# Patient Record
Sex: FEMALE | Race: WHITE | NOT HISPANIC OR LATINO | Employment: OTHER | URBAN - METROPOLITAN AREA
[De-identification: names, ages, dates, MRNs, and addresses within clinical notes are randomized per-mention and may not be internally consistent; named-entity substitution may affect disease eponyms.]

---

## 2017-03-15 DIAGNOSIS — F34.1 DYSTHYMIC DISORDER: ICD-10-CM

## 2017-03-15 NOTE — TELEPHONE ENCOUNTER
Citalopram  Last Written Prescription Date:  12/6/16  Last Fill Quantity: 90,   # refills: 0  Last Office Visit : 3/23/16  Future Office visit:  3/23/17    Route due to warning

## 2017-03-16 RX ORDER — CITALOPRAM HYDROBROMIDE 40 MG/1
40 TABLET ORAL DAILY
Qty: 30 TABLET | Refills: 0 | Status: SHIPPED | OUTPATIENT
Start: 2017-03-16 | End: 2017-03-24

## 2017-03-16 RX ORDER — CITALOPRAM HYDROBROMIDE 40 MG/1
40 TABLET ORAL DAILY
Qty: 30 TABLET | Refills: 0 | Status: SHIPPED
Start: 2017-03-16 | End: 2017-03-16

## 2017-03-16 NOTE — TELEPHONE ENCOUNTER
She is not my patient.  Former patient of Dr. Mauricio.  Patient has not established care with a new provider.  I am not accepting new patients.  Okay for 30 tablets, no refills.  Must have an appointment with a new provider within one month for assessment.  Please call her with a list of providers accepting new patients.  Thank you. Dr. Gant

## 2017-03-24 ENCOUNTER — OFFICE VISIT (OUTPATIENT)
Dept: INTERNAL MEDICINE | Facility: CLINIC | Age: 72
End: 2017-03-24

## 2017-03-24 VITALS
WEIGHT: 193.4 LBS | TEMPERATURE: 98.1 F | SYSTOLIC BLOOD PRESSURE: 135 MMHG | HEART RATE: 68 BPM | OXYGEN SATURATION: 97 % | BODY MASS INDEX: 30.29 KG/M2 | RESPIRATION RATE: 16 BRPM | DIASTOLIC BLOOD PRESSURE: 76 MMHG

## 2017-03-24 DIAGNOSIS — G47.00 INSOMNIA, UNSPECIFIED TYPE: Primary | ICD-10-CM

## 2017-03-24 DIAGNOSIS — G47.33 OSA (OBSTRUCTIVE SLEEP APNEA): ICD-10-CM

## 2017-03-24 DIAGNOSIS — B35.1 ONYCHOMYCOSIS: ICD-10-CM

## 2017-03-24 DIAGNOSIS — Z12.31 ENCOUNTER FOR SCREENING MAMMOGRAM FOR BREAST CANCER: ICD-10-CM

## 2017-03-24 DIAGNOSIS — F34.1 DYSTHYMIC DISORDER: ICD-10-CM

## 2017-03-24 RX ORDER — CICLOPIROX 80 MG/ML
SOLUTION TOPICAL DAILY
Qty: 6.6 ML | Refills: 2 | Status: SHIPPED | OUTPATIENT
Start: 2017-03-24 | End: 2017-04-23

## 2017-03-24 RX ORDER — CITALOPRAM HYDROBROMIDE 40 MG/1
40 TABLET ORAL DAILY
Qty: 90 TABLET | Refills: 3 | Status: SHIPPED | OUTPATIENT
Start: 2017-03-24 | End: 2018-05-06

## 2017-03-24 ASSESSMENT — PAIN SCALES - GENERAL: PAINLEVEL: NO PAIN (0)

## 2017-03-24 NOTE — PATIENT INSTRUCTIONS
[facility] Medication Refill Request Information:  * Please contact your pharmacy regarding ANY request for medication refills.  ** [facility] Prescription Fax = [phone]  * Please allow 3 business days for routine medication refills.  * Please allow 5 business days for controlled substance medication refills.     [facility] Test Result notification information:  *You will be notified with in 7-10 days of your appointment day regarding the results of your test.  If you are on MyChart you will be notified as soon as the provider has reviewed the results and signed off on them.    [facility] [phone]

## 2017-03-24 NOTE — NURSING NOTE
Chief Complaint   Patient presents with     Refill Request     Here for medication refills     Sleep Problem     Concerned about sleeping problems     Toenail     Also here for void toenails on both feet     Juan Beard CMA at 9:00 AM on 3/24/2017

## 2017-03-24 NOTE — MR AVS SNAPSHOT
After Visit Summary   3/24/2017    Toshia Caruso    MRN: [id number]           Patient Information     Date Of Birth          1945        Visit Information        Provider Department      3/24/2017 9:00 AM Mely Reeves MD [facility]        Today's Diagnoses     Insomnia, unspecified type    -  1    Dysthymic disorder        Encounter for screening mammogram for breast cancer        Onychomycosis        HANNA (obstructive sleep apnea)          Care Instructions    [facility] Center Medication Refill Request Information:  * Please contact your pharmacy regarding ANY request for medication refills.  ** [facility] Prescription Fax = [phone]  * Please allow 3 business days for routine medication refills.  * Please allow 5 business days for controlled substance medication refills.     [facility] Test Result notification information:  *You will be notified with in 7-10 days of your appointment day regarding the results of your test.  If you are on MyChart you will be notified as soon as the provider has reviewed the results and signed off on them.    Primary Care Center [phone]           Follow-ups after your visit        Additional Services     SLEEP EVALUATION & MANAGEMENT REFERRAL - ADULT       Please be aware that coverage of these services is subject to the terms and limitations of your health insurance plan.  Call member services at your health plan with any benefit or coverage questions.      Please bring the following to your appointment:    >>   List of current medications   >>   This referral request   >>   Any documents/labs given to you for this referral    [facility]   [phone] (Age 15 and up)                  Follow-up notes from your care team     Return in about 1 year (around 3/24/2018) for Routine Visit.      Your next 10 appointments already scheduled     Mar 28, 2017  9:15 AM CDT   (Arrive by 9:00 AM)   MA SCREENING  DIGITAL BILATERAL with [id number]   [facility] ([facility])    [address]  [address]nd Floor  [facility] [address] [phone]           Do not use any powder, lotion or deodorant under your arms or on your breast. If you do, we will ask you to remove it before your exam.  Wear comfortable, two-piece clothing.  If you have any allergies, tell your care team.  Bring any previous mammograms from other facilities or have them mailed to the breast center. This mammogram location, [facility], now offers 3D mammography. It doesn't replace a screening mammogram and can be done with a regular screening mammogram. It is optional and not all insurances will pay for it. 3D mammography is a special kind of mammogram that produces a three-dimensional image of the breast by using low dose-xrays. 3D allows the radiologist to see the breast tissue differently from 2D, which reduces the chance of repeat testing due to overlapping breast tissue. If you are interested in have a 3D mammogram, please check with your insurance before you arrive for your exam. 3D mammography is offered to all patients. On the day of your exam you will be asked to sign a form stating yes, you wish to have 3D imaging or, no, you decline.            May 18, 2017  9:30 AM CDT   New Sleep Patient with Rios Aquino MD   [facility], [place], Sleep Study ([facility])    [address] [place] [id number] [phone]              Who to contact     Please call your clinic at [phone] to:    Ask questions about your health    Make or cancel appointments    Discuss your medicines    Learn about your test results    Speak to your doctor   If you have compliments or concerns about an experience at your clinic, or if you wish to file a complaint, please contact [facility] Patient Relations at [phone] or  email us at [email or web address]         Additional Information About Your Visit        [email or web address] Information     Admify gives you secure access to your electronic health record. If you see a primary care provider, you can also send messages to your care team and make appointments. If you have questions, please call your primary care clinic.  If you do not have a primary care provider, please call [phone] and they will assist you.      Admify is an electronic gateway that provides easy, online access to your medical records. With Admify, you can request a clinic appointment, read your test results, renew a prescription or communicate with your care team.     To access your existing account, please contact your [facility] or call [phone] for assistance.        Care EveryWhere ID     This is your Care EveryWhere ID. This could be used by other organizations to access your [place] medical records  [id number]        Your Vitals Were     Pulse Temperature Respirations Pulse Oximetry Breastfeeding? BMI (Body Mass Index)    68 98.1  F (36.7  C) (Oral) 16 97% No 30.29 kg/m2       Blood Pressure from Last 3 Encounters:   03/24/17 135/76   04/28/16 140/76   03/23/16 144/84    Weight from Last 3 Encounters:   03/24/17 87.7 kg (193 lb 6.4 oz)   04/28/16 90.7 kg (200 lb)   03/23/16 93.8 kg (206 lb 12.8 oz)                 Today's Medication Changes          These changes are accurate as of: 3/24/17 11:59 PM.  If you have any questions, ask your nurse or doctor.               Start taking these medicines.        Dose/Directions    ciclopirox 8 % Soln   Used for:  Onychomycosis   Started by:  Mely Reeves MD        Externally apply topically daily   Quantity:  6.6 mL   Refills:  2            Where to get your medicines      These medications were sent to [facility] [address] [place], [place] - [address]  [address] [facility] [address]      Phone:  [phone]     ciclopirox 8 % Soln    citalopram 40 MG tablet                Primary Care Provider    None Specified       No primary provider on file.        Thank you!     Thank you for choosing [facility]  for your care. Our goal is always to provide you with excellent care. Hearing back from our patients is one way we can continue to improve our services. Please take a few minutes to complete the written survey that you may receive in the mail after your visit with us. Thank you!             Your Updated Medication List - Protect others around you: Learn how to safely use, store and throw away your medicines at www.disposemymeds.org.          This list is accurate as of: 3/24/17 11:59 PM.  Always use your most recent med list.                   Brand Name Dispense Instructions for use    aspirin 81 MG tablet      Take 1 tablet by mouth daily.       CALCIUM 1200+D3 600- MG-MG-UNIT Tb24   Generic drug:  Calcium-Magnesium-Vitamin D      daily       ciclopirox 8 % Soln     6.6 mL    Externally apply topically daily       citalopram 40 MG tablet    celeXA    90 tablet    Take 1 tablet (40 mg) by mouth daily       desonide 0.05 % ointment    DESOWEN    15 g    Apply twice a day to the rash on the eyelid for 1-2 weeks when rash is active.       ESTROVEN Tabs      Take 1 tablet by mouth daily.       folic acid 400 MCG tablet    FOLVITE     daily       GLUCOSAMINE CHONDROITIN COMPLX Tabs      Take  by mouth. Occassionally       ketoconazole 2 % cream    NIZORAL     Apply topically daily       MULTIvitamin  S Caps      Take 1 tablet by mouth daily Vipul red       OMEGA 3 PO          SOLUBLE FIBER/PROBIOTICS PO      allign       SUPER B COMPLEX/VITAMIN C PO          tacrolimus 0.1 % ointment    PROTOPIC    30 g    Apply to rash areas of the eyelids twice a day until rash resolves.       VITAMIN D (CHOLECALCIFEROL) PO      Take 1 tablet by mouth daily.

## 2017-03-24 NOTE — PROGRESS NOTES
"SUBJECTIVE:    Ms. Caruso is a 71 year old female with pmh of dysthymic disorder, HTN, and HANNA. She presents for medication refill and due to difficulty sleeping. She also wants to discuss an issue she has with her large toenail. She has been having issues sleeping for the last 6 months as per her and her . He believes she is sleeping more during that day that previously. She states she is waking up every 2 hours every other night. She does not wake up for a reason and is not getting out of bed most of these times. She is not falling asleep while driving or at inconvenient times - she say her \"eyes feel heavy\" so she goes to take a nap a couple times a week. She does use CPAP but hasn't seen sleep medicine for a couple years. She denies headaches and does not believe she is waking up from snoring or gasping for air.     She is also concerned about some discoloration of her right big toe nail, she states it is yellowing and has a \"void\" underneath the nail. She noticed after she found some \"fuzz\" under the nail. She has not noticed any infections surrounding the nail, it has not bled, and it only bothers her at night with the pressure of blankets on it. She states it is now involving both big toes.     She is asking for refill of her citalopram.     Past Medical History:   Diagnosis Date     Dysthymic disorder 8/4/2011     Obstructive sleep apnea 9/18/2012     Unspecified essential hypertension 9/13/2011     Past Surgical History:   Procedure Laterality Date     COLONOSCOPY N/A 4/28/2016    Procedure: COLONOSCOPY;  Surgeon: Victoria Mathis MD;  Location:  GI     LAMINECTOMY CERIVCAL POSTERIOR THREE+ LEVELS  2002    C3-7     Family History   Problem Relation Age of Onset     DIABETES Mother      DIABETES Father      DIABETES Maternal Grandfather      C.A.D. Father      silent heart attacks     Thyroid Disease Mother 60     treated with radioactive iodine     Thyroid Disease Sister 30     Glaucoma " "Father      Colon Cancer Father 70     treated with surgery, 6\" colon removed     Social History   Substance Use Topics     Smoking status: Never Smoker     Smokeless tobacco: Never Used     Alcohol use Yes      Comment: rare       Review Of Systems  Skin: negative  Eyes: negative  Ears/Nose/Throat: negative  Respiratory: No shortness of breath, dyspnea on exertion, cough, or hemoptysis  Cardiovascular: negative  Gastrointestinal: negative  Genitourinary: negative  Musculoskeletal: negative  Neurologic: negative  Psychiatric: positive for sleep disturbance  Hematologic/Lymphatic/Immunologic: negative  Endocrine: negative    OBJECTIVE:  /76 (BP Location: Right arm, Patient Position: Chair, Cuff Size: Adult Regular)  Pulse 68  Temp 98.1  F (36.7  C) (Oral)  Resp 16  Wt 87.7 kg (193 lb 6.4 oz)  SpO2 97%  Breastfeeding? No  BMI 30.29 kg/m2  GENERAL APPEARANCE: Alert, no acute distress  HENT: EOMI, pupils equal and reactive, neck supple, TM clear, MMM  NECK: No adenopathy,masses or thyromegaly  RESP: lungs clear to auscultation   CV: normal rate, regular rhythm, no murmur or gallop  ABDOMEN: soft, no organomegaly, masses or tenderness  MS: denilson's and heberden nodes present on hands bilaterally, no joint effusions appreciated, no peripheral edema  SKIN: slight yellowing of right big toenail with lifting of nail off of nail bed, left toenail does not seem discolored but does have slight lifting off of nail bed.  NEURO: Alert, oriented, speech and mentation normal  PSYCHE: mentation appears normal, affect and mood normal    ASSESSMENT/PLAN:    1- Sleep disturbance: Hx of CPAP without recent check, but denies any worsening HANNA symptoms. 6 on the [place] sleepiness scale.    - Recommend follow-up/referral back to sleep medicine  -  Discussed sleep hygiene    2- Medication Refill: 3 on the PHQ-9. Pt maintaining well on current citalopram dosing. Could discuss lowering dose in the future.   - Refill citalopram " 40mg/day    3- Onycomycosis: mild nail changes with discoloration. Not severe enough for oral medication.   - recommend using Penlac topical for 1 month.    4- Health Maintenance: Pt due for mammogram    Total time spent 25 minutes.  More than 50% of the time spent with Ms. Caruso on counseling / coordinating her care          I, Ida Quesada am acting as scribe for Dr. Mely Reeves MD.    Provider Disclosure:    The above student acted as a scribe for this encounter.  I was present and performed the service.  I have reviewed the review of systems and past medical, family and social history.  I have reviewed the student's documentation of the exam and I have performed the exam.   I have reviewed and verified the medical student's documentation and it is correct except as follows: None  Mely Reeves M.D.  Internal Medicine   pager [phone]

## 2017-03-25 ASSESSMENT — PATIENT HEALTH QUESTIONNAIRE - PHQ9: SUM OF ALL RESPONSES TO PHQ QUESTIONS 1-9: 3

## 2017-03-28 ENCOUNTER — RADIANT APPOINTMENT (OUTPATIENT)
Dept: MAMMOGRAPHY | Facility: CLINIC | Age: 72
End: 2017-03-28

## 2017-03-28 DIAGNOSIS — Z12.31 ENCOUNTER FOR SCREENING MAMMOGRAM FOR BREAST CANCER: ICD-10-CM

## 2017-05-18 ENCOUNTER — OFFICE VISIT (OUTPATIENT)
Dept: SLEEP MEDICINE | Facility: CLINIC | Age: 72
End: 2017-05-18
Attending: INTERNAL MEDICINE
Payer: MEDICARE

## 2017-05-18 VITALS
HEIGHT: 67 IN | WEIGHT: 193 LBS | OXYGEN SATURATION: 95 % | DIASTOLIC BLOOD PRESSURE: 71 MMHG | BODY MASS INDEX: 30.29 KG/M2 | HEART RATE: 84 BPM | RESPIRATION RATE: 18 BRPM | SYSTOLIC BLOOD PRESSURE: 113 MMHG

## 2017-05-18 DIAGNOSIS — G47.33 OSA (OBSTRUCTIVE SLEEP APNEA): ICD-10-CM

## 2017-05-18 PROCEDURE — 99211 OFF/OP EST MAY X REQ PHY/QHP: CPT | Mod: ZF

## 2017-05-18 NOTE — PATIENT INSTRUCTIONS
SLEEP HYGIENE TIPS:    1. Don t go to bed unless you are sleepy.   If you are not sleepy at bedtime, then do something else. Read a book, listen to soft music or browse through a magazine. Find something relaxing, but not stimulating, to take your mind off of worries about sleep. This will relax your body and distract your mind.     2. If you are not asleep after 20 minutes, then get out of the bed.   Find something else to do that will make you feel relaxed. If you can, do this in another room. Your bedroom should be where you go to sleep. It is not a place to go when you are bored. Once you feel sleepy again, go back to bed.     3. Begin rituals that help you relax each night before bed.   This can include such things as a warm bath, light snack or a few minutes of reading.     4. Get up at the same time every morning.   Do this even on weekends and holidays.     5. Get a full night s sleep on a regular basis.   Get enough sleep so that you feel well-rested nearly every day.     6. Avoid taking naps if you can.   If you must take a nap, try to keep it short (less than one hour). Never take a nap after 3 p.m.     7. Keep a regular schedule.   Regular times for meals, medications, chores, and other activities help keep the inner body clock running smoothly.     8. Don t read, write, eat, watch TV, talk on the phone, or play cards in bed.     9. Do not have any caffeine after lunch.     10. Do not have a beer, a glass of wine, or any other alcohol within six hours of your bedtime.     11. Do not have a cigarette or any other source of nicotine before bedtime.     12. Do not go to bed hungry, but don t eat a big meal near bedtime either.     13. Avoid any tough exercise within six hours of your bedtime.   You should exercise on a regular basis, but do it earlier in the day. (Talk to your doctor before you begin an exercise program.)     14. Avoid sleeping pills, or use them cautiously.   Most doctors do not prescribe  sleeping pills for periods of more than three weeks. Do not drink alcohol while taking sleeping pills.     15. Try to get rid of or deal with things that make you worry.   If you are unable to do this, then find a time during the day to get all of your worries out of your system. Your bed is a place to rest, not a place to worry.     16. Make your bedroom quiet, dark, and a little bit cool.   An easy way to remember this: it should remind you of a cave. While this may not sound romantic, it seems to work for bats. Bats are champion sleepers. They get about 16 hours of sleep each day. Maybe it s because they sleep in dark, cool caves.     Insomnia - Relaxation  Stress, tension, and anxiety can be big factors contributing to insomnia.  If you can t relax your mind and body, then you won t be able to sleep.  You would likely benefit from trying some of the relaxation exercises that we ve assembled below.  These are all internet based links.  If you don t have or use a computer, you may benefit from one of the stress management books listed below that you can get at your public library or at a local bookstore for a relatively low price.      Progressive Muscle Relaxation (PMR):     http://www.AvidRetail/progressive-muscle-relaxation-exercise.html     http://studentsupport.[facility]/counseling/resources/self-help/relaxation-and-stress-management/   Deep Breathing Exercises:    http://www.AvidRetail/breathing-awareness.html     Meditation:     www.RPost    www.theArroyo Video Solutionsguided-meditation-site.com You may have to pay for some of these resources.    Guided Imagery:    http://www.AvidRetail/guided-imagery-scripts.html     http://Oyster/library/sqzfprlqmd-wpvgiy-gyfbudv/      Your BMI is Body mass index is 30.23 kg/(m^2).  Weight management is a personal decision.  If you are interested in exploring weight loss strategies, the following discussion covers the  approaches that may be successful. Body mass index (BMI) is one way to tell whether you are at a healthy weight, overweight, or obese. It measures your weight in relation to your height.  A BMI of 18.5 to 24.9 is in the healthy range. A person with a BMI of 25 to 29.9 is considered overweight, and someone with a BMI of 30 or greater is considered obese. More than two-thirds of American adults are considered overweight or obese.  Being overweight or obese increases the risk for further weight gain. Excess weight may lead to heart disease and diabetes.  Creating and following plans for healthy eating and physical activity may help you improve your health.  Weight control is part of healthy lifestyle and includes exercise, emotional health, and healthy eating habits. Careful eating habits lifelong are the mainstay of weight control. Though there are significant health benefits from weight loss, long-term weight loss with diet alone may be very difficult to achieve- studies show long-term success with dietary management in less than 10% of people. Attaining a healthy weight may be especially difficult to achieve in those with severe obesity. In some cases, medications, devices and surgical management might be considered.  What can you do?  If you are overweight or obese and are interested in methods for weight loss, you should discuss this with your provider.     Consider reducing daily calorie intake by 500 calories.     Keep a food journal.     Avoiding skipping meals, consider cutting portions instead.    Diet combined with exercise helps maintain muscle while optimizing fat loss. Strength training is particularly important for building and maintaining muscle mass. Exercise helps reduce stress, increase energy, and improves fitness. Increasing exercise without diet control, however, may not burn enough calories to loose weight.       Start walking three days a week 10-20 minutes at a time    Work towards walking  thirty minutes five days a week     Eventually, increase the speed of your walking for 1-2 minutes at time    In addition, we recommend that you review healthy lifestyles and methods for weight loss available through the National Institutes of Health patient information sites:  http://win.niddk.nih.gov/publications/index.htm    And look into health and wellness programs that may be available through your health insurance provider, employer, local community center, or danette club.    Weight management plan: Patient was referred to their PCP to discuss a diet and exercise plan.

## 2017-05-18 NOTE — MR AVS SNAPSHOT
After Visit Summary   5/18/2017    Toshia Caruso    MRN: [id number]           Patient Information     Date Of Birth          1945        Visit Information        Provider Department      5/18/2017 9:30 AM Rios Aquino MD [facility], [place], Sleep Study        Today's Diagnoses     HANNA (obstructive sleep apnea)          Care Instructions    SLEEP HYGIENE TIPS:    1. Don t go to bed unless you are sleepy.   If you are not sleepy at bedtime, then do something else. Read a book, listen to soft music or browse through a magazine. Find something relaxing, but not stimulating, to take your mind off of worries about sleep. This will relax your body and distract your mind.     2. If you are not asleep after 20 minutes, then get out of the bed.   Find something else to do that will make you feel relaxed. If you can, do this in another room. Your bedroom should be where you go to sleep. It is not a place to go when you are bored. Once you feel sleepy again, go back to bed.     3. Begin rituals that help you relax each night before bed.   This can include such things as a warm bath, light snack or a few minutes of reading.     4. Get up at the same time every morning.   Do this even on weekends and holidays.     5. Get a full night s sleep on a regular basis.   Get enough sleep so that you feel well-rested nearly every day.     6. Avoid taking naps if you can.   If you must take a nap, try to keep it short (less than one hour). Never take a nap after 3 p.m.     7. Keep a regular schedule.   Regular times for meals, medications, chores, and other activities help keep the inner body clock running smoothly.     8. Don t read, write, eat, watch TV, talk on the phone, or play cards in bed.     9. Do not have any caffeine after lunch.     10. Do not have a beer, a glass of wine, or any other alcohol within six hours of your bedtime.     11. Do not have a cigarette or any other source of nicotine before bedtime.      12. Do not go to bed hungry, but don t eat a big meal near bedtime either.     13. Avoid any tough exercise within six hours of your bedtime.   You should exercise on a regular basis, but do it earlier in the day. (Talk to your doctor before you begin an exercise program.)     14. Avoid sleeping pills, or use them cautiously.   Most doctors do not prescribe sleeping pills for periods of more than three weeks. Do not drink alcohol while taking sleeping pills.     15. Try to get rid of or deal with things that make you worry.   If you are unable to do this, then find a time during the day to get all of your worries out of your system. Your bed is a place to rest, not a place to worry.     16. Make your bedroom quiet, dark, and a little bit cool.   An easy way to remember this: it should remind you of a cave. While this may not sound romantic, it seems to work for bats. Bats are champion sleepers. They get about 16 hours of sleep each day. Maybe it s because they sleep in dark, cool caves.     Insomnia - Relaxation  Stress, tension, and anxiety can be big factors contributing to insomnia.  If you can t relax your mind and body, then you won t be able to sleep.  You would likely benefit from trying some of the relaxation exercises that we ve assembled below.  These are all internet based links.  If you don t have or use a computer, you may benefit from one of the stress management books listed below that you can get at your public library or at a local bookstore for a relatively low price.      Progressive Muscle Relaxation (PMR):     http://www.[employer]/progressive-muscle-relaxation-exercise.html     http://studentsupport.[facility]/counseling/resources/self-help/relaxation-and-stress-management/   Deep Breathing Exercises:    http://www.[employer]/breathing-awareness.html     Meditation:     www.[employer]    www.HighRoadsguided-meditation-site.[employer] You may have to pay for some of  these resources.    Guided Imagery:    http://www.[employer]/guided-imagery-scripts.html     http://Cur[employer]ider/library/tevmuwkrpb-xkhbxl-nfcdnfc/      Your BMI is Body mass index is 30.23 kg/(m^2).  Weight management is a personal decision.  If you are interested in exploring weight loss strategies, the following discussion covers the approaches that may be successful. Body mass index (BMI) is one way to tell whether you are at a healthy weight, overweight, or obese. It measures your weight in relation to your height.  A BMI of 18.5 to 24.9 is in the healthy range. A person with a BMI of 25 to 29.9 is considered overweight, and someone with a BMI of 30 or greater is considered obese. More than two-thirds of American adults are considered overweight or obese.  Being overweight or obese increases the risk for further weight gain. Excess weight may lead to heart disease and diabetes.  Creating and following plans for healthy eating and physical activity may help you improve your health.  Weight control is part of healthy lifestyle and includes exercise, emotional health, and healthy eating habits. Careful eating habits lifelong are the mainstay of weight control. Though there are significant health benefits from weight loss, long-term weight loss with diet alone may be very difficult to achieve- studies show long-term success with dietary management in less than 10% of people. Attaining a healthy weight may be especially difficult to achieve in those with severe obesity. In some cases, medications, devices and surgical management might be considered.  What can you do?  If you are overweight or obese and are interested in methods for weight loss, you should discuss this with your provider.     Consider reducing daily calorie intake by 500 calories.     Keep a food journal.     Avoiding skipping meals, consider cutting portions instead.    Diet combined with exercise helps maintain muscle while  optimizing fat loss. Strength training is particularly important for building and maintaining muscle mass. Exercise helps reduce stress, increase energy, and improves fitness. Increasing exercise without diet control, however, may not burn enough calories to loose weight.       Start walking three days a week 10-20 minutes at a time    Work towards walking thirty minutes five days a week     Eventually, increase the speed of your walking for 1-2 minutes at time    In addition, we recommend that you review healthy lifestyles and methods for weight loss available through the National Institutes of Health patient information sites:  http://win.niddk.nih.gov/publications/index.htm    And look into health and wellness programs that may be available through your health insurance provider, employer, local community center, or danette club.    Weight management plan: Patient was referred to their PCP to discuss a diet and exercise plan.            Follow-ups after your visit        Follow-up notes from your care team     Return in about 2 months (around 7/18/2017).      Who to contact     If you have questions or need follow up information about today's clinic visit or your schedule please contact [facility] directly at [phone].  Normal or non-critical lab and imaging results will be communicated to you by [employer], letter or phone within 4 business days after the clinic has received the results. If you do not hear from us within 7 days, please contact the clinic through [employer]UM[employer] or phone. If you have a critical or abnormal lab result, we will notify you by phone as soon as possible.  Submit refill requests through [employer] or call your pharmacy and they will forward the refill request to us. Please allow 3 business days for your refill to be completed.          Additional Information About Your Visit        [employer] Information     [employer] gives you secure access to your electronic health record. If you see  "a primary care provider, you can also send messages to your care team and make appointments. If you have questions, please call your primary care clinic.  If you do not have a primary care provider, please call [phone] and they will assist you.        Care EveryWhere ID     This is your Care EveryWhere ID. This could be used by other organizations to access your [facility] medical records  [id number]        Your Vitals Were     Pulse Respirations Height Pulse Oximetry BMI (Body Mass Index)       84 18 1.702 m (5' 7\") 95% 30.23 kg/m2        Blood Pressure from Last 3 Encounters:   05/18/17 113/71   03/24/17 135/76   04/28/16 140/76    Weight from Last 3 Encounters:   05/18/17 87.5 kg (193 lb)   03/24/17 87.7 kg (193 lb 6.4 oz)   04/28/16 90.7 kg (200 lb)              We Performed the Following     SLEEP EVALUATION & MANAGEMENT REFERRAL - ADULT        Primary Care Provider Office Phone # Fax #    Mely Reeves [phone]        [facility] [address] [facility] [address]  [facility] [address]        Thank you!     Thank you for choosing [facility] SLEEP [facility]  for your care. Our goal is always to provide you with excellent care. Hearing back from our patients is one way we can continue to improve our services. Please take a few minutes to complete the written survey that you may receive in the mail after your visit with us. Thank you!             Your Updated Medication List - Protect others around you: Learn how to safely use, store and throw away your medicines at www.disposemymeds.org.          This list is accurate as of: 5/18/17 10:01 AM.  Always use your most recent med list.                   Brand Name Dispense Instructions for use    aspirin 81 MG tablet      Take 1 tablet by mouth daily.       CALCIUM 1200+D3 600- MG-MG-UNIT Tb24   Generic drug:  Calcium-Magnesium-Vitamin D      daily       citalopram 40 MG tablet    celeXA    90 tablet    Take 1 tablet (40 mg) by mouth daily "       desonide 0.05 % ointment    DESOWEN    15 g    Apply twice a day to the rash on the eyelid for 1-2 weeks when rash is active.       ESTROVEN Tabs      Take 1 tablet by mouth daily.       folic acid 400 MCG tablet    FOLVITE     daily       GLUCOSAMINE CHONDROITIN COMPLX Tabs      Take  by mouth. Occassionally       MULTIvitamin  S Caps      Take 1 tablet by mouth daily Vipul red       OMEGA 3 PO          SOLUBLE FIBER/PROBIOTICS PO      allign       SUPER B COMPLEX/VITAMIN C PO          tacrolimus 0.1 % ointment    PROTOPIC    30 g    Apply to rash areas of the eyelids twice a day until rash resolves.       VITAMIN D (CHOLECALCIFEROL) PO      Take 1 tablet by mouth daily.

## 2017-05-19 NOTE — PROGRESS NOTES
SLEEP MEDICINE CLINIC NOTE   [facility] SLEEP DISORDER CENTER  Toshia Caruso 71 year old female  : 1945  MRN: [id number]      PRIMARY CARE PROVIDER: Mely Reeves    REFERRING PROVIDER: Mely Reeves MD   [facility]ICIANS  [address] [facility] [address]  [place] [address]    DATE OF SERVICE:  2017.      REASON FOR VISIT:  Sleep maintenance difficulties, nonrestorative sleep and excessive daytime sleepiness.      HISTORY OF PRESENT ILLNESS:  Toshia Caruso is a 71-year-old female with a history of essential hypertension, dysthymia, obstructive sleep apnea who is being evaluated for difficulty maintaining sleep, nonrestorative sleep, excessive daytime sleepiness.  The patient reports that she has been experiencing these symptoms for the last 1 year.  She is unable to identify a trigger, but does report some stress in the family over this period.  She also reports being diagnosed with obstructive sleep apnea following a PSG approximately 8 years ago.  She has been using a CPAP which is set at 7 cm of water.      She describes her current routine as going to bed between 9:30 p.m. and 11:00 p.m.  She usually likes to read in bed for a number of hours on her electronic device.  Prior to getting into bed, she is usually watching TV in her living room.  Once she turns the lights off, it takes her just a few minutes to fall asleep.  She does use CPAP at this time.  She reports waking up every 2-hours.  Most of these awakenings are spontaneous and these can last anywhere from 10 minutes to 1 to 1-1/2 hours.  She usually lies there are in bed and tries to fall asleep only occasionally leaving the bed to use the restroom.  She reports finally waking up between 6:30 and 7:30 a.m. spontaneously.  She feels nonrestored upon awakening, but denies any significant sleep inertia.  She drinks 4 cups of coffee in the morning with breakfast.  She follows the same schedule most days of the week, including the  weekends.  The patient is retired and spends a significant amount of time at home.  She reports taking a nap in either late morning or midafternoon which can last anywhere from 20 minutes to 3 hours.  The naps are restorative.  She does not use her CPAP during naps.  The naps are usually in the living room on a couch.      The patient denies any features of restless legs syndrome; however, she does report occasional hypnic jerks.  She denies any frequent dreams or nightmares.  She does report a history of sleep walking as a child which occurs rarely now.  Her [place] Sleepiness Score is 8/24 with no chances of falling asleep while driving.  She denies any dream enactment behavior or bruxism.      With the use of CPAP, the patient denies waking up with a headache in the morning or snoring.  She does not have snort arousals or witnessed apneas.  However, these symptoms occur when she is not using her CPAP.  She is currently using nasal pillows with the CPAP and reports breathing through her nose.  She does not have any GERD symptoms.  The patient prefers to sleep on her sides, but occasionally rolls over on her back.      She denies any features of hypocretin deficiency including sleep paralysis, cataplexy or hypnagogic or hypnopompic hallucinations.      SOCIAL HISTORY:  The patient is  and lives at home with her .  She denies smoking or alcohol use.  She used to work as a customer service or , has been retired for a number of years.      FAMILY HISTORY:  She reports that her daughter has obstructive sleep apnea and uses a CPAP.  No other significant medical conditions in the family.      PAST SURGICAL HISTORY:  Includes a cervical spine laminectomy over 10 years ago.      PAST MEDICAL HISTORY:   1.  Hypertension.   2.  Dysthymia.     3.  Intentional weight loss of about 35 pounds over the last 2 years.      REVIEW OF SYSTEMS:  A complete 14-point review of systems was  reviewed and no pertinent information to her clinic visit noted.      PHYSICAL EXAMINATION:   GENERAL:  Pleasant female sitting comfortably without any discomfort, speaking in full sentences.   HEENT:  Mallampati class II airway with a large tongue and prominent peripheral ridging, type 1 dental malocclusion noted.  Neck circumference 14 inches.  No obstruction to flow in the nares.  Next, BMI 30.29 kg/m2.  Rest of the exam is unremarkable.      ASSESSMENT AND PLAN:  The patient is a 71-year-old female with hypertension and dysthymia who is being evaluated for sleep maintenance difficulties, excessive daytime sleepiness and nonrestorative sleep.      1.  Sleep disordered breathing:  The patient has overall moderate obstructive sleep apnea which was diagnosed several years ago.  Her PSG interpretation was reviewed and this showed an apnea-hypopnea index of 17 events per hour with an RDI of 52 events per hour.  The patient is currently using CPAP likely set at 7 cm of water.  This machine is about 2 years old and she changes her supplies frequently.  We will get a download of her CPAP usage and adjust the CPAP settings as indicated.   2.  Sleep maintenance insomnia and excessive daytime sleepiness:  This is likely a combination of inadequate sleep hygiene given that the patient takes long naps spends a significant amount of time in bed when she is not sleeping as well as possible psychophysiological insomnia.  We discussed several measures with her to improve her sleep hygiene as well as perform techniques like stimulus control.  I also provided her with information on progressive muscle relaxation and guided imagery.  The patient was advised to follow these for the next several months.  We will see her back in 2 months and if her symptoms do not resolve, we will consider referral for cognitive behavioral therapy for insomnia.      Total time spent during the visit was 60 minutes;more than 50% time was spent in  counseling and coordinating plan of care.    Rios Aquino MD   of Medicine  Pulmonary, Critical Care and Sleep Medicine  [facility]  Pager: [phone]           D: 2017 11:10   T: 2017 23:44   MT:       Name:     FADIA RANGEL   MRN:      [id number]        Account:      [id number]   :      1945           Visit Date:   2017      Document: [id number]

## 2017-07-21 ENCOUNTER — OFFICE VISIT (OUTPATIENT)
Dept: SLEEP MEDICINE | Facility: CLINIC | Age: 72
End: 2017-07-21
Attending: INTERNAL MEDICINE
Payer: MEDICARE

## 2017-07-21 VITALS
SYSTOLIC BLOOD PRESSURE: 107 MMHG | RESPIRATION RATE: 16 BRPM | HEIGHT: 67 IN | BODY MASS INDEX: 29.66 KG/M2 | HEART RATE: 86 BPM | DIASTOLIC BLOOD PRESSURE: 76 MMHG | WEIGHT: 189 LBS

## 2017-07-21 DIAGNOSIS — G47.33 OSA (OBSTRUCTIVE SLEEP APNEA): Primary | ICD-10-CM

## 2017-07-21 DIAGNOSIS — F51.04 PSYCHOPHYSIOLOGICAL INSOMNIA: ICD-10-CM

## 2017-07-21 PROCEDURE — 99211 OFF/OP EST MAY X REQ PHY/QHP: CPT | Mod: ZF

## 2017-07-21 PROCEDURE — 40000809 ZZH STATISTIC NO DOCUMENTATION TO SUPPORT CHARGE

## 2017-07-21 NOTE — PROGRESS NOTES
SLEEP MEDICINE CLINIC NOTE   [facility] SLEEP DISORDER CENTER  Toshia Caruso 71 year old female  : 1945  MRN: [id number]      PRIMARY CARE PROVIDER: Mely Reeves    Reason for visit: Follow-up of insomnia and sleep-disordered breathing X    History of present illness: The patient is a very pleasant 71-year-old female with history of hypertension, dysthymia, obstructive sleep apnea who was initially seen in our clinic approximately 2 months ago for evaluation of difficulty maintaining sleep, nonrestorative sleep and excessive daytime sleepiness. It was thought that her symptoms were due to poor sleep hygiene and possible psychophysiological insomnia. She was advised she will follow stim was control, progressive muscle relaxation as well as improve her sleep hygiene. She reports that she has been practicing that over last 2 months. She has noted significant improvement in her daytime sleepiness and sleep maintenance insomnia over this time.  She describes her current schedule is going to bed between 10 and 11 PM. She likes to read in bed for a few minutes but it does not take long to fall sleep. She denies waking up in the middle of the night. She wakes up between 6:30 AM and 7 AM most days spontaneously. She feels her respiratory upon awakening and does not have excessive sleepiness. She takes 1-2 naps lasting from 15-30 minutes each day but besides that her [place] sleepiness score is 0. She keeps busy during the day doing crafts, gardening and reading.  She is continuing to use her CPAP during sleep. She has not noticed any difficulty related to that.    Assessment and plan: The patient is advised to continue following current sleep-wake schedule and be consistent with her sleep onset and offset time. She is also advised to continue using her CPAP every time she goes to sleep including during any naps. She is also advised to not drive if drowsy. She should change her CPAP supplies every 3-6  months.    She will be seen in clinic on an annual basis or sooner if new questions emerge.    The above note was dictated using voice recognition software and may include typographical errors. Please contact the author for any clarifications.      I spent a total of 25 minutes face to face with Toshia Caruso during today's office visit. Over 50% of this time was spent counseling the patient and/or coordinating care regarding their sleep disorder.     Rios Aquino MD   of Medicine  Pulmonary, Critical Care and Sleep Medicine  [facility]  Pager: [phone]

## 2017-07-21 NOTE — MR AVS SNAPSHOT
"              After Visit Summary   7/21/2017    Toshia Caruso    MRN: [id number]           Patient Information     Date Of Birth          1945        Visit Information        Provider Department      7/21/2017 2:30 PM Bhupinder Melo MD [facility], Sleep Study        Today's Diagnoses     HANNA (obstructive sleep apnea)    -  1    Psychophysiological insomnia           Follow-ups after your visit        Follow-up notes from your care team     Return in about 1 year (around 7/21/2018), or if symptoms worsen or fail to improve.      Who to contact     If you have questions or need follow up information about today's clinic visit or your schedule please contact [facility] directly at [phone].  Normal or non-critical lab and imaging results will be communicated to you by [employer], letter or phone within 4 business days after the clinic has received the results. If you do not hear from us within 7 days, please contact the clinic through [email or web address] or phone. If you have a critical or abnormal lab result, we will notify you by phone as soon as possible.  Submit refill requests through [employer] or call your pharmacy and they will forward the refill request to us. Please allow 3 business days for your refill to be completed.          Additional Information About Your Visit        MyChart Information     [employer] gives you secure access to your electronic health record. If you see a primary care provider, you can also send messages to your care team and make appointments. If you have questions, please call your primary care clinic.  If you do not have a primary care provider, please call [phone] and they will assist you.        Care EveryWhere ID     This is your Care EveryWhere ID. This could be used by other organizations to access your [facility] medical records  [id number]        Your Vitals Were     Pulse Respirations Height BMI (Body Mass Index)          86 16 1.702 m (5' 7\") 29.6 kg/m2   "       Blood Pressure from Last 3 Encounters:   07/21/17 107/76   05/18/17 113/71   03/24/17 135/76    Weight from Last 3 Encounters:   07/21/17 85.7 kg (189 lb)   05/18/17 87.5 kg (193 lb)   03/24/17 87.7 kg (193 lb 6.4 oz)              Today, you had the following     No orders found for display       Primary Care Provider Office Phone # Fax #    Mely Reeves [phone]        [facility] [address] [place] [address] [address] [place] [address]        Equal Access to Services     [facility] : Hadii marcellus choudhary Sosowmya, wayuridia sierra, qayoshi kaalmada gianluca, francisco sims . So [facility] [phone].    ATENCIÓN: Si habla español, tiene a gutierrez disposición servicios gratuitos de asistencia lingüística. [facility] [phone].    We comply with applicable federal civil rights laws and [place] laws. We do not discriminate on the basis of race, color, national origin, age, disability sex, sexual orientation or gender identity.            Thank you!     Thank you for choosing [facility], [facility]EP [facility]UDY  for your care. Our goal is always to provide you with excellent care. Hearing back from our patients is one way we can continue to improve our services. Please take a few minutes to complete the written survey that you may receive in the mail after your visit with us. Thank you!             Your Updated Medication List - Protect others around you: Learn how to safely use, store and throw away your medicines at www.disposemymeds.org.          This list is accurate as of: 7/21/17  3:36 PM.  Always use your most recent med list.                   Brand Name Dispense Instructions for use Diagnosis    aspirin 81 MG tablet      Take 1 tablet by mouth as needed        CALCIUM 1200+D3 600- MG-MG-UNIT Tb24   Generic drug:  Calcium-Magnesium-Vitamin D      daily        citalopram 40 MG tablet    celeXA    90 tablet    Take 1 tablet (40 mg) by mouth daily    Dysthymic disorder        ESTROVEN Tabs      Take 1 tablet by mouth daily.        folic acid 400 MCG tablet    FOLVITE     daily        GLUCOSAMINE CHONDROITIN COMPLX Tabs      Take  by mouth. Occassionally        MULTIvitamin  S Caps      Take 1 tablet by mouth daily Viupl red        OMEGA 3 PO       Routine general medical examination at a health care facility       SOLUBLE FIBER/PROBIOTICS PO      allign    Routine general medical examination at a health care facility       SUPER B COMPLEX/VITAMIN C PO           tacrolimus 0.1 % ointment    PROTOPIC    30 g    Apply to rash areas of the eyelids twice a day until rash resolves.    Contact dermatitis of eyelid, unspecified laterality       VITAMIN D (CHOLECALCIFEROL) PO      Take 1 tablet by mouth daily.

## 2018-03-09 ENCOUNTER — MYC MEDICAL ADVICE (OUTPATIENT)
Dept: INTERNAL MEDICINE | Facility: CLINIC | Age: 73
End: 2018-03-09

## 2018-05-06 DIAGNOSIS — F34.1 DYSTHYMIC DISORDER: ICD-10-CM

## 2018-05-08 ENCOUNTER — TELEPHONE (OUTPATIENT)
Dept: INTERNAL MEDICINE | Facility: CLINIC | Age: 73
End: 2018-05-08

## 2018-05-08 NOTE — TELEPHONE ENCOUNTER
----- Message from Kamila Younger RN sent at 5/8/2018  1:07 PM CDT -----  Pt   Toshia Caruso [[id number]]   [facility],    Please call to schedule.    Patient is overdue for annual clinic visit - unless otherwise indicated patient needs to be scheduled with 1st available.    Patient may need labs and or imaging - please check with RN care coordinator.    Thanks    I do not need any follow up on the scheduling of this appointment unless the patient will no longer be receiving care in our clinic.

## 2018-05-08 NOTE — TELEPHONE ENCOUNTER
citalopram (CELEXA) 40 MG tablet      Last Written Prescription Date:  3/24/17  Last Fill Quantity: 90,   # refills: 3  Last Office Visit : 3/24/17  Future Office visit:  None    Scheduling has been notified to contact the pt for appointment.      Routing  Because: PHQ9, appt

## 2018-05-08 NOTE — TELEPHONE ENCOUNTER
Called patient, spouse answered phone, patient was not currently home. Will try to call her again later.

## 2018-05-10 RX ORDER — CITALOPRAM HYDROBROMIDE 40 MG/1
40 TABLET ORAL DAILY
Qty: 30 TABLET | Refills: 0 | Status: SHIPPED | OUTPATIENT
Start: 2018-05-10 | End: 2018-06-02

## 2018-05-29 ASSESSMENT — ENCOUNTER SYMPTOMS
TROUBLE SWALLOWING: 0
MUSCLE WEAKNESS: 0
INSOMNIA: 1
ARTHRALGIAS: 1
DOUBLE VISION: 0
HEADACHES: 0
NIGHT SWEATS: 0
BOWEL INCONTINENCE: 0
SPEECH CHANGE: 0
NERVOUS/ANXIOUS: 0
BLOATING: 0
POOR WOUND HEALING: 0
SMELL DISTURBANCE: 0
LOSS OF CONSCIOUSNESS: 0
ABDOMINAL PAIN: 0
DIZZINESS: 0
WEAKNESS: 0
SEIZURES: 0
EXERCISE INTOLERANCE: 1
EYE WATERING: 1
HALLUCINATIONS: 0
SINUS CONGESTION: 1
SKIN CHANGES: 0
LIGHT-HEADEDNESS: 0
EYE PAIN: 0
HYPOTENSION: 0
POLYPHAGIA: 0
STIFFNESS: 1
FATIGUE: 1
MYALGIAS: 1
ORTHOPNEA: 0
HOARSE VOICE: 0
NECK PAIN: 0
HYPERTENSION: 0
SYNCOPE: 0
SORE THROAT: 0
FEVER: 0
NAUSEA: 0
DEPRESSION: 0
TASTE DISTURBANCE: 0
EYE IRRITATION: 0
BACK PAIN: 1
NECK MASS: 0
HEARTBURN: 0
CHILLS: 1
DECREASED CONCENTRATION: 0
JOINT SWELLING: 0
NUMBNESS: 0
INCREASED ENERGY: 1
DIARRHEA: 0
WEIGHT LOSS: 0
WEIGHT GAIN: 1
CONSTIPATION: 1
SINUS PAIN: 1
BLOOD IN STOOL: 0
SLEEP DISTURBANCES DUE TO BREATHING: 0
PANIC: 0
RECTAL PAIN: 0
LEG PAIN: 0
MUSCLE CRAMPS: 0
POLYDIPSIA: 0
NAIL CHANGES: 0
MEMORY LOSS: 0
DECREASED APPETITE: 0
TREMORS: 0
DISTURBANCES IN COORDINATION: 0
TINGLING: 1
EYE REDNESS: 0
PARALYSIS: 0
VOMITING: 0
ALTERED TEMPERATURE REGULATION: 1
JAUNDICE: 0

## 2018-05-29 ASSESSMENT — ACTIVITIES OF DAILY LIVING (ADL)
IN_THE_PAST_7_DAYS,_DID_YOU_NEED_HELP_FROM_OTHERS_TO_PERFORM_EVERYDAY_ACTIVITIES_SUCH_AS_EATING,_GETTING_DRESSED,_GROOMING,_BATHING,_WALKING,_OR_USING_THE_TOILET: N
IN_THE_PAST_7_DAYS,_DID_YOU_NEED_HELP_FROM_OTHERS_TO_TAKE_CARE_OF_THINGS_SUCH_AS_LAUNDRY_AND_HOUSEKEEPING,_BANKING,_SHOPPING,_USING_THE_TELEPHONE,_FOOD_PREPARATION,_TRANSPORTATION,_OR_TAKING_YOUR_OWN_MEDICATIONS?: N

## 2018-06-02 ENCOUNTER — RADIANT APPOINTMENT (OUTPATIENT)
Dept: GENERAL RADIOLOGY | Facility: CLINIC | Age: 73
End: 2018-06-02
Attending: INTERNAL MEDICINE
Payer: COMMERCIAL

## 2018-06-02 ENCOUNTER — HEALTH MAINTENANCE LETTER (OUTPATIENT)
Age: 73
End: 2018-06-02

## 2018-06-02 ENCOUNTER — OFFICE VISIT (OUTPATIENT)
Dept: INTERNAL MEDICINE | Facility: CLINIC | Age: 73
End: 2018-06-02
Payer: COMMERCIAL

## 2018-06-02 VITALS
SYSTOLIC BLOOD PRESSURE: 108 MMHG | HEART RATE: 71 BPM | HEIGHT: 67 IN | BODY MASS INDEX: 30.89 KG/M2 | DIASTOLIC BLOOD PRESSURE: 72 MMHG | WEIGHT: 196.8 LBS

## 2018-06-02 DIAGNOSIS — M25.551 HIP PAIN, RIGHT: ICD-10-CM

## 2018-06-02 DIAGNOSIS — Z23 NEED FOR VACCINATION: ICD-10-CM

## 2018-06-02 DIAGNOSIS — Z11.59 NEED FOR HEPATITIS C SCREENING TEST: ICD-10-CM

## 2018-06-02 DIAGNOSIS — I10 ESSENTIAL HYPERTENSION: ICD-10-CM

## 2018-06-02 DIAGNOSIS — G47.9 SLEEP DISTURBANCE: ICD-10-CM

## 2018-06-02 DIAGNOSIS — E78.00 HYPERCHOLESTEREMIA: Primary | ICD-10-CM

## 2018-06-02 DIAGNOSIS — G47.33 OSA (OBSTRUCTIVE SLEEP APNEA): ICD-10-CM

## 2018-06-02 DIAGNOSIS — Z12.31 VISIT FOR SCREENING MAMMOGRAM: ICD-10-CM

## 2018-06-02 DIAGNOSIS — E78.00 HYPERCHOLESTEREMIA: ICD-10-CM

## 2018-06-02 DIAGNOSIS — G47.10 EXCESSIVE SLEEPINESS: ICD-10-CM

## 2018-06-02 DIAGNOSIS — F34.1 DYSTHYMIC DISORDER: ICD-10-CM

## 2018-06-02 DIAGNOSIS — Z00.00 ROUTINE GENERAL MEDICAL EXAMINATION AT A HEALTH CARE FACILITY: Primary | ICD-10-CM

## 2018-06-02 DIAGNOSIS — Z00.00 ROUTINE GENERAL MEDICAL EXAMINATION AT A HEALTH CARE FACILITY: ICD-10-CM

## 2018-06-02 LAB
ALBUMIN SERPL-MCNC: 3.8 G/DL (ref 3.4–5)
ALP SERPL-CCNC: 72 U/L (ref 40–150)
ALT SERPL W P-5'-P-CCNC: 25 U/L (ref 0–50)
ANION GAP SERPL CALCULATED.3IONS-SCNC: 7 MMOL/L (ref 3–14)
AST SERPL W P-5'-P-CCNC: 20 U/L (ref 0–45)
BASOPHILS # BLD AUTO: 0 10E9/L (ref 0–0.2)
BASOPHILS NFR BLD AUTO: 0.9 %
BILIRUB SERPL-MCNC: 0.4 MG/DL (ref 0.2–1.3)
BUN SERPL-MCNC: 17 MG/DL (ref 7–30)
CALCIUM SERPL-MCNC: 8.8 MG/DL (ref 8.5–10.1)
CHLORIDE SERPL-SCNC: 106 MMOL/L (ref 94–109)
CHOLEST SERPL-MCNC: 225 MG/DL
CO2 SERPL-SCNC: 30 MMOL/L (ref 20–32)
CREAT SERPL-MCNC: 0.79 MG/DL (ref 0.52–1.04)
DIFFERENTIAL METHOD BLD: NORMAL
EOSINOPHIL # BLD AUTO: 0.3 10E9/L (ref 0–0.7)
EOSINOPHIL NFR BLD AUTO: 5.8 %
ERYTHROCYTE [DISTWIDTH] IN BLOOD BY AUTOMATED COUNT: 13 % (ref 10–15)
GFR SERPL CREATININE-BSD FRML MDRD: 72 ML/MIN/1.7M2
GLUCOSE SERPL-MCNC: 93 MG/DL (ref 70–99)
HCT VFR BLD AUTO: 43.6 % (ref 35–47)
HDLC SERPL-MCNC: 88 MG/DL
HGB BLD-MCNC: 14.5 G/DL (ref 11.7–15.7)
IMM GRANULOCYTES # BLD: 0 10E9/L (ref 0–0.4)
IMM GRANULOCYTES NFR BLD: 0.4 %
LDLC SERPL CALC-MCNC: 126 MG/DL
LYMPHOCYTES # BLD AUTO: 1.2 10E9/L (ref 0.8–5.3)
LYMPHOCYTES NFR BLD AUTO: 26.8 %
MCH RBC QN AUTO: 32.1 PG (ref 26.5–33)
MCHC RBC AUTO-ENTMCNC: 33.3 G/DL (ref 31.5–36.5)
MCV RBC AUTO: 97 FL (ref 78–100)
MONOCYTES # BLD AUTO: 0.3 10E9/L (ref 0–1.3)
MONOCYTES NFR BLD AUTO: 7.3 %
NEUTROPHILS # BLD AUTO: 2.7 10E9/L (ref 1.6–8.3)
NEUTROPHILS NFR BLD AUTO: 58.8 %
NONHDLC SERPL-MCNC: 138 MG/DL
NRBC # BLD AUTO: 0 10*3/UL
NRBC BLD AUTO-RTO: 0 /100
PLATELET # BLD AUTO: 268 10E9/L (ref 150–450)
POTASSIUM SERPL-SCNC: 4 MMOL/L (ref 3.4–5.3)
PROT SERPL-MCNC: 7 G/DL (ref 6.8–8.8)
RBC # BLD AUTO: 4.52 10E12/L (ref 3.8–5.2)
SODIUM SERPL-SCNC: 142 MMOL/L (ref 133–144)
TRIGL SERPL-MCNC: 58 MG/DL
TSH SERPL DL<=0.005 MIU/L-ACNC: 1.86 MU/L (ref 0.4–4)
WBC # BLD AUTO: 4.5 10E9/L (ref 4–11)

## 2018-06-02 RX ORDER — CETIRIZINE HYDROCHLORIDE 10 MG/1
TABLET ORAL
COMMUNITY
Start: 2000-05-01 | End: 2022-06-05

## 2018-06-02 RX ORDER — CITALOPRAM HYDROBROMIDE 40 MG/1
40 TABLET ORAL DAILY
Qty: 90 TABLET | Refills: 3 | Status: SHIPPED | OUTPATIENT
Start: 2018-06-02 | End: 2019-05-27

## 2018-06-02 ASSESSMENT — PAIN SCALES - GENERAL: PAINLEVEL: MILD PAIN (2)

## 2018-06-02 NOTE — PROGRESS NOTES
[facility]  Primary Care Center   Mely Reeves MD  06/02/2018      Chief Complaint:   Physical, Musculoskeletal Problem       History of Present Illness:   Toshia Caruso is a 72 year old female with a history of HTN and HANNA who presents for an annual physical.    In the past 6 months, she has developed some intermittent pain in her right hip which waxes and wanes in intensity.  She denies any fall or trauma and cannot identify any factors which precipitate her pain.  She has pain along her groin, in her lateral thigh, along the crest of her pelvis, and into her buttocks.  Her  notes she hobbles around at time due to the pain but she feels she can push through the pain and walk.  At its worst, the pain is about a 4/10.  She has difficulty getting up off the floor but does not have any morning stiffness.  She is not particularly active although does garden and walk some.  She has been taking extra strength Tylenol at night as needed.    She can be drowsy during the day, getting so sleepy when sitting that she has to nap.  She never falls asleep during meals or conversations or while driving.  She has known sleep apnea and wears her CPAP consistently.  Her CPAP was checked in the past year although she thinks she is about due for this again.  She sometimes naps in the morning and then again in the afternoon but still able to sleep at night.  Her mood has been stable and she has normal interest in daily activities that she enjoys.  She does not enjoy being in large group settings like she used to.  She denies any suicidal thoughts.  She enjoys eating and feels her appetite has been stable.  She has been gradually gaining weight back after previously losing weight but does not feel this is abnormal for her.  She denies any focal weakness, numbness, speech difficulty, swallowing problems, or unsteadiness.  PHQ-9: 5/27    Typically she is warm and uses fewer layers of clothing and blankets than others.  Recently,  however, she has had episodes where she feels cold and has to wrap up in a blanket.  At the longest this feeling has lasted about 3 days.  She has occasional constipation but nothing new or different.  She notes a family history of overactive thyroid in her mother and sister.    She was bitten by a tick a week ago on the inside of her right ankle.  This was large enough to see and she has not had any spreading rash, new joint pain, or other symptoms in the past week.    She will be traveling to [place] for 3 weeks to spend time with her father and celebrate his 89th birthday.    Other concerns discussed:  1.  Shingrix vaccine - recommended, patient will check with insurance  2.  Mammogram - due  3.  Colonoscopy - every 5 years, due 2021  4.  DXA - normal bone density 2013    The 10-year ASCVD risk score (Fabien DEAL Jr, et al., 2013) is: 7.4%    Values used to calculate the score:      Age: 72 years      Sex: Female      Is Non- : No      Diabetic: No      Tobacco smoker: No      Systolic Blood Pressure: 108 mmHg      Is BP treated: No      HDL Cholesterol: 88 mg/dL      Total Cholesterol: 225 mg/dL       Review of Systems:   Pertinent items are noted in HPI or as in patient entered ROS below, remainder of complete ROS is negative.    Active Medications:      aspirin 81 MG tablet, Take 1 tablet by mouth as needed , Disp: , Rfl:      B Complex-C (SUPER B COMPLEX/VITAMIN C PO), , Disp: , Rfl:      Calcium-Magnesium-Vitamin D (CALCIUM 1200+D3) 600- MG-MG-UNIT TB24, daily, Disp: , Rfl:      cetirizine (KLS ALLER-SHERLYN) 10 MG tablet, , Disp: , Rfl:      citalopram (CELEXA) 40 MG tablet, Take 1 tablet (40 mg) by mouth daily, Disp: 30 tablet, Rfl: 0     folic acid (FOLVITE) 400 MCG tablet, daily, Disp: , Rfl:      Glucosamine-Chondroit-Vit C-Mn (GLUCOSAMINE CHONDROITIN COMPLX) TABS, Take  by mouth. Occasionally, Disp: , Rfl:      Loratadine-Pseudoephedrine (KLS ALLERCLEAR D-24HR PO), , Disp: , Rfl:  "     Multiple Vitamin (MULTIVITAMINS) CAPS, Take 1 tablet by mouth daily Vipul red, Disp: , Rfl:      Nutritional Supplements (ESTROVEN) TABS, Take 1 tablet by mouth daily., Disp: , Rfl:      Omega-3 Fatty Acids (OMEGA 3 PO), , Disp: , Rfl:      Probiotic Product (SOLUBLE FIBER/PROBIOTICS PO), allign, Disp: , Rfl:      VITAMIN D, CHOLECALCIFEROL, PO, Take 1 tablet by mouth daily., Disp: , Rfl:       Allergies:   No known drug allergies.       Past Medical History:  Dysthymic disorder  Obstructive sleep apnea   Essential hypertension     Past Surgical History:  Cervical laminectomy, posterior, C3-7 2002    Family History:   Diabetes - mother, father, maternal grandfather   Coronary artery disease - father   Thyroid disease - mother, sister  Colon cancer - father  Glaucoma - father     Social History:   Marital Status:   Presents to clinic with her   Hobbies: gardening, crafts  Tobacco Use: No previous or current tobacco use.   Alcohol Use: Rare alcohol use      Physical Exam:   /72  Pulse 71  Ht 1.708 m (5' 7.24\")  Wt 89.3 kg (196 lb 12.8 oz)  BMI 30.6 kg/m2     Physical Examination:  General:  Pleasant, alert, no acute distress  Eyes:  Pupils 2-3 mm, sclera white, EOM's full.    Ears:  TM's normal, EAC's patent, clear of cerumen  Nose:  Nasal passages clear, turbinates not swollen, no polyps visualized.  Throat/Mouth:  No pharyngeal erythema or exudate, oral mucosa and tongue moist, no suspicious oral lesions  Neck:  Full AROM, supple, thyroid smooth, symmetric, not enlarge, no nodules  Lungs:  Clear to auscultation throughout, no wheezes, rhonchi or rales.  C/V:  Regular rate and rhythm, no murmurs, rubs or gallops.  No JVD, no carotid bruits.  No peripheral edema.    Abdomen:  Not distended.  Bowel sounds active.  No tenderness, no hepatosplenomegaly or masses.  No CVA tenderness or masses.  Lymph:  No cervical, axillary or inguinal lymph nodes.  Neuro:   Face symmetric. No tremor.  Gait " steady.   M/S:  No joint deformities noted.  No joint swelling.  Right hip: able to flex past 90 degrees, positive Enrique's test, tenderness just distal to the greater trochanter, tenderness over right sciatic notch, no tenderness over SI joint.  Negative straight leg test.  No lumbar spine tenderness.  Skin:  Bite tosin on medial right ankle, pink, about 3 mm across with small area of lighter pink surrounding. No rashes or jaundice.  Normal moisture, good skin turgor.   Psych:  Broad range affect.  Not psychomotor slowed.  No signs of anxiety or agitation.       Assessment and Plan:  Preventative Physical    Need for hepatitis C screening test  - Hepatitis C Screen Reflex to HCV RNA Quant and Genotype    Visit for screening mammogram  - MA SCREENING DIGITAL BILAT - Future  (s+30)    Need for vaccination  Patient will check on insurance coverage and can come in at her convenience if covered.  - HC ZOSTER VACCINE RECOMBINANT ADJUVANTED IM NJX    Excessive sleepiness  Etiology unclear.  She endorses compliance with her CPAP and denies significant depressive symptoms.  Will screen for anemia, thyroid dysfunction as a cause of her symptoms.  Recommended she follow up with the sleep clinic regarding her CPAP to see if this needs to be adjusted.    - CBC with platelets differential  - Comprehensive metabolic panel  - TSH with free T4 reflex    Hip pain, right  Suspect she has arthritis of the hip with some of her pain possibly relating to change in her gait to compensate.  X-ray today and then  She can schedule physical therapy once she returns from [place].  - PHYSICAL THERAPY REFERRAL  - XR Hip Right 2-3 Views    Hypercholesteremia  - Lipid Profile FASTING        Follow-up: Return in about 3 months (around 9/2/2018) for hip pain, fatigue/sleepiness.         Scribe Disclosure:   I, Maria T Alanis, am serving as a scribe to document services personally performed by Mely Reeves MD at this visit, based upon the  provider's statements to me. All documentation has been reviewed by the aforementioned provider prior to being entered into the official medical record.     Portions of this medical record were completed by a scribe. UPON MY REVIEW AND AUTHENTICATION BY ELECTRONIC SIGNATURE, this confirms (a) I performed the applicable clinical services, and (b) the record is accurate.      Answers for HPI/ROS submitted by the patient on 5/29/2018   General Symptoms: Yes  Skin Symptoms: Yes  HENT Symptoms: Yes  EYE SYMPTOMS: Yes  HEART SYMPTOMS: Yes  LUNG SYMPTOMS: No  INTESTINAL SYMPTOMS: Yes  URINARY SYMPTOMS: No  GYNECOLOGIC SYMPTOMS: No  BREAST SYMPTOMS: No  SKELETAL SYMPTOMS: Yes  BLOOD SYMPTOMS: No  NERVOUS SYSTEM SYMPTOMS: Yes  MENTAL HEALTH SYMPTOMS: Yes  Fever: No  Loss of appetite: No  Weight loss: No  Weight gain: Yes  Fatigue: Yes  Night sweats: No  Chills: Yes  Increased stress: No  Excessive hunger: No  Excessive thirst: No  Feeling hot or cold when others believe the temperature is normal: Yes  Loss of height: Yes  Post-operative complications: No  Surgical site pain: No  Hallucinations: No  Change in or Loss of Energy: Yes  Hyperactivity: No  Confusion: No  Changes in hair: No  Changes in moles/birth marks: No  Itching: Yes  Rashes: No  Changes in nails: No  Acne: No  Hair in places you don't want it: No  Change in facial hair: No  Warts: No  Non-healing sores: No  Scarring: No  Flaking of skin: No  Color changes of hands/feet in cold : No  Sun sensitivity: No  Skin thickening: No  Ear pain: No  Ear discharge: No  Hearing loss: No  Tinnitus: No  Nosebleeds: No  Congestion: Yes  Sinus pain: Yes  Trouble swallowing: No   Voice hoarseness: No  Mouth sores: No  Sore throat: No  Tooth pain: No  Gum tenderness: No  Bleeding gums: No  Change in taste: No  Change in sense of smell: No  Dry mouth: No  Hearing aid used: No  Neck lump: No  Eye pain: No  Vision loss: No  Dry eyes: No  Watery eyes: Yes  Eye bulging: No  Double  vision: No  Flashing of lights: No  Spots: No  Floaters: Yes  Redness: No  Crossed eyes: No  Tunnel Vision: No  Yellowing of eyes: No  Eye irritation: No  Chest pain or pressure: No  Pain in legs with walking: No  Trouble breathing while lying down: No  Fingers or toes appear blue: No  High blood pressure: No  Low blood pressure: No  Fainting: No  Murmurs: No  Pacemaker: No  Varicose veins: No  Edema or swelling: No  Wake up at night with shortness of breath: No  Light-headedness: No  Exercise intolerance: Yes  Heart burn or indigestion: No  Nausea: No  Vomiting: No  Abdominal pain: No  Bloating: No  Constipation: Yes  Diarrhea: No  Blood in stool: No  Black stools: No  Rectal or Anal pain: No  Fecal incontinence: No  Yellowing of skin or eyes: No  Vomit with blood: No  Change in stools: No  Back pain: Yes  Muscle aches: Yes  Neck pain: No  Swollen joints: No  Joint pain: Yes  Bone pain: No  Muscle cramps: No  Muscle weakness: No  Joint stiffness: Yes  Bone fracture: No  Trouble with coordination: No  Dizziness or trouble with balance: No  Fainting or black-out spells: No  Memory loss: No  Headache: No  Seizures: No  Speech problems: No  Tingling: Yes  Tremor: No  Weakness: No  Difficulty walking: No  Paralysis: No  Numbness: No  Nervous or Anxious: No  Depression: No  Trouble sleeping: Yes  Trouble thinking or concentrating: No  Mood changes: No  Panic attacks: No

## 2018-06-02 NOTE — MR AVS SNAPSHOT
After Visit Summary   6/2/2018    Toshia Caruso    MRN: [id number]           Patient Information     Date Of Birth          1945        Visit Information        Provider Department      6/2/2018 8:30 AM Mely Reeves MD [facility]        Today's Diagnoses     Need for vaccination    -  1    Need for hepatitis C screening test        Visit for screening mammogram        Excessive sleepiness        Hip pain, right        Hypercholesteremia          Care Instructions    Primary Care Center: [phone]     Primary Care Center Medication Refill Request Information:  * Please contact your pharmacy regarding ANY request for medication refills.  ** [facility] Prescription Fax = [phone]  * Please allow 3 business days for routine medication refills.  * Please allow 5 business days for controlled substance medication refills.     Primary Care Center Test Result notification information:  *You will be notified with in 7-10 days of your appointment day regarding the results of your test.  If you are on MyChart you will be notified as soon as the provider has reviewed the results and signed off on them.      Please follow up with Dr Manuel 2-3 months after physical therapy          Follow-ups after your visit        Additional Services     PHYSICAL THERAPY REFERRAL       *This therapy referral will be filtered to a centralized scheduling office at [facility] and the patient will receive a call to schedule an appointment at a [place] location most convenient for them. *     [facility] provides Physical Therapy evaluation and treatment and many specialty services across the [place] system.  If requesting a specialty program, please choose from the list below.    If you have not heard from the scheduling office within 2 business days, please call [phone] for all locations, with the exception of [place], please call [phone] and [place]  "please call [phone]  Treatment: Evaluation & Treatment  Special Instructions/Modalities: none  Special Programs:none    Please be aware that coverage of these services is subject to the terms and limitations of your health insurance plan.  Call member services at your health plan with any benefit or coverage questions.      **Note to Provider:  If you are referring outside of [place] for the therapy appointment, please list the name of the location in the \"special instructions\" above, print the referral and give to the patient to schedule the appointment.                  Follow-up notes from your care team     Return in about 3 months (around 9/2/2018) for hip pain, fatigue/sleepiness.      Your next 10 appointments already scheduled     Jun 02, 2018  9:30 AM CDT   XR HIP RIGHT G/E 2 VIEWS with UCXR1   [facility] ([facility])    [address] [place] [address] [phone]           Please bring a list of your current medicines to your exam. (Include vitamins, minerals and over-thecounter medicines.) Leave your valuables at home.  Tell your doctor if there is a chance you may be pregnant.  You do not need to do anything special for this exam.            Jun 02, 2018 10:00 AM CDT   LAB with  LAB   [facility] ([facility])    [address] [address] [phone]           Please do not eat 10-12 hours before your appointment if you are coming in fasting for labs on lipids, cholesterol, or glucose (sugar). This does not apply to pregnant women. Water, hot tea and black coffee (with nothing added) are okay. Do not drink other fluids, diet soda or chew gum.            Jun 05, 2018 11:45 AM CDT   MA SCREENING DIGITAL BILATERAL with [id number]   [facility] ([facility])    [address] [address] " "  [phone]           Do not use any powder, lotion or deodorant under your arms or on your breast. If you do, we will ask you to remove it before your exam.  Wear comfortable, two-piece clothing.  If you have any allergies, tell your care team.  Bring any previous mammograms from other facilities or have them mailed to the breast center. Three-dimensional (3D) mammograms are available at [place] locations in [facility], [place], [facility], [facility], and [place]. [facility] locations include [place] and [facility] in [place]. Benefits of 3D mammograms include: - Improved rate of cancer detection - Decreases your chance of having to go back for more tests, which means fewer: - \"False-positive\" results (This means that there is an abnormal area but it isn't cancer.) - Invasive testing procedures, such as a biopsy or surgery - Can provide clearer images of the breast if you have dense breast tissue. 3D mammography is an optional exam that anyone can have with a 2D mammogram. It doesn't replace or take the place of a 2D mammogram. 2D mammograms remain an effective screening test for all women.  Not all insurance companies cover the cost of a 3D mammogram. Check with your insurance.              Future tests that were ordered for you today     Open Future Orders        Priority Expected Expires Ordered    Hepatitis C Screen Reflex to HCV RNA Quant and Genotype Routine 6/2/2018 6/2/2019 6/2/2018    MA SCREENING DIGITAL BILAT - Future  (s+30) Routine  6/2/2019 6/2/2018    CBC with platelets differential Routine 6/2/2018 6/16/2018 6/2/2018    Comprehensive metabolic panel Routine 6/2/2018 6/16/2018 6/2/2018    TSH with free T4 reflex Routine 6/2/2018 6/16/2018 6/2/2018    Lipid Profile FASTING Routine 6/2/2018 6/2/2019 6/2/2018    XR Hip Right 2-3 Views Routine 6/2/2018 6/2/2019 6/2/2018            Who to contact     Please call your clinic at [phone] to:    Ask " "questions about your health    Make or cancel appointments    Discuss your medicines    Learn about your test results    Speak to your doctor            Additional Information About Your Visit        [email or web address] Information     [employer] gives you secure access to your electronic health record. If you see a primary care provider, you can also send messages to your care team and make appointments. If you have questions, please call your primary care clinic.  If you do not have a primary care provider, please call [phone] and they will assist you.      [employer] is an electronic gateway that provides easy, online access to your medical records. With [employer], you can request a clinic appointment, read your test results, renew a prescription or communicate with your care team.     To access your existing account, please contact your [facility] or call [phone] for assistance.        Care EveryWhere ID     This is your Care EveryWhere ID. This could be used by other organizations to access your [facility] medical records  [id number]        Your Vitals Were     Pulse Height BMI (Body Mass Index)             71 1.708 m (5' 7.24\") 30.6 kg/m2          Blood Pressure from Last 3 Encounters:   06/02/18 108/72   07/21/17 107/76   05/18/17 113/71    Weight from Last 3 Encounters:   06/02/18 89.3 kg (196 lb 12.8 oz)   07/21/17 85.7 kg (189 lb)   05/18/17 87.5 kg (193 lb)              We Performed the Following     [facility] ZOSTER VACCINE RECOMBINANT ADJUVANTED IM NJX     PHYSICAL THERAPY REFERRAL          Today's Medication Changes          These changes are accurate as of 6/2/18  9:23 AM.  If you have any questions, ask your nurse or doctor.               Stop taking these medicines if you haven't already. Please contact your care team if you have questions.     tacrolimus 0.1 % ointment   Commonly known as:  PROTOPIC   Stopped by:  Mely eReves MD                    Primary Care Provider Office " Phone # Fax #    Mely Reeves [phone]       [address] [place] [address] 741  [facility] [address]        Equal Access to Services     MADDIE WEST : Bianka chávez funmi Draper, waalkada luqhilary, qacurryta kaftáimada gianluca, francisco neal laStephaniecarlos goldstein. So [facility] [phone].    ATENCIÓN: Si habla español, tiene a gutierrez disposición servicios gratuitos de asistencia lingüística. Llame al [phone].    We comply with applicable federal civil rights laws and [place] laws. We do not discriminate on the basis of race, color, national origin, age, disability, sex, sexual orientation, or gender identity.            Thank you!     Thank you for choosing [facility]  for your care. Our goal is always to provide you with excellent care. Hearing back from our patients is one way we can continue to improve our services. Please take a few minutes to complete the written survey that you may receive in the mail after your visit with us. Thank you!             Your Updated Medication List - Protect others around you: Learn how to safely use, store and throw away your medicines at www.disposemymeds.org.          This list is accurate as of 6/2/18  9:23 AM.  Always use your most recent med list.                   Brand Name Dispense Instructions for use Diagnosis    aspirin 81 MG tablet      Take 1 tablet by mouth as needed        CALCIUM 1200+D3 600- MG-MG-UNIT Tb24   Generic drug:  Calcium-Magnesium-Vitamin D      daily        citalopram 40 MG tablet    celeXA    30 tablet    Take 1 tablet (40 mg) by mouth daily    Dysthymic disorder       ESTROVEN Tabs      Take 1 tablet by mouth daily.        folic acid 400 MCG tablet    FOLVITE     daily        GLUCOSAMINE CHONDROITIN COMPLX Tabs      Take  by mouth. Occassionally        KLS ALLER-SHERLYN 10 MG tablet   Generic drug:  cetirizine           KLS ALLERCLEAR D-24HR PO           MULTIvitamin  S Caps      Take 1 tablet by mouth daily  Vipul red        OMEGA 3 PO       Routine general medical examination at a health care facility       SOLUBLE FIBER/PROBIOTICS PO      allign    Routine general medical examination at a health care facility       SUPER B COMPLEX/VITAMIN C PO           VITAMIN D (CHOLECALCIFEROL) PO      Take 1 tablet by mouth daily.

## 2018-06-02 NOTE — PATIENT INSTRUCTIONS
[facility]: [phone]     [facility] Medication Refill Request Information:  * Please contact your pharmacy regarding ANY request for medication refills.  ** [facility] Prescription Fax = [phone]  * Please allow 3 business days for routine medication refills.  * Please allow 5 business days for controlled substance medication refills.     [facility] Test Result notification information:  *You will be notified with in 7-10 days of your appointment day regarding the results of your test.  If you are on MyChart you will be notified as soon as the provider has reviewed the results and signed off on them.      Please follow up with Dr Manuel 2-3 months after physical therapy

## 2018-06-02 NOTE — NURSING NOTE
Chief Complaint   Patient presents with     Physical     pt here for physical     Musculoskeletal Problem     pt states she is having right hip pain       Nathalie Priest CMA at 8:32 AM on 6/2/2018.

## 2018-06-03 ASSESSMENT — PATIENT HEALTH QUESTIONNAIRE - PHQ9: SUM OF ALL RESPONSES TO PHQ QUESTIONS 1-9: 5

## 2018-06-04 LAB — HCV AB SERPL QL IA: NONREACTIVE

## 2018-06-05 ENCOUNTER — RADIANT APPOINTMENT (OUTPATIENT)
Dept: MAMMOGRAPHY | Facility: CLINIC | Age: 73
End: 2018-06-05
Attending: INTERNAL MEDICINE
Payer: COMMERCIAL

## 2018-06-05 DIAGNOSIS — Z12.31 VISIT FOR SCREENING MAMMOGRAM: ICD-10-CM

## 2019-05-27 DIAGNOSIS — F34.1 DYSTHYMIC DISORDER: ICD-10-CM

## 2019-05-28 RX ORDER — CITALOPRAM HYDROBROMIDE 40 MG/1
TABLET ORAL
Qty: 90 TABLET | Refills: 0 | Status: SHIPPED | OUTPATIENT
Start: 2019-05-28 | End: 2019-08-14

## 2019-05-28 NOTE — TELEPHONE ENCOUNTER
citalopram (CELEXA) 40 MG tablet      Last Written Prescription Date:  6/2/18  Last Fill Quantity: 90,   # refills: 3  Last Office Visit : 6/2/18  Future Office visit:  none  90 day to pharmacy.    Scheduling has been notified to contact the pt for appointment.    FYI to [facility] - PHQ9 needed

## 2019-07-15 DIAGNOSIS — Z12.31 VISIT FOR SCREENING MAMMOGRAM: ICD-10-CM

## 2019-07-30 ENCOUNTER — DOCUMENTATION ONLY (OUTPATIENT)
Dept: CARE COORDINATION | Facility: CLINIC | Age: 74
End: 2019-07-30

## 2019-08-13 ASSESSMENT — ANXIETY QUESTIONNAIRES
1. FEELING NERVOUS, ANXIOUS, OR ON EDGE: SEVERAL DAYS
7. FEELING AFRAID AS IF SOMETHING AWFUL MIGHT HAPPEN: NOT AT ALL
GAD7 TOTAL SCORE: 2
4. TROUBLE RELAXING: NOT AT ALL
5. BEING SO RESTLESS THAT IT IS HARD TO SIT STILL: NOT AT ALL
7. FEELING AFRAID AS IF SOMETHING AWFUL MIGHT HAPPEN: NOT AT ALL
2. NOT BEING ABLE TO STOP OR CONTROL WORRYING: NOT AT ALL
3. WORRYING TOO MUCH ABOUT DIFFERENT THINGS: NOT AT ALL
6. BECOMING EASILY ANNOYED OR IRRITABLE: SEVERAL DAYS
GAD7 TOTAL SCORE: 2
GAD7 TOTAL SCORE: 2

## 2019-08-13 ASSESSMENT — PATIENT HEALTH QUESTIONNAIRE - PHQ9
SUM OF ALL RESPONSES TO PHQ QUESTIONS 1-9: 2
10. IF YOU CHECKED OFF ANY PROBLEMS, HOW DIFFICULT HAVE THESE PROBLEMS MADE IT FOR YOU TO DO YOUR WORK, TAKE CARE OF THINGS AT HOME, OR GET ALONG WITH OTHER PEOPLE: NOT DIFFICULT AT ALL
SUM OF ALL RESPONSES TO PHQ QUESTIONS 1-9: 2

## 2019-08-14 ENCOUNTER — OFFICE VISIT (OUTPATIENT)
Dept: INTERNAL MEDICINE | Facility: CLINIC | Age: 74
End: 2019-08-14
Payer: COMMERCIAL

## 2019-08-14 VITALS
HEART RATE: 74 BPM | BODY MASS INDEX: 32.81 KG/M2 | DIASTOLIC BLOOD PRESSURE: 77 MMHG | OXYGEN SATURATION: 96 % | WEIGHT: 211 LBS | SYSTOLIC BLOOD PRESSURE: 121 MMHG | TEMPERATURE: 98.6 F

## 2019-08-14 DIAGNOSIS — L29.89 OTHER PRURITUS: ICD-10-CM

## 2019-08-14 DIAGNOSIS — R53.83 TIREDNESS: ICD-10-CM

## 2019-08-14 DIAGNOSIS — F34.1 DYSTHYMIC DISORDER: ICD-10-CM

## 2019-08-14 DIAGNOSIS — G47.10 EXCESSIVE SLEEPINESS: ICD-10-CM

## 2019-08-14 DIAGNOSIS — Z00.00 HEALTH CARE MAINTENANCE: ICD-10-CM

## 2019-08-14 DIAGNOSIS — R53.83 TIREDNESS: Primary | ICD-10-CM

## 2019-08-14 LAB
ALBUMIN SERPL-MCNC: 4 G/DL (ref 3.4–5)
ALP SERPL-CCNC: 77 U/L (ref 40–150)
ALT SERPL W P-5'-P-CCNC: 23 U/L (ref 0–50)
ANION GAP SERPL CALCULATED.3IONS-SCNC: 4 MMOL/L (ref 3–14)
AST SERPL W P-5'-P-CCNC: 13 U/L (ref 0–45)
BASOPHILS # BLD AUTO: 0 10E9/L (ref 0–0.2)
BASOPHILS NFR BLD AUTO: 0.6 %
BILIRUB DIRECT SERPL-MCNC: 0.2 MG/DL (ref 0–0.2)
BILIRUB SERPL-MCNC: 0.5 MG/DL (ref 0.2–1.3)
BUN SERPL-MCNC: 14 MG/DL (ref 7–30)
CALCIUM SERPL-MCNC: 8.7 MG/DL (ref 8.5–10.1)
CHLORIDE SERPL-SCNC: 107 MMOL/L (ref 94–109)
CHOLEST SERPL-MCNC: 244 MG/DL
CO2 SERPL-SCNC: 30 MMOL/L (ref 20–32)
CREAT SERPL-MCNC: 0.8 MG/DL (ref 0.52–1.04)
DIFFERENTIAL METHOD BLD: NORMAL
EOSINOPHIL # BLD AUTO: 0.2 10E9/L (ref 0–0.7)
EOSINOPHIL NFR BLD AUTO: 4.1 %
ERYTHROCYTE [DISTWIDTH] IN BLOOD BY AUTOMATED COUNT: 12.8 % (ref 10–15)
GFR SERPL CREATININE-BSD FRML MDRD: 73 ML/MIN/{1.73_M2}
GLUCOSE SERPL-MCNC: 98 MG/DL (ref 70–99)
HCT VFR BLD AUTO: 44.8 % (ref 35–47)
HDLC SERPL-MCNC: 94 MG/DL
HGB BLD-MCNC: 14.7 G/DL (ref 11.7–15.7)
IMM GRANULOCYTES # BLD: 0 10E9/L (ref 0–0.4)
IMM GRANULOCYTES NFR BLD: 0.8 %
LDLC SERPL CALC-MCNC: 138 MG/DL
LYMPHOCYTES # BLD AUTO: 1.4 10E9/L (ref 0.8–5.3)
LYMPHOCYTES NFR BLD AUTO: 26.3 %
MCH RBC QN AUTO: 32.2 PG (ref 26.5–33)
MCHC RBC AUTO-ENTMCNC: 32.8 G/DL (ref 31.5–36.5)
MCV RBC AUTO: 98 FL (ref 78–100)
MONOCYTES # BLD AUTO: 0.3 10E9/L (ref 0–1.3)
MONOCYTES NFR BLD AUTO: 6 %
NEUTROPHILS # BLD AUTO: 3.2 10E9/L (ref 1.6–8.3)
NEUTROPHILS NFR BLD AUTO: 62.2 %
NONHDLC SERPL-MCNC: 150 MG/DL
NRBC # BLD AUTO: 0 10*3/UL
NRBC BLD AUTO-RTO: 0 /100
PLATELET # BLD AUTO: 292 10E9/L (ref 150–450)
POTASSIUM SERPL-SCNC: 4.5 MMOL/L (ref 3.4–5.3)
PROT SERPL-MCNC: 7.3 G/DL (ref 6.8–8.8)
RBC # BLD AUTO: 4.57 10E12/L (ref 3.8–5.2)
SODIUM SERPL-SCNC: 141 MMOL/L (ref 133–144)
TRIGL SERPL-MCNC: 61 MG/DL
TSH SERPL DL<=0.005 MIU/L-ACNC: 1.51 MU/L (ref 0.4–4)
WBC # BLD AUTO: 5.2 10E9/L (ref 4–11)

## 2019-08-14 RX ORDER — CITALOPRAM HYDROBROMIDE 40 MG/1
40 TABLET ORAL DAILY
Qty: 90 TABLET | Refills: 3 | Status: SHIPPED | OUTPATIENT
Start: 2019-08-14 | End: 2020-10-02

## 2019-08-14 ASSESSMENT — ANXIETY QUESTIONNAIRES
6. BECOMING EASILY ANNOYED OR IRRITABLE: NOT AT ALL
GAD7 TOTAL SCORE: 0
1. FEELING NERVOUS, ANXIOUS, OR ON EDGE: NOT AT ALL
3. WORRYING TOO MUCH ABOUT DIFFERENT THINGS: NOT AT ALL
2. NOT BEING ABLE TO STOP OR CONTROL WORRYING: NOT AT ALL
7. FEELING AFRAID AS IF SOMETHING AWFUL MIGHT HAPPEN: NOT AT ALL
5. BEING SO RESTLESS THAT IT IS HARD TO SIT STILL: NOT AT ALL

## 2019-08-14 ASSESSMENT — PATIENT HEALTH QUESTIONNAIRE - PHQ9
SUM OF ALL RESPONSES TO PHQ QUESTIONS 1-9: 2
5. POOR APPETITE OR OVEREATING: NOT AT ALL

## 2019-08-14 ASSESSMENT — PAIN SCALES - GENERAL: PAINLEVEL: MILD PAIN (2)

## 2019-08-14 NOTE — NURSING NOTE
Chief Complaint   Patient presents with     Medication Follow-up     Pt is here to follow up on medications.      Derm Problem     Pt would like 2 spots looked at.      Pruritis     Pt has itching especially on torso area in the last 4-5 months and pt states its getting worse.      Sleep Problem     Pt is having a sleeping problem.      Erna Davis LPN at 11:30 AM on 8/14/2019.

## 2019-08-14 NOTE — PROGRESS NOTES
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  Due to some employee shortage, had to fill in for people which increased her traveling, had to meet more patient/family needs, etc.  She eventually made the decision to leave her positions and will be going there to fill in only occasionally from now on.  She enjoyed her work, but couldn't keep up with the unreasonable demands.    Torso itchy, constant, has to resist scratching x duration also x 5-6 months   applies cetafil lotion at night which helps a bit.  She has another Maria T hua which works even better.    Also notes generalized achiness    Mentions she has a history of being concerned about having MS.  She doesn't think she has it, but just wanted to mention it to me.  We reviewed her records.   2011/ OV: c/o abnormal sensation on top of right hand and top of rigth foot. 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 Carina Quesada on 2 / 8 /2007.  Preoperatively, presented with symptoms of myelopathy.    Currently has whole body tingling occasionally at night x years, not escalating in frequency or severity    Wonders if vitamin deficiency  Takes ?600 international unit(s) Vit D3 daily  Also takes multivitamin for seniors    She would like me to check a right  "cheek lesion and left upper arm lesion, both of which have been increasing in size.    Fell onto soft grass, \"tweaked\" left knee, trouble putting on pants, now improving    Health care maintenance:  Mammogram negative 7/15/19  Dexa 12/2/13 normal  Colonoscopy 4/28/16 normal  2015 PSG (HANNA), followed up 2017, they advised annual follow up  Immunizations--due for tetanus  ?Shingrix      Answers for HPI/ROS submitted by the patient on 8/13/2019   If you checked off any problems, how difficult have these problems made it for you to do your work, take care of things at home, or get along with other people?: Not difficult at all  PHQ9 TOTAL SCORE: 2  MEI 7 TOTAL SCORE: 2    Other ROS:  10 point ROS is negative except for that noted in the HPI.    Patient Active Problem List   Diagnosis     Dysthymic disorder     Essential hypertension     Obstructive sleep apnea     Contact dermatitis of eyelid, unspecified laterality     Constipation     HANNA (obstructive sleep apnea)     Sleep disturbance     Past Medical History:   Diagnosis Date     Dysthymic disorder 8/4/2011     Obstructive sleep apnea 9/18/2012     Unspecified essential hypertension 9/13/2011     Family History   Problem Relation Age of Onset     Diabetes Mother      Thyroid Disease Mother 60        treated with radioactive iodine     Diabetes Father      C.A.D. Father         silent heart attacks     Glaucoma Father      Colon Cancer Father 70        treated with surgery, 6\" colon removed     Diabetes Maternal Grandfather      Thyroid Disease Sister 30     Social History     Socioeconomic History     Marital status:      Spouse name: Not on file     Number of children: Not on file     Years of education: Not on file     Highest education level: Not on file   Occupational History     Not on file   Social Needs     Financial resource strain: Not on file     Food insecurity:     Worry: Not on file     Inability: Not on file     Transportation needs:     " Medical: Not on file     Non-medical: Not on file   Tobacco Use     Smoking status: Never Smoker     Smokeless tobacco: Never Used   Substance and Sexual Activity     Alcohol use: Yes     Comment: rare     Drug use: No     Sexual activity: Yes     Partners: Male   Lifestyle     Physical activity:     Days per week: Not on file     Minutes per session: Not on file     Stress: Not on file   Relationships     Social connections:     Talks on phone: Not on file     Gets together: Not on file     Attends [employer] service: Not on file     Active member of club or organization: Not on file     Attends meetings of clubs or organizations: Not on file     Relationship status: Not on file     Intimate partner violence:     Fear of current or ex partner: Not on file     Emotionally abused: Not on file     Physically abused: Not on file     Forced sexual activity: Not on file   Other Topics Concern     Parent/sibling w/ CABG, MI or angioplasty before 65F 55M? Not Asked      Service Not Asked     Blood Transfusions Not Asked     Caffeine Concern Not Asked     Occupational Exposure Not Asked     Hobby Hazards Not Asked     Sleep Concern Not Asked     Stress Concern Not Asked     Weight Concern Not Asked     Special Diet Not Asked     Back Care Not Asked     Exercise Yes     Comment: goes to a gym and does strength 2/wk and cardiovascular 3/wk     Bike Helmet Not Asked     Seat Belt Not Asked     Self-Exams Not Asked   Social History Narrative     Not on file     Current Outpatient Medications   Medication Sig Dispense Refill     aspirin 81 MG tablet Take 1 tablet by mouth as needed        B Complex-C (SUPER B COMPLEX/VITAMIN C PO)        Calcium-Magnesium-Vitamin D (CALCIUM 1200+D3) 600- MG-MG-UNIT TB24 daily       cetirizine (KLS ALLER-SHERLYN) 10 MG tablet        citalopram (CELEXA) 40 MG tablet TAKE 1 TABLET BY MOUTH EVERY DAY 90 tablet 0     folic acid (FOLVITE) 400 MCG tablet daily       Glucosamine-Chondroit-Vit  C-Mn (GLUCOSAMINE CHONDROITIN COMPLX) TABS Take  by mouth. Occassionally       Loratadine-Pseudoephedrine (KLS ALLERCLEAR D-24HR PO)        Multiple Vitamin (MULTIVITAMINS) CAPS Take 1 tablet by mouth daily Vipul red       Nutritional Supplements (ESTROVEN) TABS Take 1 tablet by mouth daily.       Omega-3 Fatty Acids (OMEGA 3 PO)        Probiotic Product (SOLUBLE FIBER/PROBIOTICS PO) allign       VITAMIN D, CHOLECALCIFEROL, PO Take 1 tablet by mouth daily.       Allergies   Allergen Reactions     Cats Itching     Seasonal Allergies      Eyes, throat sinus     Smoke. Itching     Watery eyes     /77   Pulse 74   Temp 98.6  F (37  C) (Oral)   Wt 95.7 kg (211 lb)   SpO2 96%   BMI 32.81 kg/m    Physical Examination:    General:  Conversant, generally healthy appearing, no acute distress  Head: atraumatic  Eyes:  Pupils 2-3 mm, sclera white, EOM's full, conjunctiva moist, no lid lag    Ears:  TM's normal, EAC's patent  Nose:  Nasal passages clear, turbinates not swollen  Throat/Mouth:  No pharyngeal erythema, exudate, ulcers, oral mucosa and tongue moist, normal hard and soft palate  Neck:  Trachea midline, Full AROM, supple, thyroid smooth, symmetric, not enlarged, no nodules, no neck lymphadenopathy  Lungs:  Clear to auscultation throughout, no wheezes, rhonchi or rales. Normal respiratory effort and no intercostal retractions.  C/V:  Regular rate and rhythm, no murmurs, rubs or gallops.  No JVD, normal carotid upstroke and amplitude, no carotid bruits.  Abdomen:  Not distended.  Bowel sounds active.  No tenderness, no hepatosplenomegaly or masses.  No CVA tenderness or masses.  Lymph:  No cervical lymph nodes.  Neuro: Alert and oriented, face symmetric. No tremor.   M/S:   No joint deformities noted.  No joint swelling.  Skin:   Normal temperature., turgor and texture. No rashes, jaundice or ulcers.  On her right cheek there is a small, 2-3 mm, slightly raised, flesh colored papule with telangiectasia.  Left  upper, inner arm has a benign appearing lesion, approx. 4-5 mm max diameter, c/w SK.   Extremities:  No peripheral edema, no digital cyanosis  Psych:  Alert and oriented to person, place and time. Appropriate affect.  Not psychomotor slowed.  No signs of anxiety or agitation.    Toshia was seen today for medication follow-up, derm problem, pruritis and sleep problem.    Diagnoses and all orders for this visit:    Tiredness  -     CBC with platelets differential; Future  -     TSH with free T4 reflex; Future  -     Vitamin D Deficiency; Future  -     Hepatic panel; Future  -     Basic metabolic panel; Future  -     Vit D, Future    Dysthymic disorder  -     Refill citalopram (CELEXA) 40 MG tablet; Take 1 tablet (40 mg) by mouth daily    Other pruritus   Check labs, continue using emollients, observe for potential allergens, irritants.  Keep skin folds dry with powder.    Health care maintenance  -     Lipid panel reflex to direct LDL Fasting; Future    Excessive sleepiness, hx HANNA w/CPAP  -     SLEEP EVALUATION & MANAGEMENT REFERRAL - [facility] [phone] (Age 15 and up); Future    Other orders  -     TD PRESERVATIVE FREE, AGE >=7 YR    F/U in 4 weeks.    Total time spent 40 minutes.  More than 50% of the time spent with Ms. Caruso on counseling / coordinating her care    Mely Reeves M.D.  Internal Medicine  Primary Care Center   pager [phone]

## 2019-08-14 NOTE — PATIENT INSTRUCTIONS
[facility] Medication Refill Request Information:  * Please contact your pharmacy regarding ANY request for medication refills.  ** [facility] Prescription Fax = [phone]  * Please allow 3 business days for routine medication refills.  * Please allow 5 business days for controlled substance medication refills.     [facility] Test Result notification information:  *You will be notified with in 7-10 days of your appointment day regarding the results of your test.  If you are on MyChart you will be notified as soon as the provider has reviewed the results and signed off on them.    [facility]: [phone]         Here is some advice regarding bone health:  1.  Make sure you are getting enough calcium and vitamin D.    Total diet plus supplement recommended intake for calcium is:    1000 mg daily for ages 19-50, 1200 mg for ages 51+.  Total diet plus supplement recommended intake for Vitamin D is:    600 international units (IU) daily for ages 18-70 and 800 IU daily for age greater  than 70 .  Your provider may advise more than the standard intake based on individual medical history.  2.    Do not take more than the recommended amount of Vitamin D without consulting your provider. Vitamin D toxicity can cause non-specific symptoms such as anorexia, weight loss, polyuria, and heart arrhythmias. More seriously, it can also raise blood levels of calcium which leads to vascular and tissue calcification, with subsequent damage to the heart, blood vessels, and kidneys.  3.  Do not take more than the recommended amount of Calcium without consulting your provider.  Excessively high levels of calcium in the blood known as hypercalcemia can cause renal insufficiency, vascular and soft tissue calcification, hypercalciuria (high levels of calcium in the urine) and kidney stones.  4.  Calcium Carbonate should be taken with food.  Calcium Citrate can be taken with or without food.  5.  Physical activity is  important. Consider activities such as walking, water workouts or   brittnee chi . Include resistance type exercises.  6.  In order for your skin to manufacture enough Vitamin D, you need some exposure to the sun.  Recommendation is approximately 5-30 minutes of sun exposure between 10 AM and 3 PM at least twice a week to the face, arms, legs, or back without sunscreen.  Do not use tanning beds.  7.  Decrease your risk of falls.  Wear sensible shoes. Review your medications with your provider. See additional recommendations below.   8.  See tables below regarding the content of calcium and Vitamin D in various foods.      Reduce your risk of falls:  Remove home hazards  Take a look around your home. Your living room, kitchen, bedroom, bathroom, hallways and stairways may be filled with hazards. To make your home safer:     Remove boxes, newspapers, electrical cords and phone cords from walkways.     Move coffee tables, magazine racks and plant stands from high-traffic areas.     Secure loose rugs with double-faced tape, tacks or a slip-resistant backing -- or remove loose rugs from your home.     Repair loose, wooden floorboards and carpeting right away.     Store clothing, dishes, food and other necessities within easy reach.     Immediately clean spilled liquids, grease or food.     Use nonskid floor wax.     Use nonslip mats in your bathtub or shower.   Light up your living space  Keep your home brightly lit to avoid tripping on objects that are hard to see. Also:     Place night lights in your bedroom, bathroom and hallways.     Place a lamp within reach of your bed for middle-of-the-night needs.     Make clear paths to light switches that aren't near room entrances. Consider trading traditional switches for glow-in-the-dark or illuminated switches.     Turn on the lights before going up or down stairs.     Store flashlights in easy-to-find places in case of power outages.   Use assistive devices  Your doctor might  "recommend using a cane or walker to keep you steady. Other assistive devices can help, too. For example:     Hand rails for both sides of stairways     Nonslip treads for bare-wood steps     A raised toilet seat or one with armrests     Grab bars for the shower or tub     A sturdy plastic seat for the shower or tub -- plus a hand-held shower nozzle for bathing while sitting down   Source: http://[email or web address]/health/fall-prevention/      Calcium Content of Foods:     Food Milligrams (mg)  per serving     Yogurt, plain, low fat, 8 ounces 415    Orange juice, calcium-fortified, 6 ounces 375    Yogurt, fruit, low fat, 8 ounces 338-384    Mozzarella, part skim, 1.5 ounces 333    Sardines, canned in oil, with bones, 3 ounces 325    Cheddar cheese, 1.5 ounces 307    Milk, nonfat, 8 ounces 299    Milk, reduced-fat (2% milk fat), 8 ounces 293    Milk, buttermilk, 8 ounces 282-350    Milk, whole (3.25% milk fat), 8 ounces 276    Tofu, firm, made with calcium sulfate,   cup 253    Hepzibah, pink, canned, solids with bone, 3 ounces 181    Cottage cheese, 1% milk fat, 1 cup 138    Tofu, soft, made with calcium sulfate,   cup 138    Instant breakfast drink, various flavors and brands, powder prepared with water, 8 ounces 105-250    Frozen yogurt, vanilla, soft serve,   cup  103    Ready-to-eat cereal, calcium-fortified, 1 cup  100-1,000    Turnip greens, fresh, boiled,   cup  99    Kale, fresh, cooked, 1 cup  94    Kale, raw, chopped, 1 cup  90    Ice cream, vanilla,   cup  84    Soy beverage, calcium-fortified, 8 ounces       Chinese cabbage, bok roman, raw, shredded, 1 cup  74    Bread, white, 1 slice  73    Pudding, chocolate, ready to eat, refrigerated, 4 ounces  55    Tortilla, corn, ready-to-bake/gerard, one 6\" diameter 46    Tortilla, flour, ready-to-bake/gerard, one 6\" diameter  32    Sour cream, reduced fat, cultured, 2 tablespoons  31    Bread, whole-wheat, 1 slice  30    Broccoli, raw,   cup  21    Cheese, cream, " regular, 1 tablespoon  14    Source:  http://ods.od.nih.gov/factsheets    Selected Food Sources of Vitamin D [ Food IUs per serving]:    Cod liver oil, 1 tablespoon 1,360  Swordfish, cooked, 3 ounces 566  Kahlotus (sockeye), cooked, 3 ounces 447  Tuna fish, canned in water, drained, 3 ounces 154   Orange juice fortified with vitamin D, 1 cup (check product labels, as amount of added vitamin D varies) 137   Milk, nonfat, reduced fat, and whole, vitamin D-fortified, 1 cup 115-124   Yogurt, fortified with 20% of the DV for vitamin D, 6 ounces (more heavily fortified yogurts provide more of the DV) 80  Margarine, fortified, 1 tablespoon 60   Sardines, canned in oil, drained, 2 sardines 46  Liver, beef, cooked, 3 ounces 42  Egg, 1 large (vitamin D is found in yolk) 41  Ready-to-eat cereal, fortified with 10% of the DV for vitamin D, 0.75-1 cup (more heavily fortified cereals might provide more of the DV) 40   Cheese, Swiss, 1 ounce.  Source:  Http://ods.od.nih.gov/factsheets

## 2019-08-14 NOTE — NURSING NOTE
Patient received TD vaccine. Vaccine was given under the supervision of Dr. Reeves. See immunization list for administration details. Pt was advised to remain in [facility] lobby for 15 minutes in case of an averse reaction. Given by MARGIE Roper 12:24 PM 8/14/2019

## 2019-08-15 LAB — DEPRECATED CALCIDIOL+CALCIFEROL SERPL-MC: 32 UG/L (ref 20–75)

## 2019-09-09 NOTE — PROGRESS NOTES
"Sleep Follow-Up Visit:    Date on this visit: 9/10/2019    Toshia Caruso is a 75 yo F with pmh HANNA on CPAP, HTN, dysthymia comes in today for follow-up. PSG was performed on 11/29/2015 with an AHI of 17/hr. Last visit was 7/21/2017.     This is second machine, notes that the first machine would \"push air into her when she would not breathe on her own\". Has had this machine for at least 2 years. She has gained about 25 pounds since her study in 2015. Her  has told her that she is snoring at times despite the CPAP therapy.   She continues to take appropriate care of the machine, cleans mask and tubing regularly.    PAP download was reviewed:  Used the device 30/30days  More than 4 hours of use: 100%  Average daily use on the days used was 8.75 hours  Pressure 7.2 cm H2O  Residual AHI 7.2  Leak 3.2 L/min    ESS: 4    Past medical/surgical history, family history, social history, medications and allergies were reviewed.      Problem List:  Patient Active Problem List    Diagnosis Date Noted     HANNA (obstructive sleep apnea) 06/02/2018     Priority: Medium     Sleep disturbance 06/02/2018     Priority: Medium     Constipation 03/07/2016     Priority: Medium     Contact dermatitis of eyelid, unspecified laterality 12/13/2015     Priority: Medium     Obstructive sleep apnea 09/18/2012     Priority: Medium     Essential hypertension 09/13/2011     Priority: Medium     Problem list name updated by automated process. Provider to review       Dysthymic disorder 08/04/2011     Priority: Medium        Medications:    Current Outpatient Medications   Medication Sig Dispense Refill     aspirin 81 MG tablet Take 1 tablet by mouth as needed        B Complex-C (SUPER B COMPLEX/VITAMIN C PO)        cetirizine (KLS ALLER-SHERLYN) 10 MG tablet        citalopram (CELEXA) 40 MG tablet Take 1 tablet (40 mg) by mouth daily 90 tablet 3     folic acid (FOLVITE) 400 MCG tablet daily       Glucosamine-Chondroit-Vit C-Mn (GLUCOSAMINE " "CHONDROITIN COMPLX) TABS Take  by mouth. Occassionally       Loratadine-Pseudoephedrine (KLS ALLERCLEAR D-24HR PO)        Multiple Vitamin (MULTIVITAMINS) CAPS Take 1 tablet by mouth daily Vipul red       Nutritional Supplements (ESTROVEN) TABS Take 1 tablet by mouth daily.       Omega-3 Fatty Acids (OMEGA 3 PO)        Probiotic Product (SOLUBLE FIBER/PROBIOTICS PO) allign       VITAMIN D, CHOLECALCIFEROL, PO Take 1 tablet by mouth daily.         Physical Exam:  /82   Pulse 84   Resp 16   Ht 1.702 m (5' 7\")   Wt 96.6 kg (213 lb)   SpO2 96%   BMI 33.36 kg/m      General: No apparent distress, appropriately groomed  Head: Normocephalic, atraumatic  Eyes: no icterus  Mouth: op pink and moist  Neck: Supple  Chest: Breathing comfortably on room air, symmetric chest rise  Musculoskeletal: No edema noted  Skin: Warm, dry, intact  Psych: Mood pleasant, affect congruent    Results:    Impression/Plan:  Toshia Caruso is a 73 yo F with pmh HANNA on CPAP, HTN, dysthymia presenting for HANNA follow up.    Obstructive Sleep Apnea  Machine data shows 100% compliance. HANNA is adequately controlled, however AHI remains at 7.2. Given weight gain and report of snoring despite use, will convert to auto CPAP with range 7 - 10 cm H2O, with goal of AHI <5.  Recommend to continue good compliance. Continue to get the equipment replaced on a regular basis and maintain machine as recommended.     Encourage weight loss, healthy diet, and exercise.    Patient was strongly advised to avoid driving, operating any heavy machinery or other hazardous situations while drowsy or sleepy.  Patient was counseled on the importance of driving while alert, to pull over if drowsy, or nap before getting into the vehicle if sleepy.      Follow up by telephone with Dr. Kruger in 2 months to review download.   Follow up in clinic in 1 year, sooner if there are issues.  ?  Seen and examined with Dr. Kruger  ?  Nemesio Aleman MD   Sleep Medicine " "Fellow    Attending: As the attending physician I was present with  Dr. Nemesio Aleman ,  during this clinic visit. I personally reviewed the key aspects of the history and physical exam and I agree with the above documentation.  \"I spent a total of 15 minutes face to face with Toshia Caruso during today's office visit. Over 50% of this time was spent counseling the patient and  coordinating care regarding HANNA, CPAP treatment.\"       Breana Resendiz MD   of Medicine,  Division of Pulmonary/Sleep Medicine  [facility].    "

## 2019-09-10 ENCOUNTER — OFFICE VISIT (OUTPATIENT)
Dept: SLEEP MEDICINE | Facility: CLINIC | Age: 74
End: 2019-09-10
Payer: COMMERCIAL

## 2019-09-10 VITALS
SYSTOLIC BLOOD PRESSURE: 139 MMHG | BODY MASS INDEX: 33.43 KG/M2 | OXYGEN SATURATION: 96 % | DIASTOLIC BLOOD PRESSURE: 82 MMHG | HEART RATE: 84 BPM | RESPIRATION RATE: 16 BRPM | WEIGHT: 213 LBS | HEIGHT: 67 IN

## 2019-09-10 DIAGNOSIS — G47.33 OSA ON CPAP: Primary | ICD-10-CM

## 2019-09-10 DIAGNOSIS — G47.10 EXCESSIVE SLEEPINESS: ICD-10-CM

## 2019-09-10 PROCEDURE — 99213 OFFICE O/P EST LOW 20 MIN: CPT | Performed by: STUDENT IN AN ORGANIZED HEALTH CARE EDUCATION/TRAINING PROGRAM

## 2019-09-10 ASSESSMENT — MIFFLIN-ST. JEOR: SCORE: 1503.79

## 2019-09-10 NOTE — PATIENT INSTRUCTIONS
Your BMI is Body mass index is 33.36 kg/m .  Weight management is a personal decision.  If you are interested in exploring weight loss strategies, the following discussion covers the approaches that may be successful. Body mass index (BMI) is one way to tell whether you are at a healthy weight, overweight, or obese. It measures your weight in relation to your height.  A BMI of 18.5 to 24.9 is in the healthy range. A person with a BMI of 25 to 29.9 is considered overweight, and someone with a BMI of 30 or greater is considered obese. More than two-thirds of American adults are considered overweight or obese.  Being overweight or obese increases the risk for further weight gain. Excess weight may lead to heart disease and diabetes.  Creating and following plans for healthy eating and physical activity may help you improve your health.  Weight control is part of healthy lifestyle and includes exercise, emotional health, and healthy eating habits. Careful eating habits lifelong are the mainstay of weight control. Though there are significant health benefits from weight loss, long-term weight loss with diet alone may be very difficult to achieve- studies show long-term success with dietary management in less than 10% of people. Attaining a healthy weight may be especially difficult to achieve in those with severe obesity. In some cases, medications, devices and surgical management might be considered.  What can you do?  If you are overweight or obese and are interested in methods for weight loss, you should discuss this with your provider.     Consider reducing daily calorie intake by 500 calories.     Keep a food journal.     Avoiding skipping meals, consider cutting portions instead.    Diet combined with exercise helps maintain muscle while optimizing fat loss. Strength training is particularly important for building and maintaining muscle mass. Exercise helps reduce stress, increase energy, and improves fitness.  Increasing exercise without diet control, however, may not burn enough calories to loose weight.       Start walking three days a week 10-20 minutes at a time    Work towards walking thirty minutes five days a week     Eventually, increase the speed of your walking for 1-2 minutes at time    In addition, we recommend that you review healthy lifestyles and methods for weight loss available through the National Institutes of Health patient information sites:  http://win.niddk.nih.gov/publications/index.htm    And look into health and wellness programs that may be available through your health insurance provider, employer, local community center, or danette club.    Weight management plan: Patient was referred to their PCP to discuss a diet and exercise plan.

## 2019-09-19 ENCOUNTER — OFFICE VISIT (OUTPATIENT)
Dept: INTERNAL MEDICINE | Facility: CLINIC | Age: 74
End: 2019-09-19
Payer: COMMERCIAL

## 2019-09-19 VITALS
TEMPERATURE: 98.3 F | WEIGHT: 213.2 LBS | SYSTOLIC BLOOD PRESSURE: 114 MMHG | BODY MASS INDEX: 33.39 KG/M2 | DIASTOLIC BLOOD PRESSURE: 76 MMHG | OXYGEN SATURATION: 95 % | HEART RATE: 73 BPM

## 2019-09-19 DIAGNOSIS — K29.00 ACUTE GASTRITIS WITHOUT HEMORRHAGE, UNSPECIFIED GASTRITIS TYPE: ICD-10-CM

## 2019-09-19 DIAGNOSIS — M25.562 LEFT KNEE PAIN, UNSPECIFIED CHRONICITY: ICD-10-CM

## 2019-09-19 DIAGNOSIS — R19.5 DARK STOOLS: ICD-10-CM

## 2019-09-19 DIAGNOSIS — Z80.0 FAMILY HISTORY OF COLON CANCER: ICD-10-CM

## 2019-09-19 DIAGNOSIS — E78.2 MIXED HYPERLIPIDEMIA: ICD-10-CM

## 2019-09-19 DIAGNOSIS — E78.2 MIXED HYPERLIPIDEMIA: Primary | ICD-10-CM

## 2019-09-19 LAB
CHOLEST SERPL-MCNC: 232 MG/DL
FERRITIN SERPL-MCNC: 66 NG/ML (ref 8–252)
HDLC SERPL-MCNC: 86 MG/DL
IRON SATN MFR SERPL: 39 % (ref 15–46)
IRON SERPL-MCNC: 109 UG/DL (ref 35–180)
LDLC SERPL CALC-MCNC: 131 MG/DL
NONHDLC SERPL-MCNC: 146 MG/DL
TIBC SERPL-MCNC: 276 UG/DL (ref 240–430)
TRIGL SERPL-MCNC: 76 MG/DL

## 2019-09-19 ASSESSMENT — PAIN SCALES - GENERAL: PAINLEVEL: MILD PAIN (3)

## 2019-09-19 NOTE — PATIENT INSTRUCTIONS
[facility] Medication Refill Request Information:  * Please contact your pharmacy regarding ANY request for medication refills.  ** [facility] Prescription Fax = [phone]  * Please allow 3 business days for routine medication refills.  * Please allow 5 business days for controlled substance medication refills.     [facility] Test Result notification information:  *You will be notified with in 7-10 days of your appointment day regarding the results of your test.  If you are on MyChart you will be notified as soon as the provider has reviewed the results and signed off on them.    [facility]: [phone]       Your health care Provider has recommended that you receive the new Shingle vaccine called Shingrix to prevent shingles for ages 50 and above. Many private insurance and Medicare Part D plans cover Shingrix. However, not all insurance carriers cover the entire cost of the Shingrix vaccine if the vaccine is administered at your primary care clinic. The clinic cannot determine your insurance benefits.  Please call your insurance carrier prior. The vaccine comes in two doses. Your second dose should be 2-6 months from your first dose.     There is a nationwide shortage of the Shingle vaccine. Because the second dose must be given 2-6 months from the first dose, the CDC DOES NOT recommend your 1st Shingle vaccine until the vaccine is back in stock next year 2020.  Currently, our clinic shortage is reserved for 2nd dose only (those who already had their first Shingrix vaccine).  You may check with your Pharmacy if you want the vaccine for this year.       Prior to receiving the vaccine, we recommend that you call your insurance carrier and ask them the following questions:            1. Is there a cost difference if I receive the vaccine at my doctor's office or a pharmacy?          2. Does my insurance cover the Shingrix Vaccine and administration of the vaccine?          3. What is my co-pay  or deductible for the vaccine?        Nurse Visit hours are available Monday, Wednesday, and Friday from 9:00 AM-11:00 AM and 1:00 PM-3:00 PM.     Patient Education     Lifestyle Changes to Control Cholesterol  You can control your cholesterol through diet, exercise, weight management, quitting smoking, stress management, and taking your medicines right. These things can also lower your risk for cardiovascular disease.    Eating healthy  Your healthcare provider will give you information on diet changes you may need to make. Your provider may recommend that you see a registered dietitian for help with diet changes. Changes may include:    Cutting back on the amount of fat and cholesterol in your meals    Eating less salt (sodium). This is especially important if you have high blood pressure.    Eating more fresh vegetables and fruits    Eating lean proteins such as fish, poultry, beans, and peas    Eating less red meat and processed meats    Using low-fat dairy products    Using vegetable and nut oils in limited amounts    Limiting how many sweets and processed foods like chips, cookies, and baked goods that you eat     Limiting how many sugar-sweetened beverages you drink    Limiting how often you eat out  Getting exercise  Regular exercise is a good way to help your body control cholesterol. Regular exercise can help in many ways. It can:    Raise your good cholesterol    Help lower your bad cholesterol    Let blood flow better through your body    Give more oxygen to your muscles and tissues    Help you manage your weight    Help your heart pump better    Lower your blood pressure  Your healthcare provider may recommend that you get more physical activity if you haven't been active. Your provider may recommend that you get moderate to vigorous physical activity for at least 40 minutes each day. You should do this for at least 3 to 4 days each week. A few examples of moderate to vigorous activity are:    Walking  at a brisk pace. This is about 3 to 4 miles per hour.    Jogging or running    Swimming or water aerobics    Hiking    Dancing    Martial arts    Tennis    Riding a bicycle or stationary bike    Dancing  Managing your weight  If you are overweight or obese, your healthcare provider will work with you to help you lose weight and lower your BMI (body mass index). Making diet changes and getting more physical activity can help. Changing your diet will help you lose weight more easily than adding exercise.  Quitting smoking  Smoking and other tobacco use can raise cholesterol and make it harder to control. Quitting is tough. But millions of people have given up tobacco for good. You can quit, too! Think about some of the reasons below to quit smoking. Do any of them make you think twice about your smoking habit?  Stop smoking because it:    Keeps your cholesterol high, even if you make all the other changes you re supposed to    Damages your body. It especially harms your heart, lungs, skin, and blood vessels.    Makes you more likely to have a heart attack (acute myocardial infarction), stroke, or cancer    Stains your teeth    Makes your skin, clothes, and breath smell bad    Costs a lot of money  Controlling stress   Learn ways to control stress. This will help you deal with stress in your home and work life. Controlling stress can greatly lower your risk of getting cardiovascular disease.  Making the most of medicines  Healthy eating and exercise are a good start to keeping your cholesterol down. But you may need some extra help from medicine. If your doctor prescribes medicine, be sure to take it exactly as directed. Remember:    Tell your healthcare provider about all other medicines you take. This includes vitamins and herbs.    Tell your healthcare provider if you have any side effects after starting to take a medicine. Examples of side effects to watch for include muscle aches, weakness, blurred vision,  rust-colored urine, yellowing of eyes or skin (jaundice), and headache.    Don t skip a dose or stop taking your medicine because you feel better or because your cholesterol numbers go down. Never stop taking your medicine unless your healthcare provider has told you it s OK.    Ask your healthcare provider if you have any questions about your medicines.  High risk groups  Some people may need to take medicines called statins to control their cholesterol. This is in addition to eating a healthy diet and getting regular exercise.  Statins can help you stay healthy. They can also help prevent a heart attack or stroke. You may need to take a statin if you are in one of these groups:    Adults who have had a heart attack or stroke. Or adults who have had peripheral vascular disease, a ministroke (transient ischemic attack), or stable or unstable angina. This group also includes people who have had a procedure to restore blood flow through a blocked artery. These procedures include percutaneous coronary intervention, angioplasty, stent, and open-heart bypass surgery.    Adults who have diabetes. Or adults who are at higher risk of having a heart attack or stroke and have an LDL cholesterol level of 70 to 189 mg/dL    Adults who are 21 years old or older and have an LDL cholesterol level of 190 mg/dL or higher.  If you are in a high-risk group, talk with your healthcare provider about your treatment goals. Make sure you understand why these goals are important, based on your own health history and your family history of heart disease or high cholesterol.  Make a plan to have regular cholesterol checks. You may need to fast before getting this test. Also ask your provider about any side effects your medicines may cause. Let your provider know about any side effects you have. You may need to take more than one medicine to reach the cholesterol goals that you and your provider decide on.  Date Last Reviewed: 10/1/2016     3087-6293 [employer]. [address] [place] [address], [place] [address]. All rights reserved. This information is not intended as a substitute for professional medical care. Always follow your healthcare professional's instructions.

## 2019-09-19 NOTE — PROGRESS NOTES
"CC:  Test results and other concerns    HPI:  Discussed lipids and ASCVD risk analysis as noted below.  Handout given as well.  Advised her to discontinue aspirin, discussed GI bleed risks.  Discussed indications for moderate dose statin.  She prefers to work on lifestyle modification. Discussed diet, exercise, weight management.  Handout given.    Had 2 days of \"dark stool\" recently, self-limited without symptoms.  Advised no NSAIDS, alcohol, caffeine.  Due for colonoscopy.  Reviewed the one from 2016 (normal).  Father with hx colon cancer. Was advised to have one this year, order placed.      Twisted left knee about a month ago, improved, but still has variable intensity pain with ambulation, more so with going up an down stairs.  Offered PT evaluation and she is interested in this.  No giveway or locking sensation.  No swelling.    Going on a cruise to [place], near [place] and other countries this winter.  Looking forward to this.        Component      Latest Ref Rng & Units 6/2/2018 8/14/2019   Cholesterol      <200 mg/dL 225 (H) 244 (H)   Triglycerides      <150 mg/dL 58 61   HDL Cholesterol      >49 mg/dL 88 94   LDL Cholesterol Calculated      <100 mg/dL 126 (H) 138 (H)   Non HDL Cholesterol      <130 mg/dL 138 (H) 150 (H)     The 10-year ASCVD risk score (Fabienkori DEAL Jr., et al., 2013) is: 10.5%    Values used to calculate the score:      Age: 74 years      Sex: Female      Is Non- : No      Diabetic: No      Tobacco smoker: No      Systolic Blood Pressure: 114 mmHg      Is BP treated: No      HDL Cholesterol: 94 mg/dL      Total Cholesterol: 244 mg/dL    The guideline emphasizes that lifestyle modification remains a critical component of ASCVD reduction.  Four groups most likely to benefit from statin therapy are identified:  1. Patients with any form of clinical ASCVD  2. Patients with primary LDL-C levels of 190 mg per dL or greater  3. Patients with diabetes mellitus, 40 to 75 " years of age, with LDL-C levels of 70 to 189 mg per dL  4. Patients without diabetes, 40 to 75 years of age, with an estimated 10-year ASCVD risk ? 7.5%    Patient Active Problem List   Diagnosis     Dysthymic disorder     Essential hypertension     Obstructive sleep apnea     Contact dermatitis of eyelid, unspecified laterality     Constipation     HANNA (obstructive sleep apnea)     Sleep disturbance     Current Outpatient Medications   Medication Sig Dispense Refill     B Complex-C (SUPER B COMPLEX/VITAMIN C PO)        cetirizine (KLS ALLER-SHERLYN) 10 MG tablet        citalopram (CELEXA) 40 MG tablet Take 1 tablet (40 mg) by mouth daily 90 tablet 3     diphenhydrAMINE-APAP, sleep, (TYLENOL PM EXTRA STRENGTH PO) Take by mouth as needed       folic acid (FOLVITE) 400 MCG tablet daily       Glucosamine-Chondroit-Vit C-Mn (GLUCOSAMINE CHONDROITIN COMPLX) TABS Take  by mouth. Occassionally       Loratadine-Pseudoephedrine (KLS ALLERCLEAR D-24HR PO)        Multiple Vitamin (MULTIVITAMINS) CAPS Take 1 tablet by mouth daily Vipul red       NAPROXEN PO Take by mouth as needed for moderate pain       Nutritional Supplements (ESTROVEN) TABS Take 1 tablet by mouth daily.       Omega-3 Fatty Acids (OMEGA 3 PO)        Probiotic Product (SOLUBLE FIBER/PROBIOTICS PO) allign       VITAMIN D, CHOLECALCIFEROL, PO Take 1 tablet by mouth daily.       Allergies   Allergen Reactions     Cats Itching     Seasonal Allergies      Eyes, throat sinus     Smoke. Itching     Watery eyes     /76 (BP Location: Right arm, Patient Position: Sitting, Cuff Size: Adult Large)   Pulse 73   Temp 98.3  F (36.8  C) (Oral)   Wt 96.7 kg (213 lb 3.2 oz)   SpO2 95%   BMI 33.39 kg/m      Toshia was seen today for lipids.    Diagnoses and all orders for this visit:    Mixed hyperlipidemia  -     Lipid panel reflex to direct LDL Fasting; Future    Left knee pain, unspecified chronicity  -     PHYSICAL THERAPY REFERRAL; Future    Family history of colon  cancer  -     GASTROENTEROLOGY ADULT REF PROCEDURE ONLY    Dark stools, Acute gastritis (suspect, due to NSAID use, vs. Not related to bleeding)  -     Ferritin; Future  -     Iron and iron binding capacity; Future    See details of discussion in HPI  Total time spent 30 minutes.  More than 50% of the time spent with Ms. Caruso on counseling / coordinating her care    F/U in approx 6 months with lipid panel    Mely Reeves M.D.  Internal Medicine  [facility]   pager [phone]

## 2019-09-23 ENCOUNTER — THERAPY VISIT (OUTPATIENT)
Dept: PHYSICAL THERAPY | Facility: CLINIC | Age: 74
End: 2019-09-23
Attending: INTERNAL MEDICINE
Payer: COMMERCIAL

## 2019-09-23 DIAGNOSIS — M25.562 LEFT KNEE PAIN: ICD-10-CM

## 2019-09-23 DIAGNOSIS — M25.562 LEFT KNEE PAIN, UNSPECIFIED CHRONICITY: ICD-10-CM

## 2019-09-23 PROCEDURE — 97112 NEUROMUSCULAR REEDUCATION: CPT | Mod: GP | Performed by: PHYSICAL THERAPIST

## 2019-09-23 PROCEDURE — 97161 PT EVAL LOW COMPLEX 20 MIN: CPT | Mod: GP | Performed by: PHYSICAL THERAPIST

## 2019-09-23 PROCEDURE — 97110 THERAPEUTIC EXERCISES: CPT | Mod: GP | Performed by: PHYSICAL THERAPIST

## 2019-09-23 ASSESSMENT — ACTIVITIES OF DAILY LIVING (ADL)
SQUAT: ACTIVITY IS VERY DIFFICULT
GO DOWN STAIRS: ACTIVITY IS VERY DIFFICULT
LIMPING: THE SYMPTOM AFFECTS MY ACTIVITY MODERATELY
SWELLING: NOT ANSWERED
KNEE_ACTIVITY_OF_DAILY_LIVING_SUM: 35
RISE FROM A CHAIR: ACTIVITY IS FAIRLY DIFFICULT
HOW_WOULD_YOU_RATE_THE_CURRENT_FUNCTION_OF_YOUR_KNEE_DURING_YOUR_USUAL_DAILY_ACTIVITIES_ON_A_SCALE_FROM_0_TO_100_WITH_100_BEING_YOUR_LEVEL_OF_KNEE_FUNCTION_PRIOR_TO_YOUR_INJURY_AND_0_BEING_THE_INABILITY_TO_PERFORM_ANY_OF_YOUR_USUAL_DAILY_ACTIVITIES?: 75
STAND: ACTIVITY IS MINIMALLY DIFFICULT
WEAKNESS: I HAVE THE SYMPTOM BUT IT DOES NOT AFFECT MY ACTIVITY
HOW_WOULD_YOU_RATE_THE_OVERALL_FUNCTION_OF_YOUR_KNEE_DURING_YOUR_USUAL_DAILY_ACTIVITIES?: SEVERELY ABNORMAL
GIVING WAY, BUCKLING OR SHIFTING OF KNEE: I DO NOT HAVE THE SYMPTOM
KNEEL ON THE FRONT OF YOUR KNEE: NOT ANSWERED
PAIN: THE SYMPTOM AFFECTS MY ACTIVITY MODERATELY
STIFFNESS: I DO NOT HAVE THE SYMPTOM
GO UP STAIRS: ACTIVITY IS FAIRLY DIFFICULT
AS_A_RESULT_OF_YOUR_KNEE_INJURY,_HOW_WOULD_YOU_RATE_YOUR_CURRENT_LEVEL_OF_DAILY_ACTIVITY?: ABNORMAL
SIT WITH YOUR KNEE BENT: ACTIVITY IS MINIMALLY DIFFICULT
WALK: ACTIVITY IS SOMEWHAT DIFFICULT

## 2019-09-23 NOTE — PROGRESS NOTES
[facility] Initial Evaluation  Subjective:  The history is provided by the patient.   Type of problem:  Left knee   Condition occurred with:  A fall/slip. This is a new condition   Problem details: Pain began about 1 month ago, pt tripped on the top step (12 inch step) .   Patient reports pain:  Anterior and posterior.  Associated symptoms:  Loss of motion/stiffness and loss of strength. Symptoms are exacerbated by bending/squatting, descending stairs, ascending stairs, sitting, transfers, standing and walking and relieved by heat, ice and NSAID's.    Where condition occurred: in the community.  and reported as 2/10 on pain scale. General health as reported by patient is good. Pertinent medical history includes:  Depression, menopausal, sleep disorder/apnea and overweight.    Surgeries include:  Orthopedic surgery (C3-7 lami).  Current medications:  Anti-depressants and anti-inflammatory.   Primary job tasks include:  Prolonged standing and prolonged sitting.  Pain is described as aching and is intermittent. Pain timing: no pattern. Since onset symptoms are gradually improving.      Patient is Retired.   Barriers include:  None as reported by patient.  Red flags:  None as reported by patient.                      Objective:  System    Ankle/Foot Evaluation  ROM:  AROM is normal.            PALPATION:   Left ankle tenderness present at:  gastroc/soleus                                               Hip Evaluation  HIP AROM:  AROM:    Left Hip:     Normal    Right Hip:   Normal                    Hip Strength:    Flexion:   Left: 5/5   Pain:  Right: 5/5   Pain:                    Extension:  Left: 4/5  Pain:Right: 5/5    Pain:    Abduction:  Left: 4/5     Pain:Right: 5/5    Pain:                  Hip Special Testing:    Left hip positive for the following special tests:  Clayton's             Knee Evaluation:  ROM:  AROM: normal              Ligament Testing:  Normal                Special Tests:    Left knee positive for the following special tests:  Patellar Compression and Clayton's  Left knee negative for the following special tests:  Meninscal and Patellar Tracking-Abduction Lateral    Palpation:    Left knee tenderness present at:  Medial Joint Line; Semitendinosus; Semembranosus; Gluteus Medius and Patellar Medial  Left knee tenderness not present at:  Lateral Joint Line    Edema:  Normal    Mobility Testing:      Patellofemoral Medial:  Left: hypomobile      Patellofemoral Lateral:  Left: hypomobile          Functional Testing:          Quad:    Single Leg Squat:  Left:      Right:        Bilateral Leg Squat:   Excessive anterior knee excursion and mild loss of control              General     ROS    Assessment/Plan:    Patient is a 74 year old female with left side knee complaints.    Patient has the following significant findings with corresponding treatment plan.                Diagnosis 1:  L knee pain  Pain -  hot/cold therapy, manual therapy, self management, education and home program  Decreased ROM/flexibility - manual therapy, therapeutic exercise and home program  Decreased strength - therapeutic exercise, therapeutic activities and home program    Therapy Evaluation Codes:   1) History comprised of:   Personal factors that impact the plan of care:      None.    Comorbidity factors that impact the plan of care are:      None.     Medications impacting care: None.  2) Examination of Body Systems comprised of:   Body structures and functions that impact the plan of care:      Ankle, Hip and Knee.   Activity limitations that impact the plan of care are:      Bathing, Squatting/kneeling, Stairs, Standing and Walking.  3) Clinical presentation characteristics are:   Stable/Uncomplicated.  4) Decision-Making    Low complexity using standardized patient assessment instrument and/or measureable assessment of functional outcome.  Cumulative Therapy Evaluation is: Low complexity.    Previous and current  functional limitations:  (See Goal Flow Sheet for this information)    Short term and Long term goals: (See Goal Flow Sheet for this information)     Communication ability:  Patient appears to be able to clearly communicate and understand verbal and written communication and follow directions correctly.  Treatment Explanation - The following has been discussed with the patient:   RX ordered/plan of care  Anticipated outcomes  Possible risks and side effects  This patient would benefit from PT intervention to resume normal activities.   Rehab potential is good.    Frequency:  1 X week, once daily  Duration:  for 8 weeks  Discharge Plan:  Achieve all LTG.  Independent in home treatment program.  Reach maximal therapeutic benefit.    Please refer to the daily flowsheet for treatment today, total treatment time and time spent performing 1:1 timed codes.

## 2019-09-30 ENCOUNTER — THERAPY VISIT (OUTPATIENT)
Dept: PHYSICAL THERAPY | Facility: CLINIC | Age: 74
End: 2019-09-30
Payer: COMMERCIAL

## 2019-09-30 DIAGNOSIS — M25.562 LEFT KNEE PAIN: ICD-10-CM

## 2019-09-30 PROCEDURE — 97110 THERAPEUTIC EXERCISES: CPT | Mod: GP | Performed by: PHYSICAL THERAPIST

## 2019-09-30 PROCEDURE — 97112 NEUROMUSCULAR REEDUCATION: CPT | Mod: GP | Performed by: PHYSICAL THERAPIST

## 2019-10-01 ENCOUNTER — HEALTH MAINTENANCE LETTER (OUTPATIENT)
Age: 74
End: 2019-10-01

## 2019-10-07 ENCOUNTER — THERAPY VISIT (OUTPATIENT)
Dept: PHYSICAL THERAPY | Facility: CLINIC | Age: 74
End: 2019-10-07
Payer: COMMERCIAL

## 2019-10-07 DIAGNOSIS — M25.562 LEFT KNEE PAIN: ICD-10-CM

## 2019-10-07 PROCEDURE — 97110 THERAPEUTIC EXERCISES: CPT | Mod: GP | Performed by: PHYSICAL THERAPIST

## 2019-10-07 PROCEDURE — 97112 NEUROMUSCULAR REEDUCATION: CPT | Mod: GP | Performed by: PHYSICAL THERAPIST

## 2019-10-14 ENCOUNTER — THERAPY VISIT (OUTPATIENT)
Dept: PHYSICAL THERAPY | Facility: CLINIC | Age: 74
End: 2019-10-14
Payer: COMMERCIAL

## 2019-10-14 DIAGNOSIS — M25.562 LEFT KNEE PAIN: ICD-10-CM

## 2019-10-14 PROCEDURE — 97112 NEUROMUSCULAR REEDUCATION: CPT | Mod: GP | Performed by: PHYSICAL THERAPIST

## 2019-10-14 PROCEDURE — 97110 THERAPEUTIC EXERCISES: CPT | Mod: GP | Performed by: PHYSICAL THERAPIST

## 2019-10-22 ENCOUNTER — TELEPHONE (OUTPATIENT)
Dept: GASTROENTEROLOGY | Facility: CLINIC | Age: 74
End: 2019-10-22

## 2019-10-22 DIAGNOSIS — Z80.0 FAMILY HISTORY OF COLON CANCER: Primary | ICD-10-CM

## 2019-10-22 NOTE — TELEPHONE ENCOUNTER
Patient scheduled for Colonoscopy    Indication for procedure. Family history of colon cancer    Referring Provider. Mely Reeves MD    ? No     Arrival time verified? 7 AM    Facility location verified? [address] [facility]    Instructions given regarding prep and procedure Patient declined review, has had exam before    Prep Type Golytely    Are you taking any anticoagulants or blood thinners? No     Instructions given? N/a     Electronic implanted devices? Denies     Pre procedure teaching completed? Yes    Transportation from procedure?  policy reviewed. Instructed patient to have someone stay with her for 6 hours post exam    H&P / Pre op physical completed? N/a

## 2019-10-28 ENCOUNTER — THERAPY VISIT (OUTPATIENT)
Dept: PHYSICAL THERAPY | Facility: CLINIC | Age: 74
End: 2019-10-28
Payer: COMMERCIAL

## 2019-10-28 DIAGNOSIS — M25.562 LEFT KNEE PAIN: ICD-10-CM

## 2019-10-28 PROCEDURE — 97140 MANUAL THERAPY 1/> REGIONS: CPT | Mod: GP | Performed by: PHYSICAL THERAPIST

## 2019-10-28 PROCEDURE — 97110 THERAPEUTIC EXERCISES: CPT | Mod: GP | Performed by: PHYSICAL THERAPIST

## 2019-10-29 ENCOUNTER — HOSPITAL ENCOUNTER (OUTPATIENT)
Facility: AMBULATORY SURGERY CENTER | Age: 74
End: 2019-10-29
Attending: INTERNAL MEDICINE
Payer: COMMERCIAL

## 2019-10-29 VITALS
DIASTOLIC BLOOD PRESSURE: 62 MMHG | HEIGHT: 67 IN | WEIGHT: 200 LBS | BODY MASS INDEX: 31.39 KG/M2 | HEART RATE: 63 BPM | RESPIRATION RATE: 14 BRPM | TEMPERATURE: 97.7 F | SYSTOLIC BLOOD PRESSURE: 120 MMHG | OXYGEN SATURATION: 97 %

## 2019-10-29 LAB — COLONOSCOPY: NORMAL

## 2019-10-29 RX ORDER — FENTANYL CITRATE 50 UG/ML
INJECTION, SOLUTION INTRAMUSCULAR; INTRAVENOUS PRN
Status: DISCONTINUED | OUTPATIENT
Start: 2019-10-29 | End: 2019-10-29 | Stop reason: HOSPADM

## 2019-10-29 RX ORDER — ONDANSETRON 2 MG/ML
4 INJECTION INTRAMUSCULAR; INTRAVENOUS EVERY 6 HOURS PRN
Status: DISCONTINUED | OUTPATIENT
Start: 2019-10-29 | End: 2019-10-30 | Stop reason: HOSPADM

## 2019-10-29 RX ORDER — ONDANSETRON 4 MG/1
4 TABLET, ORALLY DISINTEGRATING ORAL EVERY 6 HOURS PRN
Status: DISCONTINUED | OUTPATIENT
Start: 2019-10-29 | End: 2019-10-30 | Stop reason: HOSPADM

## 2019-10-29 RX ORDER — SIMETHICONE
LIQUID (ML) MISCELLANEOUS PRN
Status: DISCONTINUED | OUTPATIENT
Start: 2019-10-29 | End: 2019-10-29 | Stop reason: HOSPADM

## 2019-10-29 RX ORDER — FLUMAZENIL 0.1 MG/ML
0.2 INJECTION, SOLUTION INTRAVENOUS
Status: ACTIVE | OUTPATIENT
Start: 2019-10-29 | End: 2019-10-29

## 2019-10-29 RX ORDER — LIDOCAINE 40 MG/G
CREAM TOPICAL
Status: DISCONTINUED | OUTPATIENT
Start: 2019-10-29 | End: 2019-10-29 | Stop reason: HOSPADM

## 2019-10-29 RX ORDER — NALOXONE HYDROCHLORIDE 0.4 MG/ML
.1-.4 INJECTION, SOLUTION INTRAMUSCULAR; INTRAVENOUS; SUBCUTANEOUS
Status: DISCONTINUED | OUTPATIENT
Start: 2019-10-29 | End: 2019-10-30 | Stop reason: HOSPADM

## 2019-10-29 RX ORDER — ONDANSETRON 2 MG/ML
4 INJECTION INTRAMUSCULAR; INTRAVENOUS
Status: DISCONTINUED | OUTPATIENT
Start: 2019-10-29 | End: 2019-10-29 | Stop reason: HOSPADM

## 2019-10-29 ASSESSMENT — MIFFLIN-ST. JEOR: SCORE: 1444.82

## 2019-10-30 LAB — COPATH REPORT: NORMAL

## 2019-11-04 ENCOUNTER — THERAPY VISIT (OUTPATIENT)
Dept: PHYSICAL THERAPY | Facility: CLINIC | Age: 74
End: 2019-11-04
Payer: COMMERCIAL

## 2019-11-04 DIAGNOSIS — M25.562 LEFT KNEE PAIN: ICD-10-CM

## 2019-11-04 PROCEDURE — 97110 THERAPEUTIC EXERCISES: CPT | Mod: GP | Performed by: PHYSICAL THERAPIST

## 2019-11-04 PROCEDURE — 97140 MANUAL THERAPY 1/> REGIONS: CPT | Mod: GP | Performed by: PHYSICAL THERAPIST

## 2019-11-11 ENCOUNTER — THERAPY VISIT (OUTPATIENT)
Dept: PHYSICAL THERAPY | Facility: CLINIC | Age: 74
End: 2019-11-11
Payer: COMMERCIAL

## 2019-11-11 DIAGNOSIS — M25.562 LEFT KNEE PAIN: ICD-10-CM

## 2019-11-11 PROCEDURE — 97110 THERAPEUTIC EXERCISES: CPT | Mod: GP | Performed by: PHYSICAL THERAPIST

## 2019-11-11 PROCEDURE — 97112 NEUROMUSCULAR REEDUCATION: CPT | Mod: GP | Performed by: PHYSICAL THERAPIST

## 2019-11-25 ENCOUNTER — VIRTUAL VISIT (OUTPATIENT)
Dept: SLEEP MEDICINE | Facility: CLINIC | Age: 74
End: 2019-11-25
Payer: COMMERCIAL

## 2019-11-25 ENCOUNTER — THERAPY VISIT (OUTPATIENT)
Dept: PHYSICAL THERAPY | Facility: CLINIC | Age: 74
End: 2019-11-25
Payer: COMMERCIAL

## 2019-11-25 DIAGNOSIS — G47.33 OSA ON CPAP: Primary | ICD-10-CM

## 2019-11-25 DIAGNOSIS — M25.562 LEFT KNEE PAIN: ICD-10-CM

## 2019-11-25 PROCEDURE — 99442 ZZC PHYSICIAN TELEPHONE EVALUATION 11-20 MIN: CPT | Performed by: INTERNAL MEDICINE

## 2019-11-25 PROCEDURE — 97110 THERAPEUTIC EXERCISES: CPT | Mod: GP | Performed by: PHYSICAL THERAPIST

## 2019-11-25 ASSESSMENT — ACTIVITIES OF DAILY LIVING (ADL)
LIMPING: I DO NOT HAVE THE SYMPTOM
STIFFNESS: I DO NOT HAVE THE SYMPTOM
GO DOWN STAIRS: ACTIVITY IS NOT DIFFICULT
SIT WITH YOUR KNEE BENT: ACTIVITY IS NOT DIFFICULT
SQUAT: ACTIVITY IS MINIMALLY DIFFICULT
STAND: ACTIVITY IS NOT DIFFICULT
PAIN: I DO NOT HAVE THE SYMPTOM
RISE FROM A CHAIR: ACTIVITY IS NOT DIFFICULT
HOW_WOULD_YOU_RATE_THE_CURRENT_FUNCTION_OF_YOUR_KNEE_DURING_YOUR_USUAL_DAILY_ACTIVITIES_ON_A_SCALE_FROM_0_TO_100_WITH_100_BEING_YOUR_LEVEL_OF_KNEE_FUNCTION_PRIOR_TO_YOUR_INJURY_AND_0_BEING_THE_INABILITY_TO_PERFORM_ANY_OF_YOUR_USUAL_DAILY_ACTIVITIES?: 95
WEAKNESS: I DO NOT HAVE THE SYMPTOM
KNEEL ON THE FRONT OF YOUR KNEE: NOT ANSWERED
GO UP STAIRS: ACTIVITY IS NOT DIFFICULT
GIVING WAY, BUCKLING OR SHIFTING OF KNEE: I DO NOT HAVE THE SYMPTOM
AS_A_RESULT_OF_YOUR_KNEE_INJURY,_HOW_WOULD_YOU_RATE_YOUR_CURRENT_LEVEL_OF_DAILY_ACTIVITY?: NEARLY NORMAL
WALK: ACTIVITY IS NOT DIFFICULT
SWELLING: NOT ANSWERED
HOW_WOULD_YOU_RATE_THE_OVERALL_FUNCTION_OF_YOUR_KNEE_DURING_YOUR_USUAL_DAILY_ACTIVITIES?: NEARLY NORMAL
KNEE_ACTIVITY_OF_DAILY_LIVING_SUM: 59

## 2019-11-25 NOTE — PROGRESS NOTES
Pt does not have modem and will need to have cpap downloaded before visit can be completed.  Pt has been asked to have cpap downloaded and faxed to clinic.  Once rev'd pt will be called to discuss.    BRIDGETTE Burns

## 2019-11-26 ENCOUNTER — TELEPHONE (OUTPATIENT)
Dept: SLEEP MEDICINE | Facility: CLINIC | Age: 74
End: 2019-11-26

## 2019-11-26 NOTE — TELEPHONE ENCOUNTER
Pt walked in clinic today for cpap download related to her telephone visit 11/25.  Dr. Kruger has reviewed download with pt and setting have been changed.      Cpap pressure changed from original settings of 7 Min - 10 Max to new settings of 9 Min - 12 Max.      BRIDGETTE Burns

## 2019-11-27 NOTE — PROGRESS NOTES
"Sleep clinic virtual visit note    Date of virtual visit: 11/25/2019    Purpose of visit: f/u HANNA, Review CPAP compliance     HPI:  Toshia Caruso is a 75 yo F with h/o  HANNA on CPAP, HTN, dysthymia.  Virtual visit  today is for follow-up of HANNA and  review CPAP compliance.  PSG was performed on 11/29/2015 with an AHI of 17/hr. Last visit was 7/21/2017.  During her last sleep clinic viist dated 9.10.19, the compliance DL showed 100% compliance. AHI was 7.2. Given weight gain and report of snoring despite use, the pressure settings on her CPAP  were  converted to auto CPAP with range 7 - 10 cm H2O.  She reports using the device regularly during sleep. Reports good tolerance to the  pressure settings.   Her snoring has improved since last clinic visit. She denies fatigue/EDS.   Compliance  data was reviewed.    ASSESSMENT/PLAN:  Moderate HANNA. HANNA: Pt reports adequate compliance with CPAP. Recommended increasing the minimum  pressure settings =9 and max pressure settings =12 cms of water.  Recommended her to continue using the CPAP regularly during sleep and getting the supplies for the CPAP replaced regularly and instructed to let me know if she has trouble tolerating the revised pressures.    Patient was counseled on avoiding driving if drowsy or sleepy.  She was instructed to stop at sleep clinic in the next month to get the data  card DL. If the DL showed improvement in AHI, the plan will be for her to  F/U in 1 year or  sooner if there are any concerns.     The above note was dictated using voice recognition software. Although reviewed after completion, some word and grammatical error may remain . Please contact the author for any clarifications.    \"I spent a total of 15  with Toshia Caruso during today's virtual visit. Over 50% of this time was spent counseling the patient and  coordinating care regarding HANNA, CPAP treatment .\"     Breana Resendiz MD   of Medicine,  Division of " Pulmonary/Sleep Medicine  [facility].

## 2019-12-15 ENCOUNTER — HEALTH MAINTENANCE LETTER (OUTPATIENT)
Age: 74
End: 2019-12-15

## 2020-03-17 PROBLEM — M25.562 LEFT KNEE PAIN: Status: RESOLVED | Noted: 2019-09-23 | Resolved: 2020-03-17

## 2020-03-17 NOTE — PROGRESS NOTES
"Subjective:  HPI  Physical Exam                    Objective:  System    Physical Exam    General     ROS    Assessment/Plan:    DISCHARGE REPORT    Progress reporting period is from 9/23/19 to 11/23/19.     SUBJECTIVE  Subjective: Continues to improve. Knee hurts the most in the morning \"feels stiff\". Loosens up in about 3 min.    Current Pain level: 1/10   Initial Pain level: 7/10   Changes in function: Yes, see goal flow sheet for change in function   Adverse reactions: None;   ,     Patient has failed to return to therapy so current objective findings are unknown.  The subjective and objective information are from the last SOAP note on this patient.    OBJECTIVE  Objective: Hip ext strength 4+/5 on left      ASSESSMENT/PLAN  Diagnosis 1:  L knee pain  Pain -  hot/cold therapy, manual therapy, self management, education and home program  Decreased ROM/flexibility - manual therapy, therapeutic exercise and home program  Decreased strength - therapeutic exercise, therapeutic activities and home program  STG/LTGs have been met or progress has been made towards goals:  Yes (See Goal flow sheet completed today.)  Assessment of Progress: The patient has not returned to therapy. Current status is unknown.  Self Management Plans:  Patient has been instructed in a home treatment program.  Patient  has been instructed in self management of symptoms.  Toshia continues to require the following intervention to meet STG and LTG's: PT intervention is no longer required to meet STG/LTG.  The patient failed to complete scheduled/ordered appointments so current information is unknown.  We will discharge this patient from PT.    Recommendations:  This patient is ready to be discharged from therapy and continue their home treatment program.    Please refer to the daily flowsheet for treatment today, total treatment time and time spent performing 1:1 timed codes.    "

## 2020-09-10 VITALS — BODY MASS INDEX: 32.18 KG/M2 | WEIGHT: 205 LBS | HEIGHT: 67 IN

## 2020-09-10 RX ORDER — LORATADINE 10 MG/1
10 TABLET ORAL PRN
COMMUNITY

## 2020-09-10 ASSESSMENT — MIFFLIN-ST. JEOR: SCORE: 1462.5

## 2020-09-10 NOTE — PROGRESS NOTES
"Toshia Caruso is a 75 year old female who is being evaluated via a billable video visit.      The patient has been notified of following:     \"This video visit will be conducted via a call between you and your physician/provider. We have found that certain health care needs can be provided without the need for an in-person physical exam.  This service lets us provide the care you need with a video conversation.  If a prescription is necessary we can send it directly to your pharmacy.  If lab work is needed we can place an order for that and you can then stop by our lab to have the test done at a later time.    Video visits are billed at different rates depending on your insurance coverage.  Please reach out to your insurance provider with any questions.    If during the course of the call the physician/provider feels a video visit is not appropriate, you will not be charged for this service.\"    Patient has given verbal consent for Video visit? Yes  How would you like to obtain your AVS? MyChart  If you are dropped from the video visit, the video invite should be resent to: Send to e-mail at: [email or web address]  Will anyone else be joining your video visit? No        Video-Visit Details    Type of service:  Video Visit    Video Start Time: 1:03 PM  Video End Time: 1:16 PM    Originating Location (pt. Location): Home    Distant Location (provider location):  [facility]     Platform used for Video Visit: Gisela Weiner MD    Chief complaint: Follow-up of obstructive sleep apnea needs updated order    History of Present Illness: 75-year-old female with history of hypertension, obesity, at least moderately severe obstructive sleep apnea.  She reports ongoing good benefit from the use of CPAP.  She gets her supplies from [employer].  She cleans her supplies but also uses a so clean commercially available cleaning device.  She uses nasal pillows.  She feels that the pressure is " adequate.  In fact, it actually helps when she is experiencing allergies to help her breathe at night.  She tends to experience fall allergies.  She has occasional sleepless nights but there is no pattern and she is not particularly bothered by this.  She drinks 2 mugs of coffee in the morning which she thinks is the equivalent of about 4 cups.  She denies problems with sleepwalking, sleep talking, restless legs or dream enactment behavior.  She rarely drinks any alcohol and does not take any prescribed sleep aids.  On occasion she will take acetaminophen or ibuprofen with diphenhydramine before bed particularly after gardening a lot due to aches and pains.  She thinks she is gained about 10 pounds since her sleep study.    Total score - [place]: 6 (9/9/2020 10:47 AM) (Less than 10 normal)    TEJ Total Score: 7 (normal 0-7, mild 8-14, moderate 15-21, severe 22-28)    Past Medical History:   Diagnosis Date     Dysthymic disorder 8/4/2011     Obstructive sleep apnea 9/18/2012     Unspecified essential hypertension 9/13/2011       Allergies   Allergen Reactions     Cats Itching     Seasonal Allergies      Eyes, throat sinus     Smoke. Itching     Watery eyes       Current Outpatient Medications   Medication     B Complex-C (SUPER B COMPLEX/VITAMIN C PO)     cetirizine (KLS ALLER-SHERLYN) 10 MG tablet     citalopram (CELEXA) 40 MG tablet     diphenhydrAMINE-APAP, sleep, (TYLENOL PM EXTRA STRENGTH PO)     folic acid (FOLVITE) 400 MCG tablet     Glucosamine-Chondroit-Vit C-Mn (GLUCOSAMINE CHONDROITIN COMPLX) TABS     loratadine (CLARITIN) 10 MG tablet     Multiple Vitamin (MULTIVITAMINS) CAPS     NAPROXEN PO     Nutritional Supplements (ESTROVEN) TABS     Omega-3 Fatty Acids (OMEGA 3 PO)     Probiotic Product (SOLUBLE FIBER/PROBIOTICS PO)     VITAMIN D, CHOLECALCIFEROL, PO     No current facility-administered medications for this visit.        Social History     Socioeconomic History     Marital status:      Spouse  name: Not on file     Number of children: Not on file     Years of education: Not on file     Highest education level: Not on file   Occupational History     Not on file   Social Needs     Financial resource strain: Not on file     Food insecurity     Worry: Not on file     Inability: Not on file     Transportation needs     Medical: Not on file     Non-medical: Not on file   Tobacco Use     Smoking status: Never Smoker     Smokeless tobacco: Never Used   Substance and Sexual Activity     Alcohol use: Yes     Comment: rare     Drug use: No     Sexual activity: Yes     Partners: Male   Lifestyle     Physical activity     Days per week: Not on file     Minutes per session: Not on file     Stress: Not on file   Relationships     Social connections     Talks on phone: Not on file     Gets together: Not on file     Attends Taoist service: Not on file     Active member of club or organization: Not on file     Attends meetings of clubs or organizations: Not on file     Relationship status: Not on file     Intimate partner violence     Fear of current or ex partner: Not on file     Emotionally abused: Not on file     Physically abused: Not on file     Forced sexual activity: Not on file   Other Topics Concern     Parent/sibling w/ CABG, MI or angioplasty before 65F 55M? Not Asked      Service Not Asked     Blood Transfusions Not Asked     Caffeine Concern Not Asked     Occupational Exposure Not Asked     Hobby Hazards Not Asked     Sleep Concern Not Asked     Stress Concern Not Asked     Weight Concern Not Asked     Special Diet Not Asked     Back Care Not Asked     Exercise Yes     Comment: goes to a gym and does strength 2/wk and cardiovascular 3/wk     Bike Helmet Not Asked     Seat Belt Not Asked     Self-Exams Not Asked   Social History Narrative     Not on file       Family History   Problem Relation Age of Onset     Diabetes Mother      Thyroid Disease Mother 60        treated with radioactive iodine      "Diabetes Father      C.A.D. Father         silent heart attacks     Glaucoma Father      Colon Cancer Father 70        treated with surgery, 6\" colon removed     Diabetes Maternal Grandfather      Thyroid Disease Sister 30         EXAM:  Ht 1.702 m (5' 7\")   Wt 93 kg (205 lb)   BMI 32.11 kg/m    GENERAL: Healthy, alert and no distress  EYES: Eyes grossly normal to inspection.  No discharge or erythema, or obvious scleral/conjunctival abnormalities.  RESP: No audible wheeze, cough, or visible cyanosis.  No visible retractions or increased work of breathing.    SKIN: Visible skin clear. No significant rash, abnormal pigmentation or lesions.  NEURO: Cranial nerves grossly intact.  Mentation and speech appropriate for age.  PSYCH: Mentation appears normal, affect normal/bright, judgement and insight intact, normal speech and appearance well-groomed.    PSG [facility] 11/29/2015  Weight 204  lbs BMI 31  AHI 17, RDI 59, No REM on diagnostic Lowest O2 SAT 89%    PAP download from 8/10/2020 to 9/8/2020 reviewed:  Per cent of days used greater than 4 Hours 100% (minimum goal greater than 70%)  Average use on days used: 8  hours 17 min  Settings: Min EPAP 9 cmH2O    Max EPAP 12 cmH2O  Pressures delivered 90/95th percentile for pressure 11.9 cmH2O  Average AHI 4.7 events per hour (goal less than 5)  Leak acceptable    ASSESSMENT:  75-year-old female with history of at least moderate obstructive sleep apnea, obesity, hypertension.  She is getting excellent clinical benefit meeting compliance goals with optimally treated sleep apnea.    PLAN:  We will generate a prescription to keep her supplies up-to-date.  Recommended that she watch her weight and avoid weight gain.  Counseled her about the importance of reminding providers of her diagnosis should she go through any procedures requiring sedation, or anesthesia.  She should take her device with her should she get hospitalized or go to any procedures.  Please see " patient instructions for further details of counseling provided.  She is agreeable with this plan and will follow-up in 1 year.        Sammie Weiner M.D.  Pulmonary/Critical Care/Sleep Medicine    [facility]   [address], [address]   [address] [place] [address]   Appointments: [phone]    The above note was dictated using voice recognition software and may include typographical errors. Please contact the author for any clarifications.

## 2020-09-10 NOTE — PATIENT INSTRUCTIONS
For general sleep health questions: http://sleepeducation.org    For tips about PAP and COVID -19:  https://www.thoracic.org/patients/patient-resources/resources/covid-19-and-home-pap-therapy.pdf        Continue PAP therapy every night, for all hours that you are sleeping.  If you nap use CPAP.  As always, try to get at least 8 hours of sleep or more each day, keep a regular sleep schedule, and avoid sleep deprivation. Avoid alcohol.    Reasons that you might need a change to your pressure therapy would be weight gain or loss, waking having inadvertently removed your PAP overnight, having previously felt refreshed by sleep with CPAP use and now waking un-refreshed, and return of daytime sleepiness. Also, the development of new medical problems  (such as heart failure, stroke, medications such as narcotics) can sometimes affect breathing at night and change your PAP therapy needs.    Please bring PAP with you if you are hospitalized.  If anticipating surgery be sure to discuss with your surgeon that you have sleep apnea and use PAP therapy.      Maintain your equipment as recommended which includes routine cleaning and replacement of supplies.      Call DME for any questions regarding supplies or maintenance.  Your DME is [facility]      Do not drive on engage in potentially dangerous activities if feeling sleepy.    Please follow up in sleep clinic again in 12 months and bring your machine and card to your follow-up visit.          Tips for your PAP use-    Mask fitting tips  Mask fitting exercise:    To improve your mask seal and your mobility at night, put mask on and secure in place.  Lie down in bed with full pressure and roll to one side, adjust headgear while in that position to eliminate any leaks. Repeat process rolling to other side.     The mask seal does not have to be perfect:   CPAP machines are designed to make up for small leaks. However, you will not tolerate leaks blowing in your eyes so you will need to  adjust.   Any leak should only be near or at the bottom of the mask.  We expect your mask to leak slightly at night.    Do not over-tighten the headgear straps, tighter IS NOT better, we expect minimal leak.    First try re-positioning the mask or headgear before tightening the headgear straps.  Mask leaks are expected due to changing sleeping positions. Try pulling the mask away from your skin allowing the cushion to re-inflate will minimize the leak.  If you struggle for a good fit, try turning the CPAP off and then readjust the mask by pulling it away from your face and then turning back on the CPAP.        Humidifier tips  Humidifiers can be adjusted to increase or decrease the amount of moisture according to your comfort level. You may need to adjust this frequently at first, but then might only change it with seasonal weather changes.     Try INCREASING the humidity if:  You experience a dry, irritated nasal passage or throat.  You have a runny, drippy nose or sneezing fits after using CPAP.  You experience nasal congestion during or after CPAP use.    Try DECREASING the humidity if:  You have excessive condensation or  rain out  in the tubing or mask.  Otherwise keep the tubing warm during the night by running it underneath the blankets or pillow.      Clinic visit after initial PAP set-up   Bring your equipment with you to your 4 week follow up clinic visit.  We will be extracting your data from the machine.        Travel  Always take your equipment with you.  If you fly with your equipment bring it on with you as a carry on.  Medical equipment does not count as a carry on.    If you travel international the machines take 110-240.  The only adapter needed is the adapter that will fit into the receptacle (outlet).    You may also want to bring an extension cord as many hotel rooms have limited outlets at the bedside.  Do not travel with water in your humidifier chamber.     Cleaning and Maintenance  Guidelines    Equipment Frequency Cleaning Method   Mask First Day    Daily      Weekly Soak mask in hot soapy water for 30 minutes, rinse and air dry.  Wipe nasal cushion with a hot soapy (Ivory, baby shampoo) cloth and rinse.  Baby wipes may also be used.  Do not use anti-bacterial soaps,Daysi  liquid soap, rubbing alcohol, bleach or ammonia.  Wash frame in hot soapy water (Ivory, baby shampoo) rinse and let air dry   Headgear Biweekly Wash in hot soapy water, rinse and air dry   Reusable Gray Filter Weekly Wash in hot soapy water, rinse, put in towel squeeze moisture out, let air dry   Disposable White Filter Check Weekly Replace when brown or gray in color; at least every 2 to 3 months   Humidifier Chamber Daily    Weekly Empty distilled water from humidifier and let air dry    Hand wash in hot soapy water, rinse and air dry   Tubing Weekly Wash in hot soapy water, rinse and let air dry   Mask, Tubing and Humidifier Chamber As needed Disinfect: Soak in 1 part distilled white vinegar to 3 parts hot water for 30 minutes, rinse well and air dry  Not the material headgear        MASK AND SUPPLY REORDERING and EQUIPMENT NEEDS through your DME and per your insurance  Reminder: Most insurance companies will allow for a new mask, headgear, tubing, and reusable gray filter every six months.  Disposable white ultra-fine filters are covered monthly.      HOME AND SAFETY INSTRUCTIONS    Do not use frayed or cracked electrical cords, multi plug adaptors, or switched receptacles    Do not immerse electrical equipment into water    Assure that electrical cords do not become a tripping hazard      Your BMI is Body mass index is 32.11 kg/m .  Weight management is a personal decision.  If you are interested in exploring weight loss strategies, the following discussion covers the approaches that may be successful. Body mass index (BMI) is one way to tell whether you are at a healthy weight, overweight, or obese. It measures your  weight in relation to your height.  A BMI of 18.5 to 24.9 is in the healthy range. A person with a BMI of 25 to 29.9 is considered overweight, and someone with a BMI of 30 or greater is considered obese. More than two-thirds of American adults are considered overweight or obese.  Being overweight or obese increases the risk for further weight gain. Excess weight may lead to heart disease and diabetes.  Creating and following plans for healthy eating and physical activity may help you improve your health.  Weight control is part of healthy lifestyle and includes exercise, emotional health, and healthy eating habits. Careful eating habits lifelong are the mainstay of weight control. Though there are significant health benefits from weight loss, long-term weight loss with diet alone may be very difficult to achieve- studies show long-term success with dietary management in less than 10% of people. Attaining a healthy weight may be especially difficult to achieve in those with severe obesity. In some cases, medications, devices and surgical management might be considered.  What can you do?  If you are overweight or obese and are interested in methods for weight loss, you should discuss this with your provider.     Consider reducing daily calorie intake by 500 calories.     Keep a food journal.     Avoiding skipping meals, consider cutting portions instead.    Diet combined with exercise helps maintain muscle while optimizing fat loss. Strength training is particularly important for building and maintaining muscle mass. Exercise helps reduce stress, increase energy, and improves fitness. Increasing exercise without diet control, however, may not burn enough calories to loose weight.       Start walking three days a week 10-20 minutes at a time    Work towards walking thirty minutes five days a week     Eventually, increase the speed of your walking for 1-2 minutes at time    In addition, we recommend that you review  healthy lifestyles and methods for weight loss available through the National Institutes of Health patient information sites:  http://win.niddk.nih.gov/publications/index.htm    And look into health and wellness programs that may be available through your health insurance provider, employer, local community center, or danette club.    Weight management plan: Discussed healthy diet and exercise guidelines

## 2020-09-11 ENCOUNTER — VIRTUAL VISIT (OUTPATIENT)
Dept: SLEEP MEDICINE | Facility: CLINIC | Age: 75
End: 2020-09-11
Payer: COMMERCIAL

## 2020-09-11 DIAGNOSIS — E66.09 CLASS 1 OBESITY DUE TO EXCESS CALORIES WITH SERIOUS COMORBIDITY AND BODY MASS INDEX (BMI) OF 32.0 TO 32.9 IN ADULT: ICD-10-CM

## 2020-09-11 DIAGNOSIS — G47.33 OBSTRUCTIVE SLEEP APNEA: ICD-10-CM

## 2020-09-11 DIAGNOSIS — E66.811 CLASS 1 OBESITY DUE TO EXCESS CALORIES WITH SERIOUS COMORBIDITY AND BODY MASS INDEX (BMI) OF 32.0 TO 32.9 IN ADULT: ICD-10-CM

## 2020-09-11 PROCEDURE — 99213 OFFICE O/P EST LOW 20 MIN: CPT | Mod: 95 | Performed by: INTERNAL MEDICINE

## 2020-09-23 NOTE — NURSING NOTE
DME order faxed to [facility].  1 year follow up post card mailed.    Andrei Luna  Sleep Clinic Specialist - [place]

## 2020-09-30 DIAGNOSIS — F34.1 DYSTHYMIC DISORDER: ICD-10-CM

## 2020-10-01 ENCOUNTER — MYC MEDICAL ADVICE (OUTPATIENT)
Dept: INTERNAL MEDICINE | Facility: CLINIC | Age: 75
End: 2020-10-01

## 2020-10-01 DIAGNOSIS — F34.1 DYSTHYMIC DISORDER: ICD-10-CM

## 2020-10-02 RX ORDER — CITALOPRAM HYDROBROMIDE 40 MG/1
40 TABLET ORAL DAILY
Qty: 90 TABLET | Refills: 0 | Status: SHIPPED | OUTPATIENT
Start: 2020-10-02 | End: 2020-12-30

## 2020-10-02 RX ORDER — CITALOPRAM HYDROBROMIDE 40 MG/1
40 TABLET ORAL DAILY
Qty: 90 TABLET | Status: CANCELLED | OUTPATIENT
Start: 2020-10-02

## 2020-10-02 NOTE — TELEPHONE ENCOUNTER
FOLLOW-UP NOTE:          MEDICAL NECESSITY:    Alexa Rucker is a 65 year old female  known to my pain management clinic who suffers from Lumbar pain.    Interval History:  Adverse Effects to Meds: denies  Aberrant Behavior:  denies  Analgesia/ADLS: The patient states that performing household chores aggravates their pain.       The quality of pain is described as aching.  The pain's intensity at its maximum is 8.  On average 3, and today pain is a 3.  Worsened by standing and sitting.  Alleviated by rest and lying.  The patient denies fevers, chills, bowel or bladder dysfunction.      REVIEW OF SYSTEMS:  As above  Denies fevers or chills  Denies cough or cold  Denies nausea or vomiting  Positive bowel or bladder disturbances  Negative for numbness/ tingling in the lower extremity  Negative for lower extremity weakness      This above part was completed by Sangeetha Hernandez MA and reviewed by Dr Tramaine CHAKRABORTY helped  Standing or sitting for more than 30min may increase pain  lbp into legs improved.       Current Outpatient Medications   Medication Sig Dispense Refill   • gabapentin (NEURONTIN) 800 MG tablet Take 1 tablet by mouth 3 times daily. 90 tablet 2   • celecoxib (CELEBREX) 200 MG capsule Take 1 capsule by mouth daily. With food 30 capsule 3   • levothyroxine (SYNTHROID, LEVOTHROID) 50 MCG tablet Take 1 tablet by mouth daily. 90 tablet 1   • atenolol (TENORMIN) 25 MG tablet TAKE 1 TABLET BY MOUTH ONCE DAILY 90 tablet 0   • DULoxetine (CYMBALTA) 30 MG capsule Take 1 capsule by mouth 2 times daily. 30 capsule 2   • fluticasone (FLONASE) 50 MCG/ACT nasal spray Spray 2 sprays in each nostril daily. 16 g 1   • hydroxychloroquine (PLAQUENIL) 200 MG tablet Take 1 tablet by mouth 2 times daily. 60 tablet 8   • albuterol 108 (90 Base) MCG/ACT inhaler Inhale 2 puffs into the lungs every 4 hours as needed for Shortness of Breath or Wheezing. 8.5 g 3   • baclofen (LIORESAL) 10 MG tablet Take 1 tablet by mouth 2 times  citalopram (CELEXA) 40 MG tablet    Last Written Prescription Date:  8/14/2019  Last Fill Quantity: 90,   # refills: 3  Last Office Visit : 9/19/2019  Future Office visit:  None    Routing refill request to provider for review/approval because:  Notification stating dose exceeds maximum daily amount refer to clinic for review.  Also would Provider like an updated office visit and PHQ-9 on file for Pt care.       Meera Cuba RN  Central Triage Red Flags/Med Refills     daily as needed (for muscle spasm). Patient is to take one capsule at night only for 4-5 days.  Then take twice a day if tolerated, if not tolerated should go back to taking once a day qhs only. When taking this medication during the day , take only half first to see how you tolerate the medication. 60 tablet 2   • UNKNOWN TO PATIENT She takes vitamins from [employer] and essential oils       No current facility-administered medications for this encounter.        Diagnostically      MRI LUMBAR SPINE:  Results for orders placed during the hospital encounter of 08/13/18   MRI Lumbar Spine    Narrative PROCEDURE: LUMBAR SPINE MRI WITHOUT CONTRAST    COMPARISONS: 03/28/2014    CLINICAL INDICATION: Back pain    TECHNIQUE: Noncontrast lumbosacral spine MRI study was performed with  sagittal T1, T2, STIR and axial T1- and T2-weighted sequences.    FINDINGS: Mild scoliosis. The conus medullaris is positioned normally at  L1. No abnormal signal within the visualized cord.    Incidental L1 hemangiomas. Small perineural cysts within the distal sacral  canal.    L1-L2: No canal or foraminal narrowing.    L2-L3: Minimal disc bulging. No canal or foraminal narrowing.    L3-L4: Ligamentum flavum and facet hypertrophy. No canal or foraminal  narrowing.    L4-L5: Spinal canal is minimally narrowed by circumferential disc bulging  and ligamentum flavum and facet hypertrophy. No significant neural  foraminal narrowing.    L5-S1: Mild ligamentum flavum and facet hypertrophy and mild  circumferential disc bulging. No canal or foraminal narrowing.        Impression IMPRESSION: Mild degenerative disc disease and degenerative facet  hypertrophy. No significant canal or foraminal narrowing.             CT LUMBAR SPINE:  No results found for this or any previous visit.    CT THORACIC SPINE:  No results found for this or any previous visit.            PHYSICAL EXAM:  Well-appearing well-nourished, pleasant   Anicteric   Chest: no retractions,  good inspiratory effort   Neck supple without JVD  Neuro: able to raise legs at the hip, sensation intact in lower extremities when eyes closed  MSK: negative seated SLR for back pain, post seated SLR for radicular pain   Seated Ky's is neg on the b/l  Visually Gait is antalgic  Heal walk pos   Toe walk pos       Assessment and plan  Lumbar  Degenerative Disc Disease  Lumbar Spondylosis  Lumbar radiculitis, improved  Based on Imaging and ongoing symptoms next step is f/u  In 3months.  Downward titrate of of gabapentin.    New Prescriptions    No medications on file       No orders of the defined types were placed in this encounter.      More than half of that time was spent with counseling, coordinating of care, reviewing pertinent imaging, and answering the patient's questions.  All risks/benefits/alternatives of the above were explained in detail to the patient.  These were all explained in lay person's terms and included infection, bleeding, headache, increase in pain, not working, and potential paralysis.  All of the patient's questions were answered.  I have further provided the patient literature on the above.  Please do not hesitate to call my  interventional pain management clinic should you have any further questions.  Thank you for the consult and I look forward to helping your patient increase their function and resume their activities of daily living.                                                   This visit was performed via live, interactive two-way video through [facility] Medical Record System and Patient Portal.  15min.  The patient's location is home.  This visit was done in this fashion due to the COVID Health Crisis.

## 2020-12-21 DIAGNOSIS — F34.1 DYSTHYMIC DISORDER: ICD-10-CM

## 2020-12-29 NOTE — PROGRESS NOTES
Virtual visit    CC:  Follow up    Other health care team members:  Sleep Medicine:  Dr. Weiner    HPI:    75 year old female pmhx hyperlipidemia (declined rx), dysthymic disorder, hypertension, HANNA, obesity BMI 32, tubular adenoma of colon, family hx colon cancer, last colonoscopy 10/29/19, left knee pain, dark stools in the past, felt to be secondary to NSAID use.    Today reports that since September, she has had a runny nose and a cough.  Taking vitamin C which helps, but symptoms resume if doesn't take it.  Feels like mucus is going down the back of her throat, but no .  Decongestant helps as well.  Does not think it is allergies.  Symptoms come on suddenly, no clear trigger including.  Wakes her up at night some times as.  No down feather pillows/blankets, no pets in room, cold air, perfumes, smoke, exhaust, detergents-none seem to be triggers.  No travels prior to symptom onset.  No tobacco use history.  Daughter had daughter in November, subsequent testing negative. Kept distance from her after testing negative. Hx of [place] flu three times in the past, and is trying to be very careful about exposure to COVID. Offered to prescribe nasal steroid and she is open to that.  I let her know that she needs to use it daily in order to be effecting.  Has CPAP (covers only nose), and is waking up with dry mouth. Has had one that covered nose and mouth, but it extended to below her lips and it shifted her teeth.  Has chin strap.  Advised humidifying air and using artificial saliva if that is not      Most recent labs and colonoscopy reviewed.  (see below)  Last mammogram 7/15/19 negative  DXA 12/2/13 normal  Due for Shingrix, ?flu, up to date with Pneumo    Current Outpatient Medications   Medication Sig Dispense Refill     B Complex-C (SUPER B COMPLEX/VITAMIN C PO)        cetirizine (KLS ALLER-SHERLYN) 10 MG tablet        citalopram (CELEXA) 40 MG tablet Take 1 tablet (40 mg) by mouth daily 90 tablet 0      "diphenhydrAMINE-APAP, sleep, (TYLENOL PM EXTRA STRENGTH PO) Take by mouth as needed       folic acid (FOLVITE) 400 MCG tablet daily       Glucosamine-Chondroit-Vit C-Mn (GLUCOSAMINE CHONDROITIN COMPLX) TABS Take  by mouth. Occassionally       loratadine (CLARITIN) 10 MG tablet Take 10 mg by mouth as needed for allergies       Multiple Vitamin (MULTIVITAMINS) CAPS Take 1 tablet by mouth daily Vipul red       NAPROXEN PO Take by mouth as needed for moderate pain       Nutritional Supplements (ESTROVEN) TABS Take 1 tablet by mouth daily.       Omega-3 Fatty Acids (OMEGA 3 PO)        Probiotic Product (SOLUBLE FIBER/PROBIOTICS PO) allign       VITAMIN D, CHOLECALCIFEROL, PO Take 1 tablet by mouth daily.       Allergies   Allergen Reactions     Cats Itching     Seasonal Allergies      Eyes, throat sinus     Smoke. Itching     Watery eyes     Exam:  BP Readings from Last 6 Encounters:   10/29/19 120/62   09/19/19 114/76   09/10/19 139/82   08/14/19 121/77   06/02/18 108/72   07/21/17 107/76     Wt Readings from Last 5 Encounters:   09/10/20 93 kg (205 lb)   10/29/19 90.7 kg (200 lb)   09/19/19 96.7 kg (213 lb 3.2 oz)   09/10/19 96.6 kg (213 lb)   08/14/19 95.7 kg (211 lb)     Estimated body mass index is 32.11 kg/m  as calculated from the following:    Height as of 9/10/20: 1.702 m (5' 7\").    Weight as of 9/10/20: 93 kg (205 lb).      Component      Latest Ref Rng & Units 8/14/2019 9/19/2019   WBC      4.0 - 11.0 10e9/L 5.2    RBC Count      3.8 - 5.2 10e12/L 4.57    Hemoglobin      11.7 - 15.7 g/dL 14.7    Hematocrit      35.0 - 47.0 % 44.8    MCV      78 - 100 fl 98    MCH      26.5 - 33.0 pg 32.2    MCHC      31.5 - 36.5 g/dL 32.8    RDW      10.0 - 15.0 % 12.8    Platelet Count      150 - 450 10e9/L 292    Diff Method       Automated Method    % Neutrophils      % 62.2    % Lymphocytes      % 26.3    % Monocytes      % 6.0    % Eosinophils      % 4.1    % Basophils      % 0.6    % Immature Granulocytes      % 0.8  "   Nucleated RBCs      0 /100 0    Absolute Neutrophil      1.6 - 8.3 10e9/L 3.2    Absolute Lymphocytes      0.8 - 5.3 10e9/L 1.4    Absolute Monocytes      0.0 - 1.3 10e9/L 0.3    Absolute Eosinophils      0.0 - 0.7 10e9/L 0.2    Absolute Basophils      0.0 - 0.2 10e9/L 0.0    Abs Immature Granulocytes      0 - 0.4 10e9/L 0.0    Absolute Nucleated RBC       0.0    Sodium      133 - 144 mmol/L 141    Potassium      3.4 - 5.3 mmol/L 4.5    Chloride      94 - 109 mmol/L 107    Carbon Dioxide      20 - 32 mmol/L 30    Anion Gap      3 - 14 mmol/L 4    Glucose      70 - 99 mg/dL 98    Urea Nitrogen      7 - 30 mg/dL 14    Creatinine      0.52 - 1.04 mg/dL 0.80    GFR Estimate      >60 mL/min/1.73:m2 73    GFR Estimate If Black      >60 mL/min/1.73:m2 85    Calcium      8.5 - 10.1 mg/dL 8.7    Bilirubin Direct      0.0 - 0.2 mg/dL 0.2    Bilirubin Total      0.2 - 1.3 mg/dL 0.5    Albumin      3.4 - 5.0 g/dL 4.0    Protein Total      6.8 - 8.8 g/dL 7.3    Alkaline Phosphatase      40 - 150 U/L 77    ALT      0 - 50 U/L 23    AST      0 - 45 U/L 13    Cholesterol      <200 mg/dL 244 (H) 232 (H)   Triglycerides      <150 mg/dL 61 76   HDL Cholesterol      >49 mg/dL 94 86   LDL Cholesterol Calculated      <100 mg/dL 138 (H) 131 (H)   Non HDL Cholesterol      <130 mg/dL 150 (H) 146 (H)   Iron      35 - 180 ug/dL  109   Iron Binding Cap      240 - 430 ug/dL  276   Iron Saturation Index      15 - 46 %  39   Vitamin D Deficiency screening      20 - 75 ug/L 32    TSH      0.40 - 4.00 mU/L 1.51    Ferritin      8 - 252 ng/mL  66       Toshia was seen today for refill request.    Diagnoses and all orders for this visit:    Need for vaccination  -     ZOSTER VACCINE RECOMBINANT ADJUVANTED IM NJX    Dysthymic disorder  -     citalopram (CELEXA) 40 MG tablet; Take 0.5-1 tablets (20-40 mg) by mouth daily As directed  (trial decrease from 40 to 20 mg daily)    Essential hypertension    Obstructive sleep apnea    Mixed  hyperlipidemia    Encounter for screening mammogram for breast cancer  -     Mammogram, routine screening; Future    Health care maintenance  -     Dexa hip/pelvis/spine*; Future    Screening for osteoporosis  -     Dexa hip/pelvis/spine*; Future    Vitamin D deficiency  -     Dexa hip/pelvis/spine*; Future  -     Vitamin D Deficiency; Future    Menopause   -     Dexa hip/pelvis/spine*; Future    Chronic rhinitis  -     fluticasone (FLONASE) 50 MCG/ACT nasal spray; Spray 1 spray into both nostrils daily          See additional details in HPI  F/U approximately 6 months.    Mely Reeves M.D.  [facility]  [facility]     Patients: if you have questions or concerns about this progress note, please discuss them with the provider at a future office visit.

## 2020-12-30 ENCOUNTER — VIRTUAL VISIT (OUTPATIENT)
Dept: INTERNAL MEDICINE | Facility: CLINIC | Age: 75
End: 2020-12-30
Payer: COMMERCIAL

## 2020-12-30 DIAGNOSIS — E78.2 MIXED HYPERLIPIDEMIA: ICD-10-CM

## 2020-12-30 DIAGNOSIS — Z00.00 HEALTH CARE MAINTENANCE: ICD-10-CM

## 2020-12-30 DIAGNOSIS — I10 ESSENTIAL HYPERTENSION: ICD-10-CM

## 2020-12-30 DIAGNOSIS — Z13.820 SCREENING FOR OSTEOPOROSIS: ICD-10-CM

## 2020-12-30 DIAGNOSIS — F34.1 DYSTHYMIC DISORDER: ICD-10-CM

## 2020-12-30 DIAGNOSIS — G47.33 OBSTRUCTIVE SLEEP APNEA: ICD-10-CM

## 2020-12-30 DIAGNOSIS — Z78.0 MENOPAUSE: ICD-10-CM

## 2020-12-30 DIAGNOSIS — Z23 NEED FOR VACCINATION: Primary | ICD-10-CM

## 2020-12-30 DIAGNOSIS — E55.9 VITAMIN D DEFICIENCY: ICD-10-CM

## 2020-12-30 DIAGNOSIS — J31.0 CHRONIC RHINITIS: ICD-10-CM

## 2020-12-30 DIAGNOSIS — Z12.31 ENCOUNTER FOR SCREENING MAMMOGRAM FOR BREAST CANCER: ICD-10-CM

## 2020-12-30 PROCEDURE — 99214 OFFICE O/P EST MOD 30 MIN: CPT | Mod: 95 | Performed by: INTERNAL MEDICINE

## 2020-12-30 RX ORDER — CITALOPRAM HYDROBROMIDE 40 MG/1
20-40 TABLET ORAL DAILY
Qty: 90 TABLET | Refills: 3 | Status: SHIPPED | OUTPATIENT
Start: 2020-12-30 | End: 2022-06-05

## 2020-12-30 RX ORDER — FLUTICASONE PROPIONATE 50 MCG
1 SPRAY, SUSPENSION (ML) NASAL DAILY
Qty: 48 G | Refills: 1 | Status: SHIPPED | OUTPATIENT
Start: 2020-12-30

## 2020-12-30 RX ORDER — CITALOPRAM HYDROBROMIDE 40 MG/1
40 TABLET ORAL DAILY
Qty: 90 TABLET | Refills: 0 | OUTPATIENT
Start: 2020-12-30

## 2020-12-30 NOTE — PROGRESS NOTES
"This patient is being evaluated via a billable telephone visit; THIS VISIT WAS INITIATED BY THE PT, as AN ALTERNATIVE TO IN PERSON VISIT .       The patient has has been notified of following:      \"This billable telephone visit will be conducted via a call between you and your physician/provider. We have found that certain health care needs can be provided without the need for a physical exam.  This service lets us provide the care you need with a short phone conversation.  If a prescription is necessary we can send it directly to your pharmacy.  If lab work is needed we can place an order for that and you can then stop by our lab to have the test done at a later time. We can also place orders for a limited number of imaging tests if they are deemed urgently necessary.     If during the course of the call the physician/provider feels a telephone visit is not appropriate, you will not be charged for this service.\"     Due to efforts to reduce the spread of COVID-19 in the clinic, state, nation, telephone visits are encouraged currently. Patient understands that diagnose and advice is limited by the inability to exam him/her/them face-to-face.    Patient has given verbal consent for the virtual audio visit? Yes  Did patient initiate this virtual visit? Yes    Person spoken to: patient    This was a synchronous virtual visit  Time call initiated:  8:39 a.m.  Time call ended:  9:00 a.m.  Total length of call: 21 min    Mely Reeves M.D.  Internal Medicine  [facility]       Patients: if you have questions or concerns about this progress note, please discuss them with the provider at a future office visit.        "

## 2020-12-30 NOTE — NURSING NOTE
Chief Complaint   Patient presents with     Refill Request     Pt would like to discuss refills       MARGIE Padilla at 8:25 AM sign on 12/30/2020

## 2021-01-15 ENCOUNTER — HEALTH MAINTENANCE LETTER (OUTPATIENT)
Age: 76
End: 2021-01-15

## 2021-10-24 ENCOUNTER — HEALTH MAINTENANCE LETTER (OUTPATIENT)
Age: 76
End: 2021-10-24

## 2022-02-13 ENCOUNTER — HEALTH MAINTENANCE LETTER (OUTPATIENT)
Age: 77
End: 2022-02-13

## 2022-03-01 DIAGNOSIS — F34.1 DYSTHYMIC DISORDER: ICD-10-CM

## 2022-03-03 ENCOUNTER — TELEPHONE (OUTPATIENT)
Dept: INTERNAL MEDICINE | Facility: CLINIC | Age: 77
End: 2022-03-03
Payer: COMMERCIAL

## 2022-03-03 RX ORDER — CITALOPRAM HYDROBROMIDE 40 MG/1
20-40 TABLET ORAL DAILY
OUTPATIENT
Start: 2022-03-03

## 2022-03-03 NOTE — TELEPHONE ENCOUNTER
LVM for patient to schedule an appointment to establish for further med refills. Leandro is no longer here.

## 2022-03-03 NOTE — TELEPHONE ENCOUNTER
CITALOPRAM HBR 40 MG TABLET  Last Written Prescription Date:   12/30/2020  Last Fill Quantity: 90,   # refills: 3  Last Office Visit :  12/30/2020  Future Office visit:  None    Routing refill request to provider for review/approval because:  Second Request,   Dr. Reeves gone  Pt needs to establish care with new Provider.  Refer to clinic for review       Meera Cuba RN  Central Triage Red Flags/Med Refills

## 2022-06-05 ENCOUNTER — APPOINTMENT (OUTPATIENT)
Dept: CT IMAGING | Facility: HOSPITAL | Age: 77
End: 2022-06-05
Attending: EMERGENCY MEDICINE
Payer: COMMERCIAL

## 2022-06-05 ENCOUNTER — OFFICE VISIT (OUTPATIENT)
Dept: FAMILY MEDICINE | Facility: CLINIC | Age: 77
End: 2022-06-05
Payer: COMMERCIAL

## 2022-06-05 ENCOUNTER — HOSPITAL ENCOUNTER (OUTPATIENT)
Facility: HOSPITAL | Age: 77
Setting detail: OBSERVATION
Discharge: HOME | End: 2022-06-07
Attending: EMERGENCY MEDICINE | Admitting: HOSPITALIST
Payer: COMMERCIAL

## 2022-06-05 VITALS
SYSTOLIC BLOOD PRESSURE: 174 MMHG | TEMPERATURE: 97.7 F | WEIGHT: 214 LBS | OXYGEN SATURATION: 97 % | DIASTOLIC BLOOD PRESSURE: 68 MMHG | HEART RATE: 89 BPM | BODY MASS INDEX: 33.52 KG/M2 | RESPIRATION RATE: 18 BRPM

## 2022-06-05 DIAGNOSIS — R11.2 NAUSEA AND VOMITING, INTRACTABILITY OF VOMITING NOT SPECIFIED, UNSPECIFIED VOMITING TYPE: ICD-10-CM

## 2022-06-05 DIAGNOSIS — R10.13 ABDOMINAL PAIN, EPIGASTRIC: Primary | ICD-10-CM

## 2022-06-05 DIAGNOSIS — K56.609 SMALL BOWEL OBSTRUCTION (H): ICD-10-CM

## 2022-06-05 LAB
ALBUMIN SERPL-MCNC: 4 G/DL (ref 3.5–5)
ALBUMIN SERPL-MCNC: 4.1 G/DL (ref 3.5–5)
ALBUMIN UR-MCNC: NEGATIVE MG/DL
ALP SERPL-CCNC: 82 U/L (ref 45–120)
ALP SERPL-CCNC: 84 U/L (ref 45–120)
ALT SERPL W P-5'-P-CCNC: 20 U/L (ref 0–45)
ALT SERPL W P-5'-P-CCNC: 25 U/L (ref 0–45)
ANION GAP SERPL CALCULATED.3IONS-SCNC: 12 MMOL/L (ref 5–18)
ANION GAP SERPL CALCULATED.3IONS-SCNC: 13 MMOL/L (ref 5–18)
APPEARANCE UR: CLEAR
AST SERPL W P-5'-P-CCNC: 22 U/L (ref 0–40)
AST SERPL W P-5'-P-CCNC: 25 U/L (ref 0–40)
BASOPHILS # BLD AUTO: 0 10E3/UL (ref 0–0.2)
BASOPHILS NFR BLD AUTO: 0 %
BILIRUB DIRECT SERPL-MCNC: 0.1 MG/DL
BILIRUB SERPL-MCNC: 0.4 MG/DL (ref 0–1)
BILIRUB SERPL-MCNC: 0.4 MG/DL (ref 0–1)
BILIRUB UR QL STRIP: NEGATIVE
BUN SERPL-MCNC: 12 MG/DL (ref 8–28)
BUN SERPL-MCNC: 13 MG/DL (ref 8–28)
CALCIUM SERPL-MCNC: 9.5 MG/DL (ref 8.5–10.5)
CALCIUM SERPL-MCNC: 9.7 MG/DL (ref 8.5–10.5)
CHLORIDE BLD-SCNC: 104 MMOL/L (ref 98–107)
CHLORIDE BLD-SCNC: 104 MMOL/L (ref 98–107)
CO2 SERPL-SCNC: 23 MMOL/L (ref 22–31)
CO2 SERPL-SCNC: 25 MMOL/L (ref 22–31)
COLOR UR AUTO: YELLOW
CREAT SERPL-MCNC: 0.77 MG/DL (ref 0.6–1.1)
CREAT SERPL-MCNC: 0.83 MG/DL (ref 0.6–1.1)
EOSINOPHIL # BLD AUTO: 0 10E3/UL (ref 0–0.7)
EOSINOPHIL NFR BLD AUTO: 0 %
ERYTHROCYTE [DISTWIDTH] IN BLOOD BY AUTOMATED COUNT: 12.6 % (ref 10–15)
GFR SERPL CREATININE-BSD FRML MDRD: 73 ML/MIN/1.73M2
GFR SERPL CREATININE-BSD FRML MDRD: 80 ML/MIN/1.73M2
GLUCOSE BLD-MCNC: 147 MG/DL (ref 70–125)
GLUCOSE BLD-MCNC: 151 MG/DL (ref 70–125)
GLUCOSE UR STRIP-MCNC: NEGATIVE MG/DL
HCT VFR BLD AUTO: 43.3 % (ref 35–47)
HGB BLD-MCNC: 14.6 G/DL (ref 11.7–15.7)
HGB UR QL STRIP: NEGATIVE
HOLD SPECIMEN: NORMAL
HOLD SPECIMEN: NORMAL
IMM GRANULOCYTES # BLD: 0 10E3/UL
IMM GRANULOCYTES NFR BLD: 0 %
KETONES UR STRIP-MCNC: NEGATIVE MG/DL
LEUKOCYTE ESTERASE UR QL STRIP: NEGATIVE
LIPASE SERPL-CCNC: 20 U/L (ref 0–52)
LIPASE SERPL-CCNC: 29 U/L (ref 0–52)
LYMPHOCYTES # BLD AUTO: 1 10E3/UL (ref 0.8–5.3)
LYMPHOCYTES NFR BLD AUTO: 12 %
MCH RBC QN AUTO: 32.5 PG (ref 26.5–33)
MCHC RBC AUTO-ENTMCNC: 33.7 G/DL (ref 31.5–36.5)
MCV RBC AUTO: 96 FL (ref 78–100)
MONOCYTES # BLD AUTO: 0.3 10E3/UL (ref 0–1.3)
MONOCYTES NFR BLD AUTO: 3 %
NEUTROPHILS # BLD AUTO: 7.4 10E3/UL (ref 1.6–8.3)
NEUTROPHILS NFR BLD AUTO: 85 %
NITRATE UR QL: NEGATIVE
PH UR STRIP: 7 [PH] (ref 5–8)
PLATELET # BLD AUTO: 344 10E3/UL (ref 150–450)
POTASSIUM BLD-SCNC: 4.1 MMOL/L (ref 3.5–5)
POTASSIUM BLD-SCNC: 4.2 MMOL/L (ref 3.5–5)
PROT SERPL-MCNC: 7.3 G/DL (ref 6–8)
PROT SERPL-MCNC: 7.5 G/DL (ref 6–8)
RBC # BLD AUTO: 4.49 10E6/UL (ref 3.8–5.2)
SODIUM SERPL-SCNC: 139 MMOL/L (ref 136–145)
SODIUM SERPL-SCNC: 142 MMOL/L (ref 136–145)
SP GR UR STRIP: 1.02 (ref 1–1.03)
UROBILINOGEN UR STRIP-ACNC: 0.2 E.U./DL
WBC # BLD AUTO: 8.7 10E3/UL (ref 4–11)

## 2022-06-05 PROCEDURE — 84075 ASSAY ALKALINE PHOSPHATASE: CPT | Performed by: FAMILY MEDICINE

## 2022-06-05 PROCEDURE — 93005 ELECTROCARDIOGRAM TRACING: CPT | Performed by: EMERGENCY MEDICINE

## 2022-06-05 PROCEDURE — 83690 ASSAY OF LIPASE: CPT | Performed by: EMERGENCY MEDICINE

## 2022-06-05 PROCEDURE — 36415 COLL VENOUS BLD VENIPUNCTURE: CPT | Performed by: EMERGENCY MEDICINE

## 2022-06-05 PROCEDURE — 81003 URINALYSIS AUTO W/O SCOPE: CPT | Performed by: FAMILY MEDICINE

## 2022-06-05 PROCEDURE — 99213 OFFICE O/P EST LOW 20 MIN: CPT | Performed by: FAMILY MEDICINE

## 2022-06-05 PROCEDURE — 84450 TRANSFERASE (AST) (SGOT): CPT | Performed by: FAMILY MEDICINE

## 2022-06-05 PROCEDURE — 84155 ASSAY OF PROTEIN SERUM: CPT | Performed by: FAMILY MEDICINE

## 2022-06-05 PROCEDURE — 99220 PR INITIAL OBSERVATION CARE,LEVEL III: CPT | Performed by: HOSPITALIST

## 2022-06-05 PROCEDURE — C9803 HOPD COVID-19 SPEC COLLECT: HCPCS

## 2022-06-05 PROCEDURE — 85025 COMPLETE CBC W/AUTO DIFF WBC: CPT | Performed by: FAMILY MEDICINE

## 2022-06-05 PROCEDURE — 84460 ALANINE AMINO (ALT) (SGPT): CPT | Performed by: FAMILY MEDICINE

## 2022-06-05 PROCEDURE — 36415 COLL VENOUS BLD VENIPUNCTURE: CPT | Performed by: FAMILY MEDICINE

## 2022-06-05 PROCEDURE — 74177 CT ABD & PELVIS W/CONTRAST: CPT

## 2022-06-05 PROCEDURE — 99285 EMERGENCY DEPT VISIT HI MDM: CPT | Mod: 25

## 2022-06-05 PROCEDURE — 82947 ASSAY GLUCOSE BLOOD QUANT: CPT | Performed by: EMERGENCY MEDICINE

## 2022-06-05 PROCEDURE — 83690 ASSAY OF LIPASE: CPT | Performed by: FAMILY MEDICINE

## 2022-06-05 PROCEDURE — 250N000011 HC RX IP 250 OP 636: Performed by: EMERGENCY MEDICINE

## 2022-06-05 PROCEDURE — 82247 BILIRUBIN TOTAL: CPT | Performed by: FAMILY MEDICINE

## 2022-06-05 PROCEDURE — 82248 BILIRUBIN DIRECT: CPT | Performed by: EMERGENCY MEDICINE

## 2022-06-05 RX ORDER — PROMETHAZINE HYDROCHLORIDE 25 MG/1
25 TABLET ORAL EVERY 6 HOURS PRN
Qty: 12 TABLET | Refills: 0 | Status: SHIPPED | OUTPATIENT
Start: 2022-06-05 | End: 2022-06-05

## 2022-06-05 RX ORDER — IOPAMIDOL 755 MG/ML
100 INJECTION, SOLUTION INTRAVASCULAR ONCE
Status: COMPLETED | OUTPATIENT
Start: 2022-06-05 | End: 2022-06-05

## 2022-06-05 RX ORDER — PROMETHAZINE HYDROCHLORIDE 25 MG/1
25 TABLET ORAL EVERY 6 HOURS PRN
Qty: 12 TABLET | Refills: 0 | Status: SHIPPED | OUTPATIENT
Start: 2022-06-05 | End: 2022-10-13

## 2022-06-05 RX ADMIN — IOPAMIDOL 100 ML: 755 INJECTION, SOLUTION INTRAVENOUS at 22:56

## 2022-06-05 ASSESSMENT — ENCOUNTER SYMPTOMS
HEADACHES: 0
BACK PAIN: 1
FATIGUE: 0
DYSURIA: 0
CHILLS: 1
COUGH: 0
SHORTNESS OF BREATH: 0
WEAKNESS: 0
FEVER: 0
VOMITING: 1
ABDOMINAL PAIN: 1
NAUSEA: 1
DIARRHEA: 0

## 2022-06-05 NOTE — PATIENT INSTRUCTIONS
Stay hydrated    Nausea med as needed    Use the tylenol pm to help sleep    To emergency room if symptoms worsen/ don't improve

## 2022-06-05 NOTE — PROGRESS NOTES
Toshia Caruso is a 76 year old female who comes in today for abd pain/ nausea started about 2:30 this afternoon       Vomiting just started here    Queasy stomach for a few hours    Had [place] omelette for lunch, tasted okay    No odd or bad tasting food recently    No diarrhea    dranks some fluids    Coffee and water      No abd pain when eating fatty foods      No bloody or black stools    No pain or burning on urination    High pain tolerance    No history of pancreatitis    No etoh recently    Nonsmoker    Nausea better    Full physical not done     Mentation and affect are fine    No tremor of speech or extremity    Patient sitting in chair here    When I came in she had just vomited     Breathing fine    No resp distress    Heart and lungs normal    Radial pulses symmetric    No pretibial edema    Some right low back discomfort on palpation but no obvious costovertebral angle tenderness    abd soft; most tenderness in epigastrium; some mild  Tenderness right upper quadrant and periumbilical    No tenderness lower quadrants at all    No peritoneal signs       ua and bloodwork ordered    Cbc and ua normal  Lipase and cmp pending    ASSESSMENT / PLAN:  (R10.13) Abdominal pain, epigastric  (primary encounter diagnosis)  Comment: patient is afebrile with normal wbc.  The vomiting is very recent so not significantly dehydrated.  I did clarify with them that if symptoms worsen this evening, go to emergency room.  I printe out cbc and ua results that they could take to emergency room.  This would provide emergency room a baseline.  Plan: Comprehensive metabolic panel, CBC with         Platelets & Differential, Lipase, UA macro with        reflex to Microscopic and Culture - Clinc         Collect             (R11.2) Nausea and vomiting, intractability of vomiting not specified, unspecified vomiting type  Comment: did prescription for phenergan to use prn.  Try to stay hydrated.  Small frequent sips of liquid.   Plan:  promethazine (PHENERGAN) 25 MG tablet,         DISCONTINUED: promethazine (PHENERGAN) 25 MG         tablet           See AVS  Tylenol prn  Don't want to mask symptoms with strong pain pills etc     I reviewed the patient's medications, allergies, medical history, family history, and social history.    Leon Sahni MD

## 2022-06-06 LAB
HBA1C MFR BLD: 5.5 %
HOLD SPECIMEN: NORMAL
SARS-COV-2 RNA RESP QL NAA+PROBE: NEGATIVE

## 2022-06-06 PROCEDURE — 96360 HYDRATION IV INFUSION INIT: CPT | Mod: 59

## 2022-06-06 PROCEDURE — 99225 PR SUBSEQUENT OBSERVATION CARE,LEVEL II: CPT | Performed by: INTERNAL MEDICINE

## 2022-06-06 PROCEDURE — 258N000003 HC RX IP 258 OP 636: Performed by: INTERNAL MEDICINE

## 2022-06-06 PROCEDURE — 258N000003 HC RX IP 258 OP 636: Performed by: EMERGENCY MEDICINE

## 2022-06-06 PROCEDURE — 83036 HEMOGLOBIN GLYCOSYLATED A1C: CPT | Performed by: HOSPITALIST

## 2022-06-06 PROCEDURE — G0378 HOSPITAL OBSERVATION PER HR: HCPCS

## 2022-06-06 PROCEDURE — 96361 HYDRATE IV INFUSION ADD-ON: CPT

## 2022-06-06 PROCEDURE — 36415 COLL VENOUS BLD VENIPUNCTURE: CPT | Performed by: HOSPITALIST

## 2022-06-06 PROCEDURE — 87635 SARS-COV-2 COVID-19 AMP PRB: CPT | Performed by: EMERGENCY MEDICINE

## 2022-06-06 RX ORDER — SODIUM CHLORIDE 9 MG/ML
INJECTION, SOLUTION INTRAVENOUS CONTINUOUS
Status: ACTIVE | OUTPATIENT
Start: 2022-06-06 | End: 2022-06-07

## 2022-06-06 RX ORDER — ONDANSETRON 2 MG/ML
4 INJECTION INTRAMUSCULAR; INTRAVENOUS EVERY 6 HOURS PRN
Status: DISCONTINUED | OUTPATIENT
Start: 2022-06-06 | End: 2022-06-07 | Stop reason: HOSPADM

## 2022-06-06 RX ORDER — ONDANSETRON 4 MG/1
4 TABLET, ORALLY DISINTEGRATING ORAL EVERY 6 HOURS PRN
Status: DISCONTINUED | OUTPATIENT
Start: 2022-06-06 | End: 2022-06-07 | Stop reason: HOSPADM

## 2022-06-06 RX ORDER — ACETAMINOPHEN 325 MG/1
650 TABLET ORAL EVERY 6 HOURS PRN
Status: DISCONTINUED | OUTPATIENT
Start: 2022-06-06 | End: 2022-06-07 | Stop reason: HOSPADM

## 2022-06-06 RX ORDER — ACETAMINOPHEN 650 MG/1
650 SUPPOSITORY RECTAL EVERY 6 HOURS PRN
Status: DISCONTINUED | OUTPATIENT
Start: 2022-06-06 | End: 2022-06-07 | Stop reason: HOSPADM

## 2022-06-06 RX ADMIN — SODIUM CHLORIDE: 9 INJECTION, SOLUTION INTRAVENOUS at 20:21

## 2022-06-06 RX ADMIN — SODIUM CHLORIDE 1000 ML: 9 INJECTION, SOLUTION INTRAVENOUS at 01:02

## 2022-06-06 RX ADMIN — SODIUM CHLORIDE: 9 INJECTION, SOLUTION INTRAVENOUS at 07:51

## 2022-06-06 ASSESSMENT — ACTIVITIES OF DAILY LIVING (ADL)
DRESSING/BATHING_DIFFICULTY: NO
CHANGE_IN_FUNCTIONAL_STATUS_SINCE_ONSET_OF_CURRENT_ILLNESS/INJURY: NO
DIFFICULTY_EATING/SWALLOWING: NO
WEAR_GLASSES_OR_BLIND: NO
WALKING_OR_CLIMBING_STAIRS_DIFFICULTY: NO
TOILETING_ISSUES: NO
FALL_HISTORY_WITHIN_LAST_SIX_MONTHS: NO
DOING_ERRANDS_INDEPENDENTLY_DIFFICULTY: NO
CONCENTRATING,_REMEMBERING_OR_MAKING_DECISIONS_DIFFICULTY: NO

## 2022-06-06 NOTE — ED PROVIDER NOTES
EMERGENCY DEPARTMENT ENCOUNTER      NAME: Toshia Caruso  AGE: 76 year old female  YOB: 1945  MRN: [id number]  EVALUATION DATE & TIME: No admission date for patient encounter.    PCP: Mely Reeves    ED PROVIDER: Tiny rAagon DO      Chief Complaint   Patient presents with     Abdominal Pain     Flank Pain         FINAL IMPRESSION:  1. Small bowel obstruction (H)          ED COURSE & MEDICAL DECISION MAKING:    Pertinent Labs & Imaging studies reviewed. (See chart for details)    8:30 PM I met the patient and performed my initial interview and exam.  11:45 PM Discussed admission with Dr. Winston    76 year old female presents to the Emergency Department for evaluation of right-sided flank and abdominal pain.  This happened around 4 hours prior to arrival.  Fairly sudden onset.  Located to her upper abdomen, right upper quadrant, right flank.  Associated nausea and vomiting.  No shortness of breath.  No fever.  She has a history of kidney stones but reports this feels different.  She reports improvement in her symptoms.  Initially went to urgent care.  CBC obtained and unremarkable.  Urinalysis was unremarkable.  EKG was obtained due to the nature of her pain to rule out atypical ACS.  This shows no acute abnormalities.  No acute elevation in LFTs or lipase to suggest pancreatitis or choledocholithiasis.  Normal kidney function.  CT of the abdomen pelvis obtained and shows a small bowel obstruction with a transition point in the left pelvis.  She reports prior colonoscopy, one had a polyp otherwise unremarkable.  No history of abdominal surgery.  She has been having daily bowel movements and last Pain actually improved but patient not passing gas.  Given clear SBO with transition point and not passing gas, will admit.    At the conclusion of the encounter I discussed the results of all of the tests and the disposition. The questions were answered. The patient or family acknowledged understanding and  "was agreeable with the care plan.       MEDICATIONS GIVEN IN THE EMERGENCY:  Medications   0.9% sodium chloride BOLUS (has no administration in time range)   iopamidol (ISOVUE-370) solution 100 mL (100 mLs Intravenous Given 6/5/22 2040)       NEW PRESCRIPTIONS STARTED AT TODAY'S ER VISIT  New Prescriptions    No medications on file          =================================================================    HPI    Patient information was obtained from: patient    Use of : N/A      Toshia Caruso is a 76 year old female with a pertinent history of laminectomy, arthralgia of hip, HTN, obesity, constipation, dysthymic disorder, and obstructive sleep apnea who presents to this ED by walk in escorted by family member for evaluation of abdominal and flank pain. Patient developed abdominal pain around 1400 today. She reports her \"belly and back\" hurt, and the pain came on suddenly. The pain initially was across her abdomen. She thought it was just gas and that it would go away. She went to Urgent Care, and they said that if her pain got worse, she should go to the ED. She has had one episode of vomiting today. Patient said she has not felt well for the past 2-3 days. She endorses she is currently cold. She denies diarrhea and changes in urination.    Patient says she has a history of kidney stones, but her current pain does not feel like a kidney stone because it is not localized.     She has no history of abdominal surgery. She denies cholecystectomy. She did not want any medications in the ED to help.    Per chart review, patient presented to [facility] for abdominal pain and nausea which started around 14:30 today. Her vomiting began at the clinic. She had been having a queasy stomach for a few hours prior to arrival. She denied bloody or black stools, dysuria, shortness of breath, and dyspnea. Recent alcohol, diarrhea, and any odd or bad-tasting food. She denies abdominal pain when " eating fatty foods and has no history of pancreatitis. The PCP found to be afebrile with a normal WBC and told her to go to the ED if her symptoms worsen. She was prescribed promethazine (25 mg) to take PRN.      REVIEW OF SYSTEMS   Review of Systems   Constitutional: Positive for chills. Negative for fatigue and fever.   Respiratory: Negative for cough and shortness of breath.    Cardiovascular: Negative for chest pain.   Gastrointestinal: Positive for abdominal pain, nausea and vomiting (1x). Negative for diarrhea.   Genitourinary: Negative for dysuria.   Musculoskeletal: Positive for back pain (right sided).   Skin: Negative.    Neurological: Negative for syncope, weakness and headaches.   All other systems reviewed and are negative.      PAST MEDICAL HISTORY:  Past Medical History:   Diagnosis Date     Dysthymic disorder 8/4/2011     Obstructive sleep apnea 9/18/2012     Unspecified essential hypertension 9/13/2011       PAST SURGICAL HISTORY:  Past Surgical History:   Procedure Laterality Date     COLONOSCOPY N/A 4/28/2016    Procedure: COLONOSCOPY;  Surgeon: Victoria Mathis MD;  Location:  GI     COLONOSCOPY N/A 10/29/2019    Procedure: COLONOSCOPY, WITH POLYPECTOMY;  Surgeon: Perla Hay MD;  Location: [facility]     LAMINECTOMY CERIVCAL POSTERIOR THREE+ LEVELS  2002    C3-7           CURRENT MEDICATIONS:    B Complex-C (SUPER B COMPLEX/VITAMIN C PO)  diphenhydrAMINE-APAP, sleep, (TYLENOL PM EXTRA STRENGTH PO)  fluticasone (FLONASE) 50 MCG/ACT nasal spray  folic acid (FOLVITE) 400 MCG tablet  Glucosamine-Chondroit-Vit C-Mn (GLUCOSAMINE CHONDROITIN COMPLX) TABS  loratadine (CLARITIN) 10 MG tablet  Multiple Vitamin (MULTIVITAMINS) CAPS  Nutritional Supplements (ESTROVEN) TABS  Omega-3 Fatty Acids (OMEGA 3 PO)  Probiotic Product (SOLUBLE FIBER/PROBIOTICS PO)  promethazine (PHENERGAN) 25 MG tablet  VITAMIN D, CHOLECALCIFEROL, PO         ALLERGIES:  Allergies   Allergen Reactions      "Cats Itching     Seasonal Allergies      Eyes, throat sinus     Smoke. Itching     Watery eyes       FAMILY HISTORY:  Family History   Problem Relation Age of Onset     Diabetes Mother      Thyroid Disease Mother 60        treated with radioactive iodine     Diabetes Father      C.A.D. Father         silent heart attacks     Glaucoma Father      Colon Cancer Father 70        treated with surgery, 6\" colon removed     Diabetes Maternal Grandfather      Thyroid Disease Sister 30       SOCIAL HISTORY:   Social History     Socioeconomic History     Marital status:    Tobacco Use     Smoking status: Never Smoker     Smokeless tobacco: Never Used   Substance and Sexual Activity     Alcohol use: Yes     Comment: rare     Drug use: No     Sexual activity: Yes     Partners: Male   Other Topics Concern     Exercise Yes     Comment: goes to a gym and does strength 2/wk and cardiovascular 3/wk       VITALS:  BP (!) 180/94   Pulse 87   Temp 98.2  F (36.8  C) (Oral)   Resp 20   Ht 1.702 m (5' 7\")   Wt 97.1 kg (214 lb)   SpO2 98%   BMI 33.52 kg/m      PHYSICAL EXAM    Physical Exam  Constitutional:       General: She is not in acute distress.  HENT:      Head: Normocephalic and atraumatic.      Mouth/Throat:      Pharynx: Oropharynx is clear.   Eyes:      Pupils: Pupils are equal, round, and reactive to light.   Cardiovascular:      Rate and Rhythm: Normal rate and regular rhythm.      Pulses: Normal pulses.      Heart sounds: Normal heart sounds.   Pulmonary:      Effort: Pulmonary effort is normal.      Breath sounds: Normal breath sounds.   Abdominal:      General: Abdomen is flat. Bowel sounds are normal.      Palpations: Abdomen is soft.      Tenderness: There is abdominal tenderness in the right upper quadrant and epigastric area. There is right CVA tenderness.   Musculoskeletal:         General: Normal range of motion.   Skin:     General: Skin is warm and dry.      Capillary Refill: Capillary refill takes " less than 2 seconds.   Neurological:      General: No focal deficit present.      Mental Status: She is alert and oriented to person, place, and time.       LAB:  All pertinent labs reviewed and interpreted.  Labs Ordered and Resulted from Time of ED Arrival to Time of ED Departure   BASIC METABOLIC PANEL - Abnormal       Result Value    Sodium 139      Potassium 4.2      Chloride 104      Carbon Dioxide (CO2) 23      Anion Gap 12      Urea Nitrogen 12      Creatinine 0.77      Calcium 9.5      Glucose 147 (*)     GFR Estimate 80     HEPATIC FUNCTION PANEL - Normal    Bilirubin Total 0.4      Bilirubin Direct 0.1      Protein Total 7.5      Albumin 4.1      Alkaline Phosphatase 84      AST 25      ALT 25     LIPASE - Normal    Lipase 20     COVID-19 VIRUS (CORONAVIRUS) BY PCR       RADIOLOGY:  Reviewed all pertinent imaging. Please see official radiology report.  CT Abdomen Pelvis w Contrast   Final Result   IMPRESSION:    1.  Evidence for small bowel obstruction with transition point in the left upper pelvis as detailed above. Please see noted images. No bowel wall thickening, significant mesenteric edema or free intraperitoneal air.      2.  Moderate fluid distention of the stomach.      3.  Indeterminant 1.7 cm right adrenal nodule. Management recommendations as detailed below.      REFERENCE:   Management of Incidental Adrenal Masses: A White Paper of the ACR Incidental Findings Committee. J Am Hussein Radiol 2017;14:0421-0603.      ? 1 cm and < 4 cm:       No prior imaging and no history of cancer:   1-2 cm: probably benign. Consider 12-month CT adrenal follow-up.                EKG:    Performed at: 06/05/2022    Impression: normal sinus rhythm, cannot rule out anterior infarct (age undetermined), abnormal ECG    Rate: 90  Rhythm: normal sinus rhythm  Axis: -8  ME Interval: 178  QRS Interval: 94  QTc Interval: 472  ST Changes: none  Comparison: no previous EKG to compare to    I have independently reviewed and  interpreted the EKG(s) documented above.    I, Alia Mcdaniels, am serving as a scribe to document services personally performed by Dr. Tiyn Aragon based on my observation and the provider's statements to me. I, Tiny Aragon, DO attest that Alia Mcdaniels is acting in a scribe capacity, has observed my performance of the services and has documented them in accordance with my direction.    Tiny Aragon DO  Emergency Medicine  [facility] EMERGENCY DEPARTMENT  [address] [place] [id number]  [phone]  Dept: [phone]     Tiny Aragon DO  06/05/22 4038

## 2022-06-06 NOTE — ED NOTES
"ER Boarding Note:    Here for c/o AP.  Found to have SBO via CT scan.  From home with SO.      Alert and oriented.  On RA.  No respiratory distress.  Sats 95%  No CP.  HR 70s.  COVID negative.    SBO.  Pain is 1/10.  Denies N/V/D.  NPO.    Voiding adequately.  Gets up on her own, steady on feet.    No skin impairments.    Has NS @75ml/hr.    VS obtained: /60   Pulse 76   Temp 99.1  F (37.3  C) (Oral)   Resp 18   Ht 1.702 m (5' 7\")   Wt 97.1 kg (214 lb)   SpO2 95%   BMI 33.52 kg/m  .  Plan: waiting for admission bed      "

## 2022-06-06 NOTE — ED NOTES
"[facility] ED Handoff Report    ED Chief Complaint: abdominal discomfort    ED Diagnosis:  (K56.609) Small bowel obstruction (H)  Comment:   Plan: NPO, meds       PMH:    Past Medical History:   Diagnosis Date     Dysthymic disorder 8/4/2011     Obstructive sleep apnea 9/18/2012     Unspecified essential hypertension 9/13/2011        Code Status:  Full Code     Falls Risk: No Band: Not applicable    Current Living Situation/Residence: lives with a significant other     Elimination Status: Continent: Yes     Activity Level: Independent    Patients Preferred Language:  English     Needed: No    Vital Signs:  BP (!) 142/62   Pulse 81   Temp 98.2  F (36.8  C) (Oral)   Resp 20   Ht 1.702 m (5' 7\")   Wt 97.1 kg (214 lb)   SpO2 96%   BMI 33.52 kg/m       Cardiac Rhythm: nsr    Pain Score: 1/10    Is the Patient Confused:  No    Last Food or Drink: 6/5/22 at 1400    Focused Assessment:      Tests Performed: Done: Labs and Imaging    Treatments Provided:  meds and fluids    Family Dynamics/Concerns: No    Family Updated On Visitor Policy: Yes    Plan of Care Communicated to Family: Yes    Who Was Updated about Plan of Care: self and sig other    Belongings Checklist Done and Signed by Patient: Yes    Medications sent with patient: n/a    Covid: asymptomatic , negative    Additional Information:     RN: Cheryl Gomez RN   6/6/2022 6:39 AM     "

## 2022-06-06 NOTE — ED TRIAGE NOTES
Pt developed upper abdominal pain this afternoon at 1400, it radiates down to belly button and recently wraps around to right flank. She also endorses nausea and vomiting. She was seen at her clinic earlier today, but pain became more severe and she developed flank pain. Hx of one previous kidney stone.      Triage Assessment     Row Name 06/05/22 8288       Triage Assessment (Adult)    Airway WDL WDL       Respiratory WDL    Respiratory WDL WDL       Skin Circulation/Temperature WDL    Skin Circulation/Temperature WDL WDL       Cardiac WDL    Cardiac WDL WDL       Peripheral/Neurovascular WDL    Peripheral Neurovascular WDL WDL       Cognitive/Neuro/Behavioral WDL    Cognitive/Neuro/Behavioral WDL WDL

## 2022-06-06 NOTE — PLAN OF CARE
Goal Outcome Evaluation:        Pt reports no pain, alert and oriented, VSS.  Pt has been passing gas, had 2 BM's, advanced to a full liquid diet, ate 100% of dinner.  Pt independent in room, IVF @ 75ml/hr.

## 2022-06-06 NOTE — PROGRESS NOTES
[facility]    PROGRESS NOTE - Hospitalist Service    ASSESSMENT AND PLAN     Principal Problem:    Small bowel obstruction (H)  Active Problems:    Essential hypertension    Toshia Caruso is a 76 year old female admitted on 6/5/2022. She came to the emergency department for evaluation of abdominal pain, nausea and vomiting.     Acute small bowel obstruction- improving    CT scan with transition point in the left upper pelvis, no bowel wall thickening, significant mesenteric edema or free intraperitoneal air; moderate fluid distention of the stomach.   No previous abdominal surgeries or bowel obstructions   IV hydration, CLD and ADAT     Essential hypertension- controlled    Currently not on any medications PTA   Monitor and treat as needed     Hyperglycemia   A1c 5.5      Radiographic findings   1.7 right adrenal nodule   12 months follow-up with CT scan recommended     Obesity   BMI 33.52    Barriers to discharge: Tolerating diet, bowel movement    Anticipated length of stay: 1 more night    Subjective:  Patient resting comfortably in bed.  Notes that her pain is currently 0-1 out of 10.  Denies any current nausea.  Is feeling much better.  Passing gas.  No nausea.    PHYSICAL EXAM  Temp:  [97.7  F (36.5  C)-99.1  F (37.3  C)] 97.8  F (36.6  C)  Pulse:  [69-89] 76  Resp:  [18-20] 18  BP: (125-180)/(60-94) 131/62  SpO2:  [94 %-98 %] 95 %  Wt Readings from Last 1 Encounters:   06/05/22 97.1 kg (214 lb)       Intake/Output Summary (Last 24 hours) at 6/6/2022 1503  Last data filed at 6/6/2022 0330  Gross per 24 hour   Intake 1000 ml   Output --   Net 1000 ml      Body mass index is 33.52 kg/m .    GENRL: Alert and answering questions appropriately. Not in acute distress. Lying in bed   HEENT: no JVP elevation, no lymphadenopathy or thyromegaly  CHEST: Clear to auscultation bilaterally. No wheezes, rhonchi or crackles. Breathing easily   HEART: Regular rate and rhythm, S1S2 auscultated. No  murmurs  ABDMN: Soft. Non-tender, non-distended. No organomegaly. No guarding or rigidity. Bowel sounds present   EXTRM: trace pedal edema, DP pulses 2+.   NEURO: Cranial nerves II-XII grossly intact. No focal neurological deficit. No involuntary movements. Normal mentation  PSYCH: Normal affect and mood.   INTGM: No skin rash, no cyanosis or clubbing    PERTINENT LABS/IMAGING:  Results for orders placed or performed during the hospital encounter of 06/05/22   CT Abdomen Pelvis w Contrast    Impression    IMPRESSION:   1.  Evidence for small bowel obstruction with transition point in the left upper pelvis as detailed above. Please see noted images. No bowel wall thickening, significant mesenteric edema or free intraperitoneal air.    2.  Moderate fluid distention of the stomach.    3.  Indeterminant 1.7 cm right adrenal nodule. Management recommendations as detailed below.    REFERENCE:  Management of Incidental Adrenal Masses: A White Paper of the ACR Incidental Findings Committee. J Am Hussein Radiol 2017;14:8477-6766.    ? 1 cm and < 4 cm:     No prior imaging and no history of cancer:  1-2 cm: probably benign. Consider 12-month CT adrenal follow-up.         Most Recent 3 CBC's:Recent Labs   Lab Test 06/05/22 1820 08/14/19  1257 06/02/18  0936   WBC 8.7 5.2 4.5   HGB 14.6 14.7 14.5   MCV 96 98 97    292 268       Recent Labs   Lab Test 09/19/19  0820 06/02/18  0936 11/13/15  0951   CHOL 232*   < > 216*   HDL 86   < > 81   *   < > 124   TRIG 76   < > 61   CHOLHDLRATIO  --   --  2.7    < > = values in this interval not displayed.     Recent Labs   Lab Test 09/19/19  0820 08/14/19  1257 06/02/18  0936   * 138* 126*     Recent Labs   Lab Test 06/05/22  2052      POTASSIUM 4.2   CHLORIDE 104   CO2 23   *   BUN 12   CR 0.77   GFRESTIMATED 80   JASON 9.5     Recent Labs   Lab Test 06/06/22  0824   A1C 5.5     Recent Labs   Lab Test 06/05/22  1820 08/14/19  1257 06/02/18  0936   HGB 14.6  14.7 14.5     No results for input(s): TROPONINI in the last 87660 hours.  No results for input(s): BNP, NTBNPI, NTBNP in the last 36824 hours.  Recent Labs   Lab Test 08/14/19  1257   TSH 1.51     No results for input(s): INR in the last 74976 hours.    Stacey Chaparro,   [facility] Medicine Service  [phone]

## 2022-06-06 NOTE — PHARMACY-ADMISSION MEDICATION HISTORY
Pharmacy Note - Admission Medication History    Pertinent Provider Information: Patient not taking any prescription medications, mainly OTC supplements      ______________________________________________________________________    Prior To Admission (PTA) med list completed and updated in EMR.       PTA Med List   Medication Sig Last Dose     B Complex-C (SUPER B COMPLEX/VITAMIN C PO) Take 1 capsule by mouth daily as needed Past Week at prn     diphenhydrAMINE-APAP, sleep, (TYLENOL PM EXTRA STRENGTH PO) Take 1 tablet by mouth nightly as needed Past Week at prn     fluticasone (FLONASE) 50 MCG/ACT nasal spray Spray 1 spray into both nostrils daily (Patient taking differently: Spray 1 spray into both nostrils daily as needed for rhinitis or allergies) Past Month at Unknown time     folic acid (FOLVITE) 400 MCG tablet Take 400 mcg by mouth daily 6/5/2022 at Unknown time     Glucosamine-Chondroit-Vit C-Mn (GLUCOSAMINE CHONDROITIN COMPLX) TABS Take 1 tablet by mouth daily as needed Occassionally Past Month at prn     loratadine (CLARITIN) 10 MG tablet Take 10 mg by mouth as needed for allergies Past Month at prn     Nutritional Supplements (ESTROVEN) TABS Take 1 tablet by mouth daily. 6/5/2022 at Unknown time     omega 3 1000 MG CAPS Take 1 capsule by mouth daily 6/5/2022 at Unknown time     Probiotic Product (SOLUBLE FIBER/PROBIOTICS PO) Take 1 capsule by mouth daily align 6/5/2022 at Unknown time     Vitamin D (Cholecalciferol) 25 MCG (1000 UT) TABS Take 1,000 Units by mouth daily 6/5/2022 at Unknown time       Information source(s): Patient, Clinic records and [facility]  Method of interview communication: in-person    Summary of Changes to PTA Med List  New: -  Discontinued: -  Changed: -    Patient was asked about OTC/herbal products specifically.  PTA med list reflects this.    In the past week, patient estimated taking medication this percent of the time:  greater than 90%.    Allergies were  reviewed, assessed, and updated with the patient.      Patient does not anticipate needing any multi-use medications during admission.    The information provided in this note is only as accurate as the sources available at the time of the update(s).    Thank you for the opportunity to participate in the care of this patient.    JAS LANDA RPH  6/6/2022 8:00 AM

## 2022-06-06 NOTE — PROGRESS NOTES
Pt has been much more comfortable in room.  Pt tolerating clear liquids well, did rest/sleep, denies pain.  Pt has had 2 medium size BM's.  VSS.

## 2022-06-06 NOTE — H&P
"[facility]    History and Physical - Hospitalist Service       Date of Admission:  6/5/2022    Assessment & Plan      Toshia Caruso is a 76 year old female admitted on 6/5/2022. She came to the emergency department for evaluation of abdominal pain, nausea and vomiting.    1.  Small bowel obstruction  CT scan showed transition point in the left upper pelvis, no bowel wall thickening, significant mesenteric edema or free intraperitoneal air; moderate fluid distention of the stomach.  Patient denies previous abdominal surgeries or bowel obstructions  Last bowel movement on 6/5/2022 at 3 PM  Symptoms are improving  Supportive management, bowel rest  IV hydration  Consider SBFT    2.  Essential hypertension  Currently not on any medications PTA  Monitor and treat as needed    3.  Hyperglycemia  Check hemoglobin A1c    4.  Radiographic findings  1.7 right adrenal nodule  12 months follow-up with CT scan recommended    5.  Obesity  BMI 33.52         Diet: NPO for Medical/Clinical Reasons Except for: Meds    DVT Prophylaxis: Ambulate every shift  Villegas Catheter: Not present  Central Lines: None  Cardiac Monitoring: None  Code Status: Full Code      Clinically Significant Risk Factors Present on Admission                  # Obesity: Estimated body mass index is 33.52 kg/m  as calculated from the following:    Height as of this encounter: 1.702 m (5' 7\").    Weight as of this encounter: 97.1 kg (214 lb).      Disposition Plan   Expected Discharge:  1 day  Anticipated discharge location:  Awaiting care coordination huddle  Delays:    Bowel obstruction       The patient's care was discussed with the Patient and Patient's Family.    Roel Winston MD  Hospitalist Service  [facility]  Securely message with the Vocera Web Console (learn more here)  Text page via [employer] Paging/Directory         ______________________________________________________________________    Chief Complaint "   Abdominal pain, nausea, vomiting    History is obtained from the patient, electronic health record and emergency department physician    History of Present Illness   Toshia Caruso is a 76 year old female who came to the emergency department for evaluation of abdominal pain, nausea and vomiting.  Past medical history of essential hypertension, HANNA, dysthymic disorder, kidney stone.  The symptoms started in the afternoon and her last meal was at noon for lunch.  She described an epigastric pain that radiated into the bellybutton with associated nausea.  Patient went to the urgent care where she had an emesis.  She was prescribed Phenergan and advised to be seen in the ED for recurring or worsening symptoms.  Patient reports that her last bowel movement was at 3 PM and it was soft without melena or hematochezia.  However, since then she felt bloated, nauseated and has not passed gas.  The pain then radiated into the right lower quadrant and into her back.  She was concerned about kidney stones.  She has had similar symptoms in the past, many years ago, where she was given an enema and the symptoms resolved after a bowel movement.  He denies fevers, chills, recent traveling or sick contacts.  She denies previous abdominal surgeries including C-sections.  She also denies tobacco, alcohol or drugs.  Her last colonoscopy was about 3 years ago and had a polypectomy.  Her father had colon cancer.    Review of Systems    The 10 point Review of Systems is negative other than noted in the HPI or here.     Past Medical History    I have reviewed this patient's medical history and updated it with pertinent information if needed.   Past Medical History:   Diagnosis Date     Dysthymic disorder 8/4/2011     Obstructive sleep apnea 9/18/2012     Unspecified essential hypertension 9/13/2011       Past Surgical History   I have reviewed this patient's surgical history and updated it with pertinent information if needed.  Past Surgical  "History:   Procedure Laterality Date     COLONOSCOPY N/A 4/28/2016    Procedure: COLONOSCOPY;  Surgeon: Victoria Mathis MD;  Location:  GI     COLONOSCOPY N/A 10/29/2019    Procedure: COLONOSCOPY, WITH POLYPECTOMY;  Surgeon: Perla Hay MD;  Location: [facility] OR     LAMINECTOMY CERIVCAL POSTERIOR THREE+ LEVELS  2002    C3-7       Social History   I have reviewed this patient's social history and updated it with pertinent information if needed.  Social History     Tobacco Use     Smoking status: Never Smoker     Smokeless tobacco: Never Used   Substance Use Topics     Alcohol use: Yes     Comment: rare     Drug use: No       Family History   I have reviewed this patient's family history and updated it with pertinent information if needed.  Family History   Problem Relation Age of Onset     Diabetes Mother      Thyroid Disease Mother 60        treated with radioactive iodine     Diabetes Father      C.A.D. Father         silent heart attacks     Glaucoma Father      Colon Cancer Father 70        treated with surgery, 6\" colon removed     Diabetes Maternal Grandfather      Thyroid Disease Sister 30       Prior to Admission Medications   Prior to Admission Medications   Prescriptions Last Dose Informant Patient Reported? Taking?   B Complex-C (SUPER B COMPLEX/VITAMIN C PO)   Yes No   Glucosamine-Chondroit-Vit C-Mn (GLUCOSAMINE CHONDROITIN COMPLX) TABS   Yes No   Sig: Occassionally   Multiple Vitamin (MULTIVITAMINS) CAPS   Yes No   Sig: Take 1 tablet by mouth daily Vipul red   Nutritional Supplements (ESTROVEN) TABS   Yes No   Sig: Take 1 tablet by mouth daily.   Omega-3 Fatty Acids (OMEGA 3 PO)   Yes No   Probiotic Product (SOLUBLE FIBER/PROBIOTICS PO)   Yes No   Sig: allign   VITAMIN D, CHOLECALCIFEROL, PO   Yes No   Sig: Take 1 tablet by mouth daily.   diphenhydrAMINE-APAP, sleep, (TYLENOL PM EXTRA STRENGTH PO)   Yes No   Sig: Take by mouth as needed   fluticasone (FLONASE) 50 MCG/ACT nasal " spray   No No   Sig: Spray 1 spray into both nostrils daily   folic acid (FOLVITE) 400 MCG tablet   Yes No   Sig: daily   loratadine (CLARITIN) 10 MG tablet   Yes No   Sig: Take 10 mg by mouth as needed for allergies   promethazine (PHENERGAN) 25 MG tablet   No No   Sig: Take 1 tablet (25 mg) by mouth every 6 hours as needed for nausea      Facility-Administered Medications: None     Allergies   Allergies   Allergen Reactions     Cats Itching     Seasonal Allergies      Eyes, throat sinus     Smoke. Itching     Watery eyes       Physical Exam   Vital Signs: Temp: 98.2  F (36.8  C) Temp src: Oral BP: (!) 180/94 Pulse: 87   Resp: 20 SpO2: 98 % O2 Device: None (Room air)    Weight: 214 lbs 0 oz    Constitutional: awake and alert  Respiratory: no increased work of breathing and good air exchange  Cardiovascular: regular rate and rhythm and normal S1 and S2  GI: hypoactive bowel sounds, soft and non-tender  Skin: no bruising or bleeding and no redness, warmth, or swelling  Musculoskeletal: Nonpitting edema,  there is no redness, warmth, or swelling of the joints  Neurologic: Mental Status Exam:  Level of Alertness:   awake  Motor Exam:  moves all extremities well and symmetrically  Neuropsychiatric: General: normal, calm and normal eye contact    Data   Data reviewed today: I reviewed all medications, new labs and imaging results over the last 24 hours. I personally reviewed the EKG tracing showing Sinus rhythm at 90, nonspecific ST waves, heart rate is faster when compared to EKG from 2011..    Recent Labs   Lab 06/05/22 2052 06/05/22  1820   WBC  --  8.7   HGB  --  14.6   MCV  --  96   PLT  --  344    142   POTASSIUM 4.2 4.1   CHLORIDE 104 104   CO2 23 25   BUN 12 13   CR 0.77 0.83   ANIONGAP 12 13   JASON 9.5 9.7   * 151*   ALBUMIN 4.1 4.0   PROTTOTAL 7.5 7.3   BILITOTAL 0.4 0.4   ALKPHOS 84 82   ALT 25 20   AST 25 22   LIPASE 20 29     Recent Results (from the past 24 hour(s))   CT Abdomen Pelvis w  Contrast    Narrative    EXAM: CT ABDOMEN PELVIS W CONTRAST  LOCATION: [facility]  DATE/TIME: 6/5/2022 10:38 PM    INDICATION: Epigastric abdominal pain.  COMPARISON: Pelvic MRI 03/11/2016.  TECHNIQUE: CT scan of the abdomen and pelvis was performed following injection of IV contrast. Multiplanar reformats were obtained. Dose reduction techniques were used.  CONTRAST: 100 mL Isovue 370.    FINDINGS:   LOWER CHEST: No infiltrates or effusions. Minimal sliding esophageal hiatal hernia.    HEPATOBILIARY: Normal.    PANCREAS: Normal.    SPLEEN: Normal.    ADRENAL GLANDS: Left adrenal gland negative. 1.7 cm right adrenal nodule.     KIDNEYS/BLADDER: Normal.    BOWEL: Small bowel obstruction with dilated small bowel loops extending deep into the pelvis with transition point in the left upper pelvis (series 3, images ). Distal small bowel decompressed. No bowel wall thickening or perforation. No   significant mesenteric edema. Moderate fluid distention of the stomach. Moderate amount of stool in the colon.    LYMPH NODES: No lymphadenopathy.    VASCULATURE: Normal caliber abdominal aorta. Moderate aortic calcification.    PELVIC ORGANS: Unremarkable. No free fluid.    MUSCULOSKELETAL: Degenerative changes both hips, greater on the right.      Impression    IMPRESSION:   1.  Evidence for small bowel obstruction with transition point in the left upper pelvis as detailed above. Please see noted images. No bowel wall thickening, significant mesenteric edema or free intraperitoneal air.    2.  Moderate fluid distention of the stomach.    3.  Indeterminant 1.7 cm right adrenal nodule. Management recommendations as detailed below.    REFERENCE:  Management of Incidental Adrenal Masses: A White Paper of the ACR Incidental Findings Committee. J Am Hussein Radiol 2017;14:2562-1934.    ? 1 cm and < 4 cm:     No prior imaging and no history of cancer:  1-2 cm: probably benign. Consider 12-month CT adrenal  follow-up.

## 2022-06-07 VITALS
HEART RATE: 65 BPM | HEIGHT: 67 IN | BODY MASS INDEX: 33.59 KG/M2 | DIASTOLIC BLOOD PRESSURE: 64 MMHG | TEMPERATURE: 98 F | WEIGHT: 214 LBS | RESPIRATION RATE: 16 BRPM | SYSTOLIC BLOOD PRESSURE: 133 MMHG | OXYGEN SATURATION: 96 %

## 2022-06-07 LAB
ATRIAL RATE - MUSE: 90 BPM
DIASTOLIC BLOOD PRESSURE - MUSE: NORMAL MMHG
INTERPRETATION ECG - MUSE: NORMAL
P AXIS - MUSE: -14 DEGREES
PR INTERVAL - MUSE: 178 MS
QRS DURATION - MUSE: 94 MS
QT - MUSE: 386 MS
QTC - MUSE: 472 MS
R AXIS - MUSE: -8 DEGREES
SYSTOLIC BLOOD PRESSURE - MUSE: NORMAL MMHG
T AXIS - MUSE: -2 DEGREES
VENTRICULAR RATE- MUSE: 90 BPM

## 2022-06-07 PROCEDURE — G0378 HOSPITAL OBSERVATION PER HR: HCPCS

## 2022-06-07 PROCEDURE — 99217 PR OBSERVATION CARE DISCHARGE: CPT | Performed by: INTERNAL MEDICINE

## 2022-06-07 NOTE — DISCHARGE SUMMARY
[facility]  Hospitalist Discharge Summary      Date of Admission:  6/5/2022  Date of Discharge:  6/7/2022 10:47 AM  Discharging Provider: Stacey Chaparro DO  Discharge Service: Hospitalist Service    Discharge Diagnoses   Small bowel obstruction  Adrenal nodule    Follow-ups Needed After Discharge   Follow-up Appointments     Follow-up and recommended labs and tests       Follow up with primary care provider, Mely Reeves, within 7 days   for hospital follow- up.             Unresulted Labs Ordered in the Past 30 Days of this Admission     No orders found from 5/6/2022 to 6/6/2022.        Discharge Disposition   Discharged to home  Condition at discharge: Stable      Hospital Course   Acute small bowel obstruction- improving               CT scan with transition point in the left upper pelvis, no bowel wall thickening, significant mesenteric edema or free intraperitoneal air; moderate fluid distention of the stomach.              No previous abdominal surgeries    One previous bowel obstructions approximately 10 years ago                Essential hypertension- controlled               Currently not on any medications PTA              Monitor and treat as needed     Hyperglycemia              A1c 5.5      Radiographic findings              1.7 right adrenal nodule              12 months follow-up with CT scan recommended     Obesity              BMI 33.52    Consultations This Hospital Stay   CARE MANAGEMENT / SOCIAL WORK IP CONSULT    Code Status   Prior    Time Spent on this Encounter   I, Stacey Chaparro DO, personally saw the patient today and spent greater than 30 minutes discharging this patient.       DO [facility] [address] [address]  Phone: [phone]  Fax: [phone]  ______________________________________________________________________    Physical Exam   Vital Signs: Temp:  98  F (36.7  C) Temp src: Oral BP: 133/64 Pulse: 65   Resp: 16 SpO2: 96 % O2 Device: None (Room air)    Weight: 214 lbs 0 oz  GENRL: Alert and answering questions appropriately. Not in acute distress. Lying in bed   HEENT: no JVP elevation, no lymphadenopathy or thyromegaly  CHEST: Clear to auscultation bilaterally. No wheezes, rhonchi or crackles. Breathing easily   HEART: Regular rate and rhythm, S1S2 auscultated. No murmurs  ABDMN: Soft. Non-tender, non-distended. No organomegaly. No guarding or rigidity. Bowel sounds present   EXTRM: trace pedal edema, DP pulses 2+.   NEURO: Cranial nerves II-XII grossly intact. No focal neurological deficit. No involuntary movements. Normal mentation  PSYCH: Normal affect and mood.   INTGM: No skin rash, no cyanosis or clubbing       Primary Care Physician   Mely Reeves    Discharge Orders      Reason for your hospital stay    Small bowel obstruction     Follow-up and recommended labs and tests     Follow up with primary care provider, Mely Reeves, within 7 days for hospital follow- up.     Activity    Your activity upon discharge: activity as tolerated     Diet    Follow this diet upon discharge: Orders Placed This Encounter      Combination Diet Low Fiber- Advance as tolerated       Significant Results and Procedures   Most Recent 3 CBC's:Recent Labs   Lab Test 06/05/22 1820 08/14/19  1257 06/02/18  0936   WBC 8.7 5.2 4.5   HGB 14.6 14.7 14.5   MCV 96 98 97    292 268     Most Recent 3 BMP's:Recent Labs   Lab Test 06/05/22 2052 06/05/22 1820 08/14/19  1257    142 141   POTASSIUM 4.2 4.1 4.5   CHLORIDE 104 104 107   CO2 23 25 30   BUN 12 13 14   CR 0.77 0.83 0.80   ANIONGAP 12 13 4   JASON 9.5 9.7 8.7   * 151* 98     Most Recent 2 LFT's:Recent Labs   Lab Test 06/05/22 2052 06/05/22 1820   AST 25 22   ALT 25 20   ALKPHOS 84 82   BILITOTAL 0.4 0.4   ,   Results for orders placed or performed during the hospital encounter of 06/05/22   CT  Abdomen Pelvis w Contrast    Narrative    EXAM: CT ABDOMEN PELVIS W CONTRAST  LOCATION: [facility]  DATE/TIME: 6/5/2022 10:38 PM    INDICATION: Epigastric abdominal pain.  COMPARISON: Pelvic MRI 03/11/2016.  TECHNIQUE: CT scan of the abdomen and pelvis was performed following injection of IV contrast. Multiplanar reformats were obtained. Dose reduction techniques were used.  CONTRAST: 100 mL Isovue 370.    FINDINGS:   LOWER CHEST: No infiltrates or effusions. Minimal sliding esophageal hiatal hernia.    HEPATOBILIARY: Normal.    PANCREAS: Normal.    SPLEEN: Normal.    ADRENAL GLANDS: Left adrenal gland negative. 1.7 cm right adrenal nodule.     KIDNEYS/BLADDER: Normal.    BOWEL: Small bowel obstruction with dilated small bowel loops extending deep into the pelvis with transition point in the left upper pelvis (series 3, images ). Distal small bowel decompressed. No bowel wall thickening or perforation. No   significant mesenteric edema. Moderate fluid distention of the stomach. Moderate amount of stool in the colon.    LYMPH NODES: No lymphadenopathy.    VASCULATURE: Normal caliber abdominal aorta. Moderate aortic calcification.    PELVIC ORGANS: Unremarkable. No free fluid.    MUSCULOSKELETAL: Degenerative changes both hips, greater on the right.      Impression    IMPRESSION:   1.  Evidence for small bowel obstruction with transition point in the left upper pelvis as detailed above. Please see noted images. No bowel wall thickening, significant mesenteric edema or free intraperitoneal air.    2.  Moderate fluid distention of the stomach.    3.  Indeterminant 1.7 cm right adrenal nodule. Management recommendations as detailed below.    REFERENCE:  Management of Incidental Adrenal Masses: A White Paper of the ACR Incidental Findings Committee. J Am Hussein Radiol 2017;14:2047-6140.    ? 1 cm and < 4 cm:     No prior imaging and no history of cancer:  1-2 cm: probably benign. Consider  12-month CT adrenal follow-up.             Discharge Medications   Discharge Medication List as of 6/7/2022 10:20 AM      CONTINUE these medications which have NOT CHANGED    Details   B Complex-C (SUPER B COMPLEX/VITAMIN C PO) Take 1 capsule by mouth daily as needed, Historical      diphenhydrAMINE-APAP, sleep, (TYLENOL PM EXTRA STRENGTH PO) Take 1 tablet by mouth nightly as needed, Historical      fluticasone (FLONASE) 50 MCG/ACT nasal spray Spray 1 spray into both nostrils daily, Disp-48 g, R-1, E-Prescribe      folic acid (FOLVITE) 400 MCG tablet Take 400 mcg by mouth daily, Historical      Glucosamine-Chondroit-Vit C-Mn (GLUCOSAMINE CHONDROITIN COMPLX) TABS Take 1 tablet by mouth daily as needed Occassionally, Historical      loratadine (CLARITIN) 10 MG tablet Take 10 mg by mouth as needed for allergies, Historical      Nutritional Supplements (ESTROVEN) TABS Take 1 tablet by mouth daily., Historical      omega 3 1000 MG CAPS Take 1 capsule by mouth daily, Historical      Probiotic Product (SOLUBLE FIBER/PROBIOTICS PO) Take 1 capsule by mouth daily align, Historical      Vitamin D (Cholecalciferol) 25 MCG (1000 UT) TABS Take 1,000 Units by mouth daily, Historical      Multiple Vitamin (MULTIVITAMINS) CAPS Take 1 tablet by mouth daily Vipul red, Historical      promethazine (PHENERGAN) 25 MG tablet Take 1 tablet (25 mg) by mouth every 6 hours as needed for nausea, Disp-12 tablet, R-0, Local Print           Allergies   Allergies   Allergen Reactions     Cats Itching     Seasonal Allergies      Eyes, throat sinus     Smoke. Itching     Watery eyes

## 2022-06-07 NOTE — PLAN OF CARE
PRIMARY DIAGNOSIS: SMALL BOWEL OBSTRUCTION    OUTPATIENT/OBSERVATION GOALS TO BE MET BEFORE DISCHARGE  1. Orthostatic performed: N/A    2. Tolerating PO fluid and/or antibiotics (if applicable): Yes    3. Nausea/Vomiting/Diarrhea symptoms improved: Yes    4. Pain status: Pain free.    5. Return to near baseline physical activity: Yes    Pt denies N/V or pain, tolerating full liquids at bedside.  Passing gas, reported 2 bms today.  Up independently.  IVF infusing.

## 2022-06-07 NOTE — PROGRESS NOTES
Pt. Discharged to home with . Discharge paperwork reviewed with patient and .   Pt. Had small bowel movement this morning and was able to eat a low fiber diet without any nausea/pain.     Neva Glynn RN

## 2022-06-07 NOTE — PLAN OF CARE
PRIMARY DIAGNOSIS: SBO    OUTPATIENT/OBSERVATION GOALS TO BE MET BEFORE DISCHARGE  1. Orthostatic performed: N/A    2. Tolerating PO fluid and/or antibiotics (if applicable): Yes    3. Nausea/Vomiting/Diarrhea symptoms improved: Yes    4. Pain status: Pain free.    5. Return to near baseline physical activity: Yes    No bms overnight, BS+, passing gas.  Denies nausea or vomiting, denies pain.  Up independently.        Discharge Planner Nurse   Safe discharge environment identified: Yes  Barriers to discharge: TBD       Entered by: Monika Davenport RN 06/07/2022 5:47 AM    Please review provider order for any additional goals.   Nurse to notify provider when observation goals have been met and patient is ready for discharge.

## 2022-06-12 PROCEDURE — 93010 ELECTROCARDIOGRAM REPORT: CPT | Performed by: EMERGENCY MEDICINE

## 2022-06-12 PROCEDURE — 93005 ELECTROCARDIOGRAM TRACING: CPT

## 2022-06-12 PROCEDURE — 99285 EMERGENCY DEPT VISIT HI MDM: CPT | Mod: 25 | Performed by: EMERGENCY MEDICINE

## 2022-06-12 PROCEDURE — 96360 HYDRATION IV INFUSION INIT: CPT | Mod: 59

## 2022-06-12 PROCEDURE — 99285 EMERGENCY DEPT VISIT HI MDM: CPT | Mod: 25

## 2022-06-13 ENCOUNTER — APPOINTMENT (OUTPATIENT)
Dept: MRI IMAGING | Facility: CLINIC | Age: 77
End: 2022-06-13
Attending: EMERGENCY MEDICINE
Payer: COMMERCIAL

## 2022-06-13 ENCOUNTER — HOSPITAL ENCOUNTER (EMERGENCY)
Facility: CLINIC | Age: 77
Discharge: HOME | End: 2022-06-13
Attending: EMERGENCY MEDICINE | Admitting: EMERGENCY MEDICINE
Payer: COMMERCIAL

## 2022-06-13 VITALS
SYSTOLIC BLOOD PRESSURE: 142 MMHG | BODY MASS INDEX: 33.74 KG/M2 | OXYGEN SATURATION: 98 % | HEART RATE: 77 BPM | DIASTOLIC BLOOD PRESSURE: 62 MMHG | RESPIRATION RATE: 16 BRPM | TEMPERATURE: 98.5 F | WEIGHT: 215 LBS | HEIGHT: 67 IN

## 2022-06-13 DIAGNOSIS — R11.2 NAUSEA AND VOMITING IN ADULT: ICD-10-CM

## 2022-06-13 DIAGNOSIS — R42 DIZZINESS: ICD-10-CM

## 2022-06-13 LAB
ALBUMIN SERPL-MCNC: 3.5 G/DL (ref 3.4–5)
ALP SERPL-CCNC: 81 U/L (ref 40–150)
ALT SERPL W P-5'-P-CCNC: 22 U/L (ref 0–50)
ANION GAP SERPL CALCULATED.3IONS-SCNC: 6 MMOL/L (ref 3–14)
AST SERPL W P-5'-P-CCNC: 19 U/L (ref 0–45)
ATRIAL RATE - MUSE: 73 BPM
BASOPHILS # BLD AUTO: 0 10E3/UL (ref 0–0.2)
BASOPHILS NFR BLD AUTO: 0 %
BILIRUB SERPL-MCNC: 0.3 MG/DL (ref 0.2–1.3)
BUN SERPL-MCNC: 10 MG/DL (ref 7–30)
CALCIUM SERPL-MCNC: 8.6 MG/DL (ref 8.5–10.1)
CHLORIDE BLD-SCNC: 109 MMOL/L (ref 94–109)
CO2 SERPL-SCNC: 26 MMOL/L (ref 20–32)
CREAT SERPL-MCNC: 0.58 MG/DL (ref 0.52–1.04)
DIASTOLIC BLOOD PRESSURE - MUSE: NORMAL MMHG
EOSINOPHIL # BLD AUTO: 0 10E3/UL (ref 0–0.7)
EOSINOPHIL NFR BLD AUTO: 0 %
ERYTHROCYTE [DISTWIDTH] IN BLOOD BY AUTOMATED COUNT: 12.8 % (ref 10–15)
GFR SERPL CREATININE-BSD FRML MDRD: >90 ML/MIN/1.73M2
GLUCOSE BLD-MCNC: 150 MG/DL (ref 70–99)
HCT VFR BLD AUTO: 44.7 % (ref 35–47)
HGB BLD-MCNC: 14.8 G/DL (ref 11.7–15.7)
IMM GRANULOCYTES # BLD: 0 10E3/UL
IMM GRANULOCYTES NFR BLD: 1 %
INTERPRETATION ECG - MUSE: NORMAL
LIPASE SERPL-CCNC: 106 U/L (ref 73–393)
LYMPHOCYTES # BLD AUTO: 0.6 10E3/UL (ref 0.8–5.3)
LYMPHOCYTES NFR BLD AUTO: 8 %
MCH RBC QN AUTO: 31.8 PG (ref 26.5–33)
MCHC RBC AUTO-ENTMCNC: 33.1 G/DL (ref 31.5–36.5)
MCV RBC AUTO: 96 FL (ref 78–100)
MONOCYTES # BLD AUTO: 0.2 10E3/UL (ref 0–1.3)
MONOCYTES NFR BLD AUTO: 2 %
NEUTROPHILS # BLD AUTO: 6.1 10E3/UL (ref 1.6–8.3)
NEUTROPHILS NFR BLD AUTO: 89 %
NRBC # BLD AUTO: 0 10E3/UL
NRBC BLD AUTO-RTO: 0 /100
P AXIS - MUSE: 15 DEGREES
PLATELET # BLD AUTO: 308 10E3/UL (ref 150–450)
POTASSIUM BLD-SCNC: 3.9 MMOL/L (ref 3.4–5.3)
PR INTERVAL - MUSE: 162 MS
PROT SERPL-MCNC: 6.8 G/DL (ref 6.8–8.8)
QRS DURATION - MUSE: 88 MS
QT - MUSE: 440 MS
QTC - MUSE: 484 MS
R AXIS - MUSE: -15 DEGREES
RBC # BLD AUTO: 4.66 10E6/UL (ref 3.8–5.2)
SODIUM SERPL-SCNC: 141 MMOL/L (ref 133–144)
SYSTOLIC BLOOD PRESSURE - MUSE: NORMAL MMHG
T AXIS - MUSE: 27 DEGREES
TROPONIN I SERPL HS-MCNC: 3 NG/L
VENTRICULAR RATE- MUSE: 73 BPM
WBC # BLD AUTO: 6.9 10E3/UL (ref 4–11)

## 2022-06-13 PROCEDURE — 70549 MR ANGIOGRAPH NECK W/O&W/DYE: CPT | Mod: 26 | Performed by: RADIOLOGY

## 2022-06-13 PROCEDURE — 70549 MR ANGIOGRAPH NECK W/O&W/DYE: CPT

## 2022-06-13 PROCEDURE — 255N000002 HC RX 255 OP 636: Performed by: EMERGENCY MEDICINE

## 2022-06-13 PROCEDURE — 999N000248 HC STATISTIC IV INSERT WITH US BY RN

## 2022-06-13 PROCEDURE — 250N000013 HC RX MED GY IP 250 OP 250 PS 637: Performed by: EMERGENCY MEDICINE

## 2022-06-13 PROCEDURE — 80053 COMPREHEN METABOLIC PANEL: CPT | Performed by: EMERGENCY MEDICINE

## 2022-06-13 PROCEDURE — 70553 MRI BRAIN STEM W/O & W/DYE: CPT | Mod: 26 | Performed by: RADIOLOGY

## 2022-06-13 PROCEDURE — 83690 ASSAY OF LIPASE: CPT | Performed by: EMERGENCY MEDICINE

## 2022-06-13 PROCEDURE — 36415 COLL VENOUS BLD VENIPUNCTURE: CPT | Performed by: EMERGENCY MEDICINE

## 2022-06-13 PROCEDURE — 70553 MRI BRAIN STEM W/O & W/DYE: CPT

## 2022-06-13 PROCEDURE — 85025 COMPLETE CBC W/AUTO DIFF WBC: CPT | Performed by: EMERGENCY MEDICINE

## 2022-06-13 PROCEDURE — 70544 MR ANGIOGRAPHY HEAD W/O DYE: CPT | Mod: 26 | Performed by: RADIOLOGY

## 2022-06-13 PROCEDURE — A9585 GADOBUTROL INJECTION: HCPCS | Performed by: EMERGENCY MEDICINE

## 2022-06-13 PROCEDURE — 258N000003 HC RX IP 258 OP 636: Performed by: EMERGENCY MEDICINE

## 2022-06-13 PROCEDURE — 70544 MR ANGIOGRAPHY HEAD W/O DYE: CPT | Mod: XS

## 2022-06-13 PROCEDURE — 84484 ASSAY OF TROPONIN QUANT: CPT | Performed by: EMERGENCY MEDICINE

## 2022-06-13 RX ORDER — ONDANSETRON 4 MG/1
4 TABLET, ORALLY DISINTEGRATING ORAL EVERY 8 HOURS PRN
Qty: 10 TABLET | Refills: 0 | Status: SHIPPED | OUTPATIENT
Start: 2022-06-13 | End: 2022-06-16

## 2022-06-13 RX ORDER — SODIUM CHLORIDE 9 MG/ML
INJECTION, SOLUTION INTRAVENOUS CONTINUOUS
Status: DISCONTINUED | OUTPATIENT
Start: 2022-06-13 | End: 2022-06-13 | Stop reason: HOSPADM

## 2022-06-13 RX ORDER — GADOBUTROL 604.72 MG/ML
10 INJECTION INTRAVENOUS ONCE
Status: COMPLETED | OUTPATIENT
Start: 2022-06-13 | End: 2022-06-13

## 2022-06-13 RX ORDER — ONDANSETRON 2 MG/ML
4 INJECTION INTRAMUSCULAR; INTRAVENOUS EVERY 30 MIN PRN
Status: DISCONTINUED | OUTPATIENT
Start: 2022-06-13 | End: 2022-06-13 | Stop reason: HOSPADM

## 2022-06-13 RX ORDER — MECLIZINE HYDROCHLORIDE 25 MG/1
50 TABLET ORAL ONCE
Status: COMPLETED | OUTPATIENT
Start: 2022-06-13 | End: 2022-06-13

## 2022-06-13 RX ADMIN — MECLIZINE HYDROCHLORIDE 50 MG: 25 TABLET ORAL at 01:30

## 2022-06-13 RX ADMIN — SODIUM CHLORIDE 1000 ML: 9 INJECTION, SOLUTION INTRAVENOUS at 01:49

## 2022-06-13 RX ADMIN — GADOBUTROL 10 ML: 604.72 INJECTION INTRAVENOUS at 03:10

## 2022-06-13 ASSESSMENT — ENCOUNTER SYMPTOMS
ABDOMINAL PAIN: 0
NECK STIFFNESS: 0
COLOR CHANGE: 0
EYE REDNESS: 0
WEAKNESS: 0
DIZZINESS: 1
NUMBNESS: 0
DIFFICULTY URINATING: 0
ARTHRALGIAS: 0
SHORTNESS OF BREATH: 0
CONFUSION: 0
NAUSEA: 1
FEVER: 0
VOMITING: 1
HEADACHES: 0
BRUISES/BLEEDS EASILY: 0

## 2022-06-13 NOTE — DISCHARGE INSTRUCTIONS
Thank you for your patience today.  Please follow-up with your regular doctor or in one of our clinics in the next 2-3 days for further evaluation and follow-up care.  Please call to schedule an appointment.  [facility] [phone] or [facility] [phone].     A referral has been placed for vestibular physical therapy.  They should call you to schedule an appointment.  If you continue to have symptoms we recommend going to physical therapy.  Please take Zofran 1 tablet as directed and as needed for nausea and vomiting.  If you continue to experience the dizziness/room spinning sensation you may take over-the-counter meclizine.  Please rest, drink plenty of fluids.  Please continue your own medications.      Please return to the ER if you develop high fever, persistent vomiting, dizziness, weakness, or any worsening of your current symptoms.  It was a pleasure taking care of you today.  We hope you feel better soon.

## 2022-06-13 NOTE — ED TRIAGE NOTES
Per pt. 5 days PTA was discharged for SBO per report. Comes in today with dizziness and nausea/vomiting since earlier today. Dizziness comes with movement of the head. Last BM today which was lose.

## 2022-06-13 NOTE — ED PROVIDER NOTES
"ED Provider Note  [facility]      History     Chief Complaint   Patient presents with     Nausea & Vomiting     The history is provided by the patient.     Toshia Caruso is a 76 year old female with a PMH of HTN, HANNA and SBO who presents to the ED today reporting dizziness, nausea and vomiting.  Patient presents to the ED with her , cuco, who drove the patient to the ED by car.  Patient reports feeling dizzy and disoriented this morning at 7 AM, she denies confusion.  She states that the dizziness has been persistent throughout the day, affecting her ability to walk.  Patient states that the dizziness occurs if she turns her head quickly, describing the dizziness as room spinning.  She denies experiencing this dizziness when lying still.  She denies headache, vision changes, weakness, paresthesias, or change in speech.  Patient states that she is experienced symptoms associated with this, stating that \"it feels like I am lying here but I feel like I am over there.\"  She reports nausea beginning at 8 AM, worsening severity throughout the day until she vomited 2 times tonight after dinner.  She reports taking a Claritin-D at 8 PM tonight, without relief.  She denies attempting to drink water since vomiting tonight. She denies having chronic medical conditions.  She reports a history of hypertension, and was once on medications but since been discontinued.  She denies a history of abdominal surgery.  She denies history of spinal surgery.  She denies any sick contacts.  She denies alcohol use, denies smoking.  Denies history of PE/DVT. She denies chest pain, shortness of breath, abdominal pain, or abdominal distention.  She reports using MiraLAX since being discharged from [facility] last week, she reports that her stools have been watery.  She denies dysuria or hematuria. Patient was recently admitted to [facility] from 6/5/2022 to 6/7/2022 for a small bowel " obstruction.,  She states she has been feeling unwell since discharge.    EXAM: CT ABDOMEN PELVIS W CONTRAST  LOCATION: [facility]  DATE/TIME: 6/5/2022 10:38 PM  INDICATION: Epigastric abdominal pain.  COMPARISON: Pelvic MRI 03/11/2016.                                 IMPRESSION:   1.  Evidence for small bowel obstruction with transition point in the left upper pelvis as detailed above. Please see noted images. No bowel wall thickening, significant mesenteric edema or free intraperitoneal air.  2.  Moderate fluid distention of the stomach.   3.  Indeterminant 1.7 cm right adrenal nodule. Management recommendations as detailed below.    Past Medical History  Past Medical History:   Diagnosis Date     Dysthymic disorder 8/4/2011     Obstructive sleep apnea 9/18/2012     Unspecified essential hypertension 9/13/2011     Past Surgical History:   Procedure Laterality Date     COLONOSCOPY N/A 4/28/2016    Procedure: COLONOSCOPY;  Surgeon: Victoria Mathis MD;  Location:  GI     COLONOSCOPY N/A 10/29/2019    Procedure: COLONOSCOPY, WITH POLYPECTOMY;  Surgeon: Perla Hay MD;  Location: [facility] OR     LAMINECTOMY CERIVCAL POSTERIOR THREE+ LEVELS  2002    C3-7     ondansetron (ZOFRAN ODT) 4 MG ODT tab  B Complex-C (SUPER B COMPLEX/VITAMIN C PO)  diphenhydrAMINE-APAP, sleep, (TYLENOL PM EXTRA STRENGTH PO)  fluticasone (FLONASE) 50 MCG/ACT nasal spray  folic acid (FOLVITE) 400 MCG tablet  Glucosamine-Chondroit-Vit C-Mn (GLUCOSAMINE CHONDROITIN COMPLX) TABS  loratadine (CLARITIN) 10 MG tablet  Multiple Vitamin (MULTIVITAMINS) CAPS  Nutritional Supplements (ESTROVEN) TABS  omega 3 1000 MG CAPS  Probiotic Product (SOLUBLE FIBER/PROBIOTICS PO)  promethazine (PHENERGAN) 25 MG tablet  Vitamin D (Cholecalciferol) 25 MCG (1000 UT) TABS      Allergies   Allergen Reactions     Cats Itching     Seasonal Allergies      Eyes, throat sinus     Smoke. Itching     Watery eyes     Family  "History  Family History   Problem Relation Age of Onset     Diabetes Mother      Thyroid Disease Mother 60        treated with radioactive iodine     Diabetes Father      C.A.D. Father         silent heart attacks     Glaucoma Father      Colon Cancer Father 70        treated with surgery, 6\" colon removed     Diabetes Maternal Grandfather      Thyroid Disease Sister 30     Social History   Social History     Tobacco Use     Smoking status: Never Smoker     Smokeless tobacco: Never Used   Substance Use Topics     Alcohol use: Yes     Comment: rare     Drug use: No      Past medical history, past surgical history, medications, allergies, family history, and social history were reviewed with the patient. No additional pertinent items.       Review of Systems   Constitutional: Negative for fever.   HENT: Negative for congestion.    Eyes: Negative for redness and visual disturbance.   Respiratory: Negative for shortness of breath.    Cardiovascular: Negative for chest pain.   Gastrointestinal: Positive for nausea and vomiting. Negative for abdominal pain.   Endocrine: Negative for polyuria.   Genitourinary: Negative for difficulty urinating.   Musculoskeletal: Negative for arthralgias and neck stiffness.   Skin: Negative for color change.   Allergic/Immunologic: Negative for immunocompromised state.   Neurological: Positive for dizziness. Negative for weakness, numbness and headaches.   Hematological: Does not bruise/bleed easily.   Psychiatric/Behavioral: Negative for confusion.     A complete review of systems was performed with pertinent positives and negatives noted in the HPI, and all other systems negative.    Physical Exam   BP: (!) 170/90  Pulse: 78  Temp: 97.8  F (36.6  C)  Resp: 18  Height: 170.2 cm (5' 7\")  Weight: 97.5 kg (215 lb)  SpO2: 96 %  Physical Exam  General: Afebrile, no acute distress, holding head still as movement worsens her symptoms   HEENT: Normocephalic, atraumatic, PERRL, EOMI, no nystagmus, " conjunctivae normal. MMM  Neck: non-tender, supple  Cardio: regular rate. regular rhythm   Resp: Normal work of breathing, no respiratory distress, lungs clear bilaterally, no wheezing, rhonchi, rales  Chest/Back: no visual signs of trauma, no CVA tenderness   Abdomen: soft, non distension, no tenderness, no peritoneal signs   Neuro: alert and fully oriented. CN II-XII  intact. Normal strength 5/5 and sensation in all extremities. No pronator drift, normal finger to nose. Gait deferred due to symptoms  MSK: no deformities. Normal range of motion  Integumentary/Skin: no rash visualized, normal color  Psych: normal affect, normal behavior    ED Course     1:04 AM  The patient was seen and examined by Karolina Aranda MD in Room ED29.     Procedures         EKG Interpretation:      Interpreted by Karolina Aranda MD  Time reviewed: 0122  Symptoms at time of EKG: dizziness   Rhythm: normal sinus rhythm  Rate: normal -73  Axis: normal  Ectopy: none  Conduction: normal  ST Segments/ T Waves: No acute ischemic change  Q Waves: none  Comparison to prior: T wave inversions on prior EKG are no longer present    Clinical Impression: normal sinus rhythm with no acute ischemic change       Results for orders placed or performed during the hospital encounter of 06/13/22   MR Brain w/o & w Contrast     Status: None    Narrative    EXAM: MR BRAIN W/O AND W CONTRAST, MRA NECK (CAROTIDS) W/O AND W CONTRAST, MRA BRAIN ([place] OF [place]) W/O CONTRAST  LOCATION: [facility]  DATE/TIME: 6/13/2022 2:34 AM    INDICATION: Dizziness, persistent/recurrent, cardiac or vascular cause suspected.  COMPARISON: None.  CONTRAST: 10 mL Gadavist.  TECHNIQUE:   1) Routine multiplanar multisequence head MRI without and with intravenous contrast.  2) 3D time-of-flight head MRA without intravenous contrast.  3) Neck MRA without and with IV contrast. Stenosis measurements made according to NASCET criteria  unless otherwise specified.    FINDINGS:  HEAD MRI:  INTRACRANIAL CONTENTS: No acute or subacute infarct. No mass, acute hemorrhage, or extra-axial fluid collections. Mild volume loss and presumed chronic small vessel ischemia. Normal position of the cerebellar tonsils. No pathologic contrast enhancement.    SELLA: No abnormality accounting for technique.    OSSEOUS STRUCTURES/SOFT TISSUES: Normal marrow signal. The major intracranial vascular flow voids are maintained.     ORBITS: No abnormality accounting for technique.     SINUSES/MASTOIDS: No paranasal sinus mucosal disease. No middle ear or mastoid effusion.     HEAD MRA:   ANTERIOR CIRCULATION: No stenosis/occlusion, aneurysm, or high flow vascular malformation.    POSTERIOR CIRCULATION: No stenosis/occlusion, aneurysm, or high flow vascular malformation. Dominant left and smaller right arteries supply a normal basilar artery. Fetal origin of the right posterior cerebral artery with intact posterior communicating   artery.    NECK MRA:   RIGHT CAROTID: No measurable stenosis or dissection.    LEFT CAROTID: No measurable stenosis or dissection.    VERTEBRAL ARTERIES: No focal stenosis or dissection. Dominant left and smaller right vertebral arteries.    AORTIC ARCH: Shared origin of the left common carotid and brachiocephalic arteries.       Impression    IMPRESSION:  HEAD MRI:   1.  No acute intracranial abnormality.    2.  Mild age-related change.    HEAD MRA:   1.  Normal MRA [place].    NECK MRA:  1.  Normal neck MRA.   MRA Brain ([place] of [place]) wo Contrast     Status: None    Narrative    EXAM: MR BRAIN W/O AND W CONTRAST, MRA NECK (CAROTIDS) W/O AND W CONTRAST, MRA BRAIN ([place] OF [place]) W/O CONTRAST  LOCATION: [facility]  DATE/TIME: 6/13/2022 2:34 AM    INDICATION: Dizziness, persistent/recurrent, cardiac or vascular cause suspected.  COMPARISON: None.  CONTRAST: 10 mL Gadavist.  TECHNIQUE:   1)  Routine multiplanar multisequence head MRI without and with intravenous contrast.  2) 3D time-of-flight head MRA without intravenous contrast.  3) Neck MRA without and with IV contrast. Stenosis measurements made according to NASCET criteria unless otherwise specified.    FINDINGS:  HEAD MRI:  INTRACRANIAL CONTENTS: No acute or subacute infarct. No mass, acute hemorrhage, or extra-axial fluid collections. Mild volume loss and presumed chronic small vessel ischemia. Normal position of the cerebellar tonsils. No pathologic contrast enhancement.    SELLA: No abnormality accounting for technique.    OSSEOUS STRUCTURES/SOFT TISSUES: Normal marrow signal. The major intracranial vascular flow voids are maintained.     ORBITS: No abnormality accounting for technique.     SINUSES/MASTOIDS: No paranasal sinus mucosal disease. No middle ear or mastoid effusion.     HEAD MRA:   ANTERIOR CIRCULATION: No stenosis/occlusion, aneurysm, or high flow vascular malformation.    POSTERIOR CIRCULATION: No stenosis/occlusion, aneurysm, or high flow vascular malformation. Dominant left and smaller right arteries supply a normal basilar artery. Fetal origin of the right posterior cerebral artery with intact posterior communicating   artery.    NECK MRA:   RIGHT CAROTID: No measurable stenosis or dissection.    LEFT CAROTID: No measurable stenosis or dissection.    VERTEBRAL ARTERIES: No focal stenosis or dissection. Dominant left and smaller right vertebral arteries.    AORTIC ARCH: Shared origin of the left common carotid and brachiocephalic arteries.       Impression    IMPRESSION:  HEAD MRI:   1.  No acute intracranial abnormality.    2.  Mild age-related change.    HEAD MRA:   1.  Normal MRA [place].    NECK MRA:  1.  Normal neck MRA.   MRA Neck (Carotids) wo & w Contrast     Status: None    Narrative    EXAM: MR BRAIN W/O AND W CONTRAST, MRA NECK (CAROTIDS) W/O AND W CONTRAST, MRA BRAIN ([place] OF [place]) W/O  CONTRAST  LOCATION: [facility]  DATE/TIME: 6/13/2022 2:34 AM    INDICATION: Dizziness, persistent/recurrent, cardiac or vascular cause suspected.  COMPARISON: None.  CONTRAST: 10 mL Gadavist.  TECHNIQUE:   1) Routine multiplanar multisequence head MRI without and with intravenous contrast.  2) 3D time-of-flight head MRA without intravenous contrast.  3) Neck MRA without and with IV contrast. Stenosis measurements made according to NASCET criteria unless otherwise specified.    FINDINGS:  HEAD MRI:  INTRACRANIAL CONTENTS: No acute or subacute infarct. No mass, acute hemorrhage, or extra-axial fluid collections. Mild volume loss and presumed chronic small vessel ischemia. Normal position of the cerebellar tonsils. No pathologic contrast enhancement.    SELLA: No abnormality accounting for technique.    OSSEOUS STRUCTURES/SOFT TISSUES: Normal marrow signal. The major intracranial vascular flow voids are maintained.     ORBITS: No abnormality accounting for technique.     SINUSES/MASTOIDS: No paranasal sinus mucosal disease. No middle ear or mastoid effusion.     HEAD MRA:   ANTERIOR CIRCULATION: No stenosis/occlusion, aneurysm, or high flow vascular malformation.    POSTERIOR CIRCULATION: No stenosis/occlusion, aneurysm, or high flow vascular malformation. Dominant left and smaller right arteries supply a normal basilar artery. Fetal origin of the right posterior cerebral artery with intact posterior communicating   artery.    NECK MRA:   RIGHT CAROTID: No measurable stenosis or dissection.    LEFT CAROTID: No measurable stenosis or dissection.    VERTEBRAL ARTERIES: No focal stenosis or dissection. Dominant left and smaller right vertebral arteries.    AORTIC ARCH: Shared origin of the left common carotid and brachiocephalic arteries.       Impression    IMPRESSION:  HEAD MRI:   1.  No acute intracranial abnormality.    2.  Mild age-related change.    HEAD MRA:   1.   Normal MRA [place].    NECK MRA:  1.  Normal neck MRA.   Comprehensive metabolic panel     Status: Abnormal   Result Value Ref Range    Sodium 141 133 - 144 mmol/L    Potassium 3.9 3.4 - 5.3 mmol/L    Chloride 109 94 - 109 mmol/L    Carbon Dioxide (CO2) 26 20 - 32 mmol/L    Anion Gap 6 3 - 14 mmol/L    Urea Nitrogen 10 7 - 30 mg/dL    Creatinine 0.58 0.52 - 1.04 mg/dL    Calcium 8.6 8.5 - 10.1 mg/dL    Glucose 150 (H) 70 - 99 mg/dL    Alkaline Phosphatase 81 40 - 150 U/L    AST 19 0 - 45 U/L    ALT 22 0 - 50 U/L    Protein Total 6.8 6.8 - 8.8 g/dL    Albumin 3.5 3.4 - 5.0 g/dL    Bilirubin Total 0.3 0.2 - 1.3 mg/dL    GFR Estimate >90 >60 mL/min/1.73m2   Lipase     Status: Normal   Result Value Ref Range    Lipase 106 73 - 393 U/L   Troponin I     Status: Normal   Result Value Ref Range    Troponin I High Sensitivity 3 <54 ng/L   CBC with platelets and differential     Status: Abnormal   Result Value Ref Range    WBC Count 6.9 4.0 - 11.0 10e3/uL    RBC Count 4.66 3.80 - 5.20 10e6/uL    Hemoglobin 14.8 11.7 - 15.7 g/dL    Hematocrit 44.7 35.0 - 47.0 %    MCV 96 78 - 100 fL    MCH 31.8 26.5 - 33.0 pg    MCHC 33.1 31.5 - 36.5 g/dL    RDW 12.8 10.0 - 15.0 %    Platelet Count 308 150 - 450 10e3/uL    % Neutrophils 89 %    % Lymphocytes 8 %    % Monocytes 2 %    % Eosinophils 0 %    % Basophils 0 %    % Immature Granulocytes 1 %    NRBCs per 100 WBC 0 <1 /100    Absolute Neutrophils 6.1 1.6 - 8.3 10e3/uL    Absolute Lymphocytes 0.6 (L) 0.8 - 5.3 10e3/uL    Absolute Monocytes 0.2 0.0 - 1.3 10e3/uL    Absolute Eosinophils 0.0 0.0 - 0.7 10e3/uL    Absolute Basophils 0.0 0.0 - 0.2 10e3/uL    Absolute Immature Granulocytes 0.0 <=0.4 10e3/uL    Absolute NRBCs 0.0 10e3/uL   EKG 12-lead, tracing only     Status: None (Preliminary result)   Result Value Ref Range    Systolic Blood Pressure  mmHg    Diastolic Blood Pressure  mmHg    Ventricular Rate 73 BPM    Atrial Rate 73 BPM    KY Interval 162 ms    QRS Duration 88 ms      ms    QTc 484 ms    P Axis 15 degrees    R AXIS -15 degrees    T Axis 27 degrees    Interpretation ECG       Sinus rhythm  Inferior infarct , age undetermined  Cannot rule out Anterior infarct , age undetermined  Abnormal ECG     CBC with platelets differential     Status: Abnormal    Narrative    The following orders were created for panel order CBC with platelets differential.  Procedure                               Abnormality         Status                     ---------                               -----------         ------                     CBC with platelets and d...[[id number]]  Abnormal            Final result                 Please view results for these tests on the individual orders.     Medications   0.9% sodium chloride BOLUS (0 mLs Intravenous Stopped 6/13/22 0302)     Followed by   sodium chloride 0.9% infusion ( Intravenous Not Given 6/13/22 0334)   ondansetron (ZOFRAN) injection 4 mg (has no administration in time range)   meclizine (ANTIVERT) tablet 50 mg (50 mg Oral Given 6/13/22 0130)   gadobutrol (GADAVIST) injection 10 mL (10 mLs Intravenous Given 6/13/22 0310)        Assessments & Plan (with Medical Decision Making)   Toshia Caruso is a 76 year old female with a PMH of HTN, HANNA and SBO who presents to the ED today reporting dizziness, nausea and vomiting.  Upon arrival patient is well-appearing, afebrile, mild distress secondary to dizziness, nausea, vomiting, does not want to move as this worsens her symptoms..  Patient hemodynamically stable, pressure initially elevated at 170/90, heart rate 78, oxygen 96% on room air.  Differential diagnosis includes but is not limited to vertigo versus viral illness versus infectious versus metabolic/electrolyte versus cardiac versus stroke.  I suspect patient's symptoms related to peripheral etiology however given patient's age and elevated blood pressure, I discussed with neurology and will obtain MRI and MRA of the brain to rule out  posterior stroke.  Patient and  are agreeable to this plan.  We will treat patient with IV hydration, Zofran, meclizine, and reevaluate.    I reviewed EKG which demonstrates normal sinus rhythm with a ventricular rate of 73 bpm with no acute ischemic change.  I reviewed comprehensive labs which are unremarkable with white blood cell count of 6.9, hemoglobin 14.8, no acute metabolic or electrolyte abnormality, troponin 3, lipase 106.  I reviewed MRI of the brain which demonstrates no acute intracranial abnormality, MRA of the head and neck both normal.  On reevaluation patient resting company, reports of complete improvement of her symptoms after IV hydration, and medications.  Patient able to ambulate without difficulty.  Patient feels comfortable with discharge home.  Recommend close outpatient follow-up with her primary care provider, will send patient home with Zofran, meclizine as needed if symptoms recur as well as referral to vestibular physical therapy.  Return precautions discussed if high fever, persistent vomiting, repeat dizziness, weakness, tingling, numbness, or any worsening symptoms.  Patient  understand agree with plan.    I have reviewed the nursing notes. I have reviewed the findings, diagnosis, plan and need for follow up with the patient.    New Prescriptions    ONDANSETRON (ZOFRAN ODT) 4 MG ODT TAB    Take 1 tablet (4 mg) by mouth every 8 hours as needed for nausea       Final diagnoses:   Dizziness   Nausea and vomiting in adult   ICarlos, am serving as a trained medical scribe to document services personally performed by Karolina Aranda MD, based on the provider's statements to me.      Karolian SUGGS MD, was physically present and have reviewed and verified the accuracy of this note documented by Carlos Maya.    --  Karolina Aranda MD  [facility] EMERGENCY DEPARTMENT  6/12/2022     Karolina Aranda MD  06/13/22 0518

## 2022-10-03 ENCOUNTER — TELEPHONE (OUTPATIENT)
Dept: SLEEP MEDICINE | Facility: CLINIC | Age: 77
End: 2022-10-03

## 2022-10-03 DIAGNOSIS — G47.33 OBSTRUCTIVE SLEEP APNEA: Primary | ICD-10-CM

## 2022-10-03 NOTE — TELEPHONE ENCOUNTER
Reason for Call:  Other order    Detailed comments: patient called and scheduled an appointment in December with  [facility].    Patient wants an order for a new cpap mask prior to this appointment.    Please contact patient.  Thank you.    Phone Number Patient can be reached at: Home number on file [phone] (home)    Best Time: any    Can we leave a detailed message on this number? YES    Call taken on 10/3/2022 at 12:21 PM by Kareen Mcgee

## 2022-10-03 NOTE — TELEPHONE ENCOUNTER
LOV: 09/11/2020    Patient requesting order for CPAP supplies prior to upcoming 12/15/2022 visit.     Order routed to provider to advise and approve. Patient to be notified once approval or denial has been made.     Ailyn Montgomery RN on 10/3/2022 at 1:00 PM

## 2022-10-04 NOTE — TELEPHONE ENCOUNTER
Call placed to patient to let her know that order has been signed and faxed to [facility].     Patient was also advised that Dr. Rosenberg would like a download from her CPAP and that [facility] should be able to get that and fax it to [phone].      Ailyn Montgomery RN on 10/4/2022 at 8:35 AM

## 2022-10-09 ASSESSMENT — ENCOUNTER SYMPTOMS
TREMORS: 0
DISTURBANCES IN COORDINATION: 0
SEIZURES: 0
TINGLING: 0
SPEECH CHANGE: 0
WEAKNESS: 0
PARALYSIS: 0
DIZZINESS: 0
NUMBNESS: 1
MEMORY LOSS: 0
HEADACHES: 0
LOSS OF CONSCIOUSNESS: 0

## 2022-10-13 ENCOUNTER — OFFICE VISIT (OUTPATIENT)
Dept: INTERNAL MEDICINE | Facility: CLINIC | Age: 77
End: 2022-10-13
Payer: COMMERCIAL

## 2022-10-13 VITALS
TEMPERATURE: 98.1 F | SYSTOLIC BLOOD PRESSURE: 147 MMHG | HEIGHT: 67 IN | DIASTOLIC BLOOD PRESSURE: 88 MMHG | OXYGEN SATURATION: 98 % | WEIGHT: 220.2 LBS | HEART RATE: 84 BPM | BODY MASS INDEX: 34.56 KG/M2

## 2022-10-13 DIAGNOSIS — Z86.73 HISTORY OF TIA (TRANSIENT ISCHEMIC ATTACK) AND STROKE: ICD-10-CM

## 2022-10-13 DIAGNOSIS — G47.33 OBSTRUCTIVE SLEEP APNEA: ICD-10-CM

## 2022-10-13 DIAGNOSIS — E78.00 PURE HYPERCHOLESTEROLEMIA: Primary | ICD-10-CM

## 2022-10-13 PROCEDURE — 99214 OFFICE O/P EST MOD 30 MIN: CPT | Performed by: HOSPITALIST

## 2022-10-13 RX ORDER — ATORVASTATIN CALCIUM 40 MG/1
40 TABLET, FILM COATED ORAL DAILY
Qty: 90 TABLET | Refills: 3 | Status: SHIPPED | OUTPATIENT
Start: 2022-10-13 | End: 2023-02-06

## 2022-10-13 RX ORDER — ATORVASTATIN CALCIUM 40 MG/1
TABLET, FILM COATED ORAL
COMMUNITY
Start: 2022-09-24 | End: 2022-10-13

## 2022-10-13 RX ORDER — ATORVASTATIN CALCIUM 40 MG/1
40 TABLET, FILM COATED ORAL DAILY
COMMUNITY
Start: 2022-09-23 | End: 2022-10-13 | Stop reason: ALTCHOICE

## 2022-10-13 ASSESSMENT — ENCOUNTER SYMPTOMS
SHORTNESS OF BREATH: 0
ABDOMINAL PAIN: 0
DYSURIA: 0
HEADACHES: 0
NUMBNESS: 1
CHILLS: 0
ARTHRALGIAS: 0
WEAKNESS: 0
SEIZURES: 0
FREQUENCY: 0
CONSTIPATION: 1
DIZZINESS: 0
FEVER: 0
TREMORS: 0

## 2022-10-13 NOTE — PATIENT INSTRUCTIONS
- Monitor blood pressure. If consistently over 140/85, let Dr. Musa know.   - Continue Aspirin regular daily, then decrease to baby (81mg) daily in December.   - Continue current medications.   - Keep appointment for mammogram.   - Continue monitoring for heart.   - Recommend Influenza vaccine, Shingles vaccine, Prevnar 20 and Bivalent COVID19 booster at pharmacy.    Follow up again in 4 months. Anticipate a recheck of cholesterol and liver enzymes before visit (would schedule lab near that time).

## 2022-10-13 NOTE — ASSESSMENT & PLAN NOTE
Patient was recently admitted and worked up for TIA at [facility] in [place]. MRI noted to have no stroke. Echo obtained with EF of 65%, EKG unremarkable. Was started on Aspirin at 325mg daily until December and then transition to Aspirin 81mg daily. Also started on Atorvastatin. Set up with cardiac monitoring for 3 weeks which she currently has on.   - Continue Aspirin 325mg daily until December and transition to 81mg daily.   - Continue on Atorvastatin 40mg daily.

## 2022-10-13 NOTE — NURSING NOTE
"Toshia Caruso is a 77 year old female patient that presents today in clinic for the following:    Chief Complaint   Patient presents with     RECHECK     Re-establish care.  Physical.     The patient's allergies and medications were reviewed as noted. A set of vitals were recorded as noted without incident: BP (!) 147/88 (BP Location: Right arm, Patient Position: Sitting, Cuff Size: Adult Large)   Pulse 84   Temp 98.1  F (36.7  C) (Oral)   Ht 1.702 m (5' 7.01\")   Wt 99.9 kg (220 lb 3.2 oz)   SpO2 98%   BMI 34.48 kg/m  . The patient does not have any other questions for the provider.    Valerio Gutierrez, EMT at 8:48 AM on 10/13/2022.  Primary care clinic: [phone]  "

## 2022-10-13 NOTE — PROGRESS NOTES
Assessment/Plan  Problem List Items Addressed This Visit        Respiratory    Obstructive sleep apnea     Encouraged continued CPAP use            Endocrine    Pure hypercholesterolemia - Primary     Continued on Atorvastatin 40mg daily.   Recheck Lipid and CMP in 3 months.          Relevant Medications    atorvastatin (LIPITOR) 40 MG tablet    Other Relevant Orders    Lipid panel reflex to direct LDL Fasting    Comprehensive metabolic panel       Other    History of TIA (transient ischemic attack) and stroke     Patient was recently admitted and worked up for TIA at [facility]. MRI noted to have no stroke. Echo obtained with EF of 65%, EKG unremarkable. Was started on Aspirin at 325mg daily until December and then transition to Aspirin 81mg daily. Also started on Atorvastatin. Set up with cardiac monitoring for 3 weeks which she currently has on.   - Continue Aspirin 325mg daily until December and transition to 81mg daily.   - Continue on Atorvastatin 40mg daily.             No results found for any visits on 10/13/22.    Health Maintenance Due   Topic Date Due     ADVANCE CARE PLANNING  Never done     DEPRESSION ACTION PLAN  Never done     HEPATITIS B IMMUNIZATION (1 of 3 - 3-dose series) Never done     ZOSTER IMMUNIZATION (2 of 3) 11/14/2012     MEDICARE ANNUAL WELLNESS VISIT  06/02/2019     PHQ-9  02/14/2020     MAMMO SCREENING  07/15/2020     FALL RISK ASSESSMENT  09/19/2020     COVID-19 Vaccine (2 - Booster for Mehreen series) 11/09/2021     INFLUENZA VACCINE (1) 09/01/2022     COLORECTAL CANCER SCREENING  10/29/2022         Subjective  Patient is here to establish care. Patient was recently admitted and worked up for TIA at [facility]. MRI noted to have no stroke. Echo obtained with EF of 65%, EKG unremarkable. Was started on Aspirin at 325mg daily until December and then transition to Aspirin 81mg daily. Also started on Atorvastatin.     No other changes at this time. Regarding health  maintenance, patient had an abnormal Pap in her 20s with removal of lesion at that time but has since been normal on Pap. Prefers not have any more Paps. Patient has family history of colon cancer and would like to repeat colonoscopy in 2024 as anticipated. She does have a mammogram scheduled. She was wondering about Shingles vaccine and Pneumococcal vaccine.     Continues on a CPAP for sleep apnea. Has constipation and uses metamucil and miralax which helps. Mentions having some allergy symptoms and has been using Coricidin D which she took this morning.      Review of Systems   Constitutional: Negative for chills and fever.   HENT: Positive for congestion.    Respiratory: Negative for shortness of breath.    Cardiovascular: Negative for chest pain and peripheral edema.   Gastrointestinal: Positive for constipation. Negative for abdominal pain.   Genitourinary: Negative for dysuria and frequency.   Musculoskeletal: Negative for arthralgias.   Neurological: Positive for numbness. Negative for dizziness, tremors, seizures, weakness and headaches.   Psychiatric/Behavioral: Negative for mood changes.       History  Past Medical History:   Diagnosis Date     Dysthymic disorder 8/4/2011     Obstructive sleep apnea 9/18/2012     Unspecified essential hypertension 9/13/2011       Past Surgical History:   Procedure Laterality Date     COLONOSCOPY N/A 4/28/2016    Procedure: COLONOSCOPY;  Surgeon: Victoria Mathis MD;  Location:  GI     COLONOSCOPY N/A 10/29/2019    Procedure: COLONOSCOPY, WITH POLYPECTOMY;  Surgeon: Perla Hay MD;  Location: [facility] OR     LAMINECTOMY CERIVCAL POSTERIOR THREE+ LEVELS  2002    C3-7       Family History   Problem Relation Age of Onset     Diabetes Mother      Thyroid Disease Mother 60        treated with radioactive iodine     Diabetes Father      C.A.D. Father         silent heart attacks     Glaucoma Father      Colon Cancer Father 70        treated with surgery,  "6\" colon removed     Diabetes Maternal Grandfather      Thyroid Disease Sister 30       Social History     Tobacco Use     Smoking status: Never     Smokeless tobacco: Never   Substance Use Topics     Alcohol use: Yes     Comment: rare        Objective  BP (!) 147/88 (BP Location: Right arm, Patient Position: Sitting, Cuff Size: Adult Large)   Pulse 84   Temp 98.1  F (36.7  C) (Oral)   Ht 1.702 m (5' 7.01\")   Wt 99.9 kg (220 lb 3.2 oz)   SpO2 98%   BMI 34.48 kg/m    Vitals taken by Kimo Musa MD    Physical Exam  Constitutional:       General: She is not in acute distress.     Appearance: She is not ill-appearing or toxic-appearing.   HENT:      Head: Normocephalic.   Eyes:      Conjunctiva/sclera: Conjunctivae normal.   Cardiovascular:      Rate and Rhythm: Regular rhythm.      Heart sounds: Normal heart sounds. No murmur heard.    No friction rub. No gallop.   Pulmonary:      Effort: Pulmonary effort is normal. No respiratory distress.      Breath sounds: Normal breath sounds. No wheezing, rhonchi or rales.   Abdominal:      General: Bowel sounds are normal. There is no distension.      Palpations: Abdomen is soft.      Tenderness: There is no abdominal tenderness.   Musculoskeletal:      Right lower leg: No edema.      Left lower leg: No edema.   Skin:     General: Skin is warm and dry.      Findings: No rash.   Neurological:      Mental Status: She is alert.   Psychiatric:         Mood and Affect: Mood normal.         Thought Content: Thought content normal.         I spent greater than 50% of the 20 minutes in the visit coordinating care regarding patient's recommendations towards establishing care    Return in about 4 months (around 2/13/2023).      Kimo Musa MD  [facility] INTERNAL MEDICINE [place]    Answers for HPI/ROS submitted by the patient on 10/9/2022  General Symptoms: No  Skin Symptoms: No  HENT Symptoms: No  EYE SYMPTOMS: No  HEART SYMPTOMS: No  LUNG " SYMPTOMS: No  INTESTINAL SYMPTOMS: No  URINARY SYMPTOMS: No  GYNECOLOGIC SYMPTOMS: No  BREAST SYMPTOMS: No  SKELETAL SYMPTOMS: No  BLOOD SYMPTOMS: No  NERVOUS SYSTEM SYMPTOMS: Yes  MENTAL HEALTH SYMPTOMS: No  Trouble with coordination: No  Fainting or black-out spells: No  Memory loss: No  Speech problems: No  Tingling: No  Difficulty walking: No  Paralysis: No

## 2022-10-16 ENCOUNTER — HEALTH MAINTENANCE LETTER (OUTPATIENT)
Age: 77
End: 2022-10-16

## 2022-10-18 ENCOUNTER — ANCILLARY PROCEDURE (OUTPATIENT)
Dept: MAMMOGRAPHY | Facility: CLINIC | Age: 77
End: 2022-10-18
Attending: INTERNAL MEDICINE
Payer: COMMERCIAL

## 2022-10-18 DIAGNOSIS — Z12.31 VISIT FOR SCREENING MAMMOGRAM: ICD-10-CM

## 2022-10-18 PROCEDURE — 77067 SCR MAMMO BI INCL CAD: CPT | Mod: GC | Performed by: RADIOLOGY

## 2022-10-18 PROCEDURE — 77063 BREAST TOMOSYNTHESIS BI: CPT | Mod: GC | Performed by: RADIOLOGY

## 2022-12-14 ASSESSMENT — SLEEP AND FATIGUE QUESTIONNAIRES
HOW LIKELY ARE YOU TO NOD OFF OR FALL ASLEEP WHILE SITTING QUIETLY AFTER LUNCH WITHOUT ALCOHOL: WOULD NEVER DOZE
HOW LIKELY ARE YOU TO NOD OFF OR FALL ASLEEP WHILE SITTING AND TALKING TO SOMEONE: WOULD NEVER DOZE
HOW LIKELY ARE YOU TO NOD OFF OR FALL ASLEEP IN A CAR, WHILE STOPPED FOR A FEW MINUTES IN TRAFFIC: WOULD NEVER DOZE
HOW LIKELY ARE YOU TO NOD OFF OR FALL ASLEEP WHILE WATCHING TV: SLIGHT CHANCE OF DOZING
HOW LIKELY ARE YOU TO NOD OFF OR FALL ASLEEP WHILE LYING DOWN TO REST IN THE AFTERNOON WHEN CIRCUMSTANCES PERMIT: MODERATE CHANCE OF DOZING
HOW LIKELY ARE YOU TO NOD OFF OR FALL ASLEEP WHEN YOU ARE A PASSENGER IN A CAR FOR AN HOUR WITHOUT A BREAK: SLIGHT CHANCE OF DOZING
HOW LIKELY ARE YOU TO NOD OFF OR FALL ASLEEP WHILE SITTING INACTIVE IN A PUBLIC PLACE: WOULD NEVER DOZE
HOW LIKELY ARE YOU TO NOD OFF OR FALL ASLEEP WHILE SITTING AND READING: SLIGHT CHANCE OF DOZING

## 2022-12-15 ENCOUNTER — OFFICE VISIT (OUTPATIENT)
Dept: SLEEP MEDICINE | Facility: CLINIC | Age: 77
End: 2022-12-15
Payer: COMMERCIAL

## 2022-12-15 VITALS
HEART RATE: 87 BPM | SYSTOLIC BLOOD PRESSURE: 173 MMHG | BODY MASS INDEX: 34.21 KG/M2 | DIASTOLIC BLOOD PRESSURE: 83 MMHG | HEIGHT: 67 IN | OXYGEN SATURATION: 96 % | WEIGHT: 218 LBS

## 2022-12-15 DIAGNOSIS — G47.33 OBSTRUCTIVE SLEEP APNEA: Primary | ICD-10-CM

## 2022-12-15 PROCEDURE — 99214 OFFICE O/P EST MOD 30 MIN: CPT | Performed by: INTERNAL MEDICINE

## 2022-12-15 RX ORDER — ASPIRIN 300 MG/1
81 SUPPOSITORY RECTAL
COMMUNITY
Start: 2022-09-29 | End: 2023-02-13 | Stop reason: ALTCHOICE

## 2022-12-15 RX ORDER — ASPIRIN 81 MG/1
324 TABLET, CHEWABLE ORAL
COMMUNITY
Start: 2022-09-24 | End: 2022-12-23

## 2022-12-15 NOTE — NURSING NOTE
Patient is changing DME provider to [facility], faxed sleep study, clinical notes and perscribtion to [facility].    Patient to follow up in 1 year, sent a delayed "[employer], [place]" message for a  1 year follow up appointment reminder.    Konstantin Avendaño CMA

## 2022-12-15 NOTE — PROGRESS NOTES
Katelyn complaint: Follow-up obstructive sleep apnea    LOV Sleep 9/11/2020    History of Present Illness: 77-year-old female with history of hypertension and obstructive sleep apnea presents for routine follow-up.  She needs to be seen yearly in order to have her CPAP supplies covered by her insurance.  She reports that she uses it every time she sleeps in bed.  She does have sometimes when she cannot stay asleep and so she will sleep in the recliner outside the bedroom.  She does not routinely take sleep aids but on occasion if she is got muscle pains related to increased exercise she will take acetaminophen with diphenhydramine.  She is not routinely taking naps during the day and denies excessive daytime sleepiness.     She does not drink alcohol.  She currently uses a nasal pillow style mask.  She also uses a chinstrap.  She has been told by her  that sometimes she makes noises through her mouth.  She did try fullface masks in the past but had issues with them affecting her lower jaw teeth position as well as increased leak.  She denies problems with nasal congestion.  In fact she thinks that the humidified air is very helpful for her nasal breathing.  She drinks about 24 ounces of caffeinated coffee a day.    No problems with restless legs, sleepwalking, sleep talking or dream enactment behavior.  Weight has been overall stable.      She is requesting a change to her DME [facility].  She does use a SoClean.    [place] Sleepiness Scale  Total score - [place]: 5 (12/14/2022  9:41 PM)   (Less than 10 normal)    Insomnia Severity Scale  TEJ Total Score: 8  (normal 0-7, mild 8-14, moderate 15-21, severe 22-28)    Past Medical History:   Diagnosis Date     Dysthymic disorder 08/04/2011     Mixed hyperlipidemia      Obstructive sleep apnea 09/18/2012     TIA (transient ischemic attack)      Unspecified essential hypertension 09/13/2011       Allergies   Allergen Reactions     Smoke. Itching and  Anaphylaxis     Watery eyes     Cat Hair Extract Hives     Cats Itching     Seasonal Allergies      Eyes, throat sinus       Current Outpatient Medications   Medication     aspirin (ASA) 300 MG suppository     aspirin (ASA) 81 MG chewable tablet     atorvastatin (LIPITOR) 40 MG tablet     B Complex-C (SUPER B COMPLEX/VITAMIN C PO)     diphenhydrAMINE-APAP, sleep, (TYLENOL PM EXTRA STRENGTH PO)     fluticasone (FLONASE) 50 MCG/ACT nasal spray     folic acid (FOLVITE) 400 MCG tablet     Glucosamine-Chondroit-Vit C-Mn (GLUCOSAMINE CHONDROITIN COMPLX) TABS     loratadine (CLARITIN) 10 MG tablet     Multiple Vitamin (MULTIVITAMINS) CAPS     Nutritional Supplements (ESTROVEN) TABS     omega 3 1000 MG CAPS     Probiotic Product (SOLUBLE FIBER/PROBIOTICS PO)     Vitamin D (Cholecalciferol) 25 MCG (1000 UT) TABS     No current facility-administered medications for this visit.       Social History     Socioeconomic History     Marital status:      Spouse name: Not on file     Number of children: Not on file     Years of education: Not on file     Highest education level: Not on file   Occupational History     Not on file   Tobacco Use     Smoking status: Never     Smokeless tobacco: Never   Substance and Sexual Activity     Alcohol use: Yes     Comment: rare     Drug use: No     Sexual activity: Yes     Partners: Male   Other Topics Concern     Parent/sibling w/ CABG, MI or angioplasty before 65F 55M? Not Asked      Service Not Asked     Blood Transfusions Not Asked     Caffeine Concern Not Asked     Occupational Exposure Not Asked     Hobby Hazards Not Asked     Sleep Concern Not Asked     Stress Concern Not Asked     Weight Concern Not Asked     Special Diet Not Asked     Back Care Not Asked     Exercise Yes     Comment: goes to a gym and does strength 2/wk and cardiovascular 3/wk     Bike Helmet Not Asked     Seat Belt Not Asked     Self-Exams Not Asked   Social History Narrative     Not on file     Social  "Determinants of Health     Financial Resource Strain: Not on file   Food Insecurity: Not on file   Transportation Needs: Not on file   Physical Activity: Not on file   Stress: Not on file   Social Connections: Not on file   Intimate Partner Violence: Not on file   Housing Stability: Not on file       Family History   Problem Relation Age of Onset     Diabetes Mother      Thyroid Disease Mother 60        treated with radioactive iodine     Diabetes Father      C.A.D. Father         silent heart attacks     Glaucoma Father      Colon Cancer Father 70        treated with surgery, 6\" colon removed     Diabetes Maternal Grandfather      Thyroid Disease Sister 30           EXAM:  BP (!) 151/70   Pulse 87   Ht 1.702 m (5' 7\")   Wt 98.9 kg (218 lb)   SpO2 96%   BMI 34.14 kg/m    GENERAL: Alert and no distress  EYES: Eyes grossly normal to inspection.  No discharge or erythema, or obvious scleral/conjunctival abnormalities.  Oropharynx clear without exudates, tonsillar stage I, elevated soft palate  RESP: No audible wheeze, cough, or visible cyanosis.  No visible retractions or increased work of breathing.    SKIN: Visible skin clear. No significant rash, abnormal pigmentation or lesions.  NEURO: Cranial nerves grossly intact.  Mentation and speech appropriate for age.  PSYCH: Mentation appears normal, affect normal, judgement and insight intact, normal speech and appearance well-groomed.       PSG Split [facility] 11/29/2015  Weight 204  lbs BMI 31  AHI 17, RDI 59, No REM on diagnostic Lowest O2 SAT 89  CPAP 7    Initial HANNA Dx [place] 2000s    ResMed Air Sense 10 auto PAP download from 11/15/2022 to 12/14/2022 reviewed:  Per cent of days used greater than 4 Hours 97% (minimum goal greater than 70%)  Average use on days used: 7 hours 27 min  Settings: Min EPAP 9 cmH2O    Max EPAP 12 cmH2O  Pressures delivered 90/95th percentile for pressure 12 cmH2O  Average AHI 2.8 events per hour (goal less than 5)  Leak " acceptable    TSH   Date Value Ref Range Status   08/14/2019 1.51 0.40 - 4.00 mU/L Final       ASSESSMENT:  77-year-old female with history of hypertension, obesity, at least moderate obstructive sleep apnea identified on split-night PSG.  She is meeting compliance goals and getting good clinical benefit with the use of PAP therapy.  There may be some intermittent snoring through the mask.  Ongoing treatment of obstructive sleep apnea is medically indicated.    PLAN:  Orders generated for updated CPAP supplies as well as a pressure change.  We will increase the upper limit pressure to see if that helps with intermittent snoring.  Orders will be sent to [facility].  Patient was cautioned about the use of so clean particularly if she gets a new machine and may avoid the warranty.  Avoid weight gain, contact me if new sleep issues arise.  Follow-up in 1 year.      32 minutes spent on the date of the encounter doing chart review, history and exam, documentation and further activities per the note    Sammie Weiner M.D.  Pulmonary/Critical Care/Sleep Medicine    [facility]   Floor 1, [address]   [address] [place], [place] [address]. [place] [address]   Appointments: [phone]    The above note was dictated using voice recognition software and may include typographical errors. Please contact the author for any clarifications.

## 2022-12-15 NOTE — PATIENT INSTRUCTIONS
For general sleep health questions:   http://sleepeducation.org    For tips about PAP and COVID-19:  https://www.thoracic.org/patients/patient-resources/resources/covid-19-and-home-pap-therapy.pdf    For general info about COVID-19 including vaccines:  https://Vook.org/covid19      Continue PAP therapy every night, for all hours that you are sleeping (including naps.)  As always, try to get at least 8 hours of sleep or more each day, keep a regular sleep schedule, and avoid sleep deprivation. Avoid alcohol.  Reasons that you might need a change to your pressure therapy would be weight gain or loss, waking having inadvertently removed your PAP overnight, having previously felt refreshed by sleep with CPAP use and now waking un-refreshed, and return of daytime sleepiness. Also, the development of new medical problems  (such as heart failure, stroke, medications such as narcotics) can sometimes affect breathing at night and change your PAP therapy needs.  Please bring PAP with you if you are hospitalized.  If anticipating surgery be sure to discuss with your surgeon that you have sleep apnea and use PAP therapy.    Maintain your equipment as recommended which includes routine cleaning and replacement of supplies.      Call DME for any questions regarding supplies or maintenance.    [facility], [facility] [phone]    Do not drive on engage in potentially dangerous activities if feeling sleepy.  Please follow up in sleep clinic again in 12 months.        Tips for your PAP use-    Mask fitting tips  Mask fitting exercise:    To improve your mask seal and your mobility at night, put mask on and secure in place.  Lie down in bed with full pressure and roll to one side, adjust headgear while in that position to eliminate any leaks. Repeat process rolling to other side.     The mask seal does not have to be perfect:   CPAP machines are designed to make up for small  leaks. However, you will not tolerate leaks blowing in your eyes so you will need to adjust.   Any leak should only be near or at the bottom of the mask.  We expect your mask to leak slightly at night.    Do not over-tighten the headgear straps, tighter IS NOT better, we expect minimal leak.    First try re-positioning the mask or headgear before tightening the headgear straps.  Mask leaks are expected due to changing sleeping positions. Try pulling the mask away from your skin allowing the cushion to re-inflate will minimize the leak.  If you struggle for a good fit, try turning the CPAP off and then readjust the mask by pulling it away from your face and then turning back on the CPAP.        Humidifier tips  Humidifiers can be adjusted to increase or decrease the amount of moisture according to your comfort level. You may need to adjust this frequently at first, but then might only change it with seasonal weather changes.     Try INCREASING the humidity if:  You experience a dry, irritated nasal passage or throat.  You have a runny, drippy nose or sneezing fits after using CPAP.  You experience nasal congestion during or after CPAP use.    Try DECREASING the humidity if:  You have excessive condensation or  rain out  in the tubing or mask.  Otherwise keep the tubing warm during the night by running it underneath the blankets or pillow.      Clinic visit after initial PAP set-up   Bring your equipment with you to your 5-8 week follow up clinic visit.  We will be extracting your data from the machine if not available from the cloud based modem.        Travel  Always take your equipment with you when you travel.  If you fly with your equipment bring it on with you as a carry on.  Medical equipment does not count as a carry on.    If you travel international the machines take 110-240v.  The only adapter needed is the adapter that will fit into the receptacle (outlet).    You may also want to bring an extension cord as  many hotel rooms have limited outlets at the bedside.  Do not travel with water in your humidifier chamber.     Cleaning and Maintenance Guidelines    Equipment Frequency Cleaning Method   Mask First Day    Daily      Weekly Soak mask in hot soapy water for 30 minutes, rinse and air dry.  Wipe nasal cushion with a hot soapy (Ivory, baby shampoo) cloth and rinse.  Baby wipes may also be used.  Do not use anti-bacterial soaps,Daysi  liquid soap, rubbing alcohol, bleach or ammonia.  Wash frame in hot soapy water (Ivory, baby shampoo) rinse and let air dry   Headgear Biweekly Wash in hot soapy water, rinse and air dry   Reusable Gray Filter Weekly Wash in hot soapy water, rinse, put in towel squeeze moisture out, let air dry   Disposable White Filter Check Weekly Replace when brown or gray in color; at least every 2 to 3 months   Humidifier Chamber Daily    Weekly Empty distilled water from humidifier and let air dry    Hand wash in hot soapy water, rinse and air dry   Tubing Weekly Wash in hot soapy water, rinse and let air dry   Mask, Tubing and Humidifier Chamber As needed Disinfect: Soak in 1 part distilled white vinegar to 3 parts hot water for 30 minutes, rinse well and air dry  Not the material headgear        MASK AND SUPPLY REORDERING and EQUIPMENT NEEDS through your DME and per your insurance  Reminder: Most insurance companies will allow for a new mask, headgear, tubing, and reusable gray filter every six months.  Disposable white ultra-fine filters are covered monthly.      HOME AND SAFETY INSTRUCTIONS  Do not use frayed or cracked electrical cords, multi plug adaptors, or switched receptacles  Do not immerse electrical equipment into water  Assure that electrical cords do not become a tripping hazard   Your BMI is Body mass index is 34.14 kg/m .    What is BMI?  Body mass index (BMI) is one way to tell whether you are at a healthy weight, overweight, or obese. It measures your weight in relation to your  height.  A BMI of 18.5 to 24.9 is in the healthy range. A person with a BMI of 25 to 29.9 is considered overweight, and someone with a BMI of 30 or greater is considered obese.  Another way to find out if you are at risk for health problems caused by overweight and obesity is to measure your waist. If you are a woman and your waist is more than 35 inches, or if you are a man and your waist is more than 40 inches, your risk of disease may be higher.  More than two-thirds of American adults are considered overweight or obese. Being overweight or obese increases the risk for further weight gain.  Excess weight may lead to heart disease and diabetes. Creating and following plans for healthy eating and physical activity may help you improve your health.    Methods for maintaining or losing weight.  Weight control is part of healthy lifestyle and includes exercise, emotional health, and healthy eating habits.  Careful eating habits lifelong is the mainstay of weight control.  Though there are significant health benefits from weight loss, long-term weight loss with diet alone may be very difficult to achieve- studies show long-term success with dietary management in less than 10% of people. Attaining a healthy weight may be especially difficult to achieve in those with severe obesity. In some cases, medications, devices and surgical management might be considered.    What can you do?  If you are overweight or obese and are interested in methods for weight loss, you should discuss this with your provider. In addition, we recommend that you review healthy life styles and methods for weight loss available through the National Institutes of Health patient information sites:   http://win.niddk.nih.gov/publications/index.htm

## 2023-02-06 ENCOUNTER — LAB (OUTPATIENT)
Dept: LAB | Facility: CLINIC | Age: 78
End: 2023-02-06
Payer: COMMERCIAL

## 2023-02-06 DIAGNOSIS — E78.00 PURE HYPERCHOLESTEROLEMIA: ICD-10-CM

## 2023-02-06 DIAGNOSIS — R74.01 TRANSAMINITIS: Primary | ICD-10-CM

## 2023-02-06 LAB
ALBUMIN SERPL BCG-MCNC: 4.2 G/DL (ref 3.5–5.2)
ALP SERPL-CCNC: 285 U/L (ref 35–104)
ALT SERPL W P-5'-P-CCNC: 77 U/L (ref 10–35)
ANION GAP SERPL CALCULATED.3IONS-SCNC: 11 MMOL/L (ref 7–15)
AST SERPL W P-5'-P-CCNC: 43 U/L (ref 10–35)
BILIRUB SERPL-MCNC: 0.4 MG/DL
BUN SERPL-MCNC: 11.9 MG/DL (ref 8–23)
CALCIUM SERPL-MCNC: 9.3 MG/DL (ref 8.8–10.2)
CHLORIDE SERPL-SCNC: 103 MMOL/L (ref 98–107)
CHOLEST SERPL-MCNC: 178 MG/DL
CREAT SERPL-MCNC: 0.82 MG/DL (ref 0.51–0.95)
DEPRECATED HCO3 PLAS-SCNC: 27 MMOL/L (ref 22–29)
GFR SERPL CREATININE-BSD FRML MDRD: 74 ML/MIN/1.73M2
GLUCOSE SERPL-MCNC: 112 MG/DL (ref 70–99)
HDLC SERPL-MCNC: 89 MG/DL
LDLC SERPL CALC-MCNC: 76 MG/DL
NONHDLC SERPL-MCNC: 89 MG/DL
POTASSIUM SERPL-SCNC: 4.1 MMOL/L (ref 3.4–5.3)
PROT SERPL-MCNC: 6.9 G/DL (ref 6.4–8.3)
SODIUM SERPL-SCNC: 141 MMOL/L (ref 136–145)
TRIGL SERPL-MCNC: 64 MG/DL

## 2023-02-06 PROCEDURE — 80053 COMPREHEN METABOLIC PANEL: CPT | Performed by: PATHOLOGY

## 2023-02-06 PROCEDURE — 36415 COLL VENOUS BLD VENIPUNCTURE: CPT | Performed by: PATHOLOGY

## 2023-02-06 PROCEDURE — 80061 LIPID PANEL: CPT | Performed by: PATHOLOGY

## 2023-02-06 RX ORDER — ATORVASTATIN CALCIUM 20 MG/1
20 TABLET, FILM COATED ORAL DAILY
Qty: 90 TABLET | Refills: 1 | Status: SHIPPED | OUTPATIENT
Start: 2023-02-06 | End: 2023-03-07 | Stop reason: ALTCHOICE

## 2023-02-13 ENCOUNTER — OFFICE VISIT (OUTPATIENT)
Dept: INTERNAL MEDICINE | Facility: CLINIC | Age: 78
End: 2023-02-13
Payer: COMMERCIAL

## 2023-02-13 VITALS
DIASTOLIC BLOOD PRESSURE: 57 MMHG | OXYGEN SATURATION: 97 % | HEIGHT: 67 IN | BODY MASS INDEX: 33.87 KG/M2 | HEART RATE: 92 BPM | WEIGHT: 215.8 LBS | SYSTOLIC BLOOD PRESSURE: 107 MMHG

## 2023-02-13 DIAGNOSIS — G89.29 CHRONIC PAIN OF BOTH KNEES: ICD-10-CM

## 2023-02-13 DIAGNOSIS — M25.561 CHRONIC PAIN OF BOTH KNEES: ICD-10-CM

## 2023-02-13 DIAGNOSIS — M25.562 CHRONIC PAIN OF BOTH KNEES: ICD-10-CM

## 2023-02-13 DIAGNOSIS — K59.01 SLOW TRANSIT CONSTIPATION: Primary | ICD-10-CM

## 2023-02-13 DIAGNOSIS — R74.01 TRANSAMINITIS: ICD-10-CM

## 2023-02-13 PROCEDURE — 99214 OFFICE O/P EST MOD 30 MIN: CPT | Performed by: HOSPITALIST

## 2023-02-13 ASSESSMENT — ANXIETY QUESTIONNAIRES
GAD7 TOTAL SCORE: 1
GAD7 TOTAL SCORE: 1
7. FEELING AFRAID AS IF SOMETHING AWFUL MIGHT HAPPEN: NOT AT ALL
6. BECOMING EASILY ANNOYED OR IRRITABLE: NOT AT ALL
1. FEELING NERVOUS, ANXIOUS, OR ON EDGE: SEVERAL DAYS
3. WORRYING TOO MUCH ABOUT DIFFERENT THINGS: NOT AT ALL
IF YOU CHECKED OFF ANY PROBLEMS ON THIS QUESTIONNAIRE, HOW DIFFICULT HAVE THESE PROBLEMS MADE IT FOR YOU TO DO YOUR WORK, TAKE CARE OF THINGS AT HOME, OR GET ALONG WITH OTHER PEOPLE: NOT DIFFICULT AT ALL
5. BEING SO RESTLESS THAT IT IS HARD TO SIT STILL: NOT AT ALL
2. NOT BEING ABLE TO STOP OR CONTROL WORRYING: NOT AT ALL

## 2023-02-13 ASSESSMENT — ENCOUNTER SYMPTOMS
CONSTIPATION: 1
DIARRHEA: 1
FEVER: 0
MYALGIAS: 0
PARESTHESIAS: 0
LIGHT-HEADEDNESS: 0
HEMATOCHEZIA: 0
ABDOMINAL PAIN: 1
DYSURIA: 0
FREQUENCY: 0
SHORTNESS OF BREATH: 0
CHILLS: 0
BACK PAIN: 1
ARTHRALGIAS: 1

## 2023-02-13 ASSESSMENT — PATIENT HEALTH QUESTIONNAIRE - PHQ9
5. POOR APPETITE OR OVEREATING: NOT AT ALL
SUM OF ALL RESPONSES TO PHQ QUESTIONS 1-9: 4

## 2023-02-13 NOTE — PROGRESS NOTES
Assessment/Plan  Problem List Items Addressed This Visit        Nervous and Auditory    Chronic pain of both knees     Appears to have medial collateral and ACL laxity on left knee exam. Also has tenderness along bilateral distant hamstrings of both knees, likely contributing to knee strain.   - Consider Physical therapy for your knees. Patient to message Dr. Musa if you would like physical therapy.   - If Liver enzymes are better, may consider Selenium at 100 mcg daily.          Relevant Medications    aspirin (ASA) 81 MG EC tablet       Digestive    Constipation - Primary     - Senna daily as needed OTC. Continue on Prune juice. May also use Miralax as needed if feel you are getting implacted.   - Check TSH lab next month.         Relevant Orders    TSH with free T4 reflex       Other    Transaminitis     Has mild transaminitis likely from Atorvastatin.   - Continued on lower dose of Atorvastatin 20mg daily.   - Repeat Liver enzymes next month. If still abnormal, may consider changing to Rosuvastatin.             No results found for any visits on 02/13/23.    Health Maintenance Due   Topic Date Due     ADVANCE CARE PLANNING  Never done     DEPRESSION ACTION PLAN  Never done     ZOSTER IMMUNIZATION (2 of 3) 11/14/2012     MEDICARE ANNUAL WELLNESS VISIT  06/02/2019     PHQ-9  02/14/2020     FALL RISK ASSESSMENT  09/19/2020     COVID-19 Vaccine (2 - Booster for Mehreen series) 11/09/2021     COLORECTAL CANCER SCREENING  10/29/2022     Subjective  Patient has several questions today. Also wants to talk about constipation and intermittent diarrhea. Mentions some back aches in the morning, maybe having some poor sleep.     First the constipation and diarrhea. She mentions she's had compacted bowels 4-5 years ago and about 1 year ago. Mentions having patterns of constipation build up and then will have lower abdominal cramping to leads to loose/soft BMs. This happens about once a month. Mentions she was on a cruise  "recently and had constipation. She took prune juice helped.     Mentions she has continued knee pains, more in her left knee. Wondering if Selenium would help. Mentions she has a friend that took Selenium and helped her. She has tried Chondroitin which has not helped much. She does take a multivitamin daily.     Lastly as smooth finger tips. Her father also had soft finger tips as well. She used to make fun of him but now she's developed smooth finger tips.       Review of Systems   Constitutional: Negative for chills and fever.   Respiratory: Negative for shortness of breath.    Cardiovascular: Negative for chest pain and peripheral edema.   Gastrointestinal: Positive for abdominal pain, constipation and diarrhea. Negative for hematochezia.   Genitourinary: Negative for dysuria and frequency.   Musculoskeletal: Positive for arthralgias and back pain. Negative for myalgias.   Neurological: Negative for light-headedness and paresthesias.   Psychiatric/Behavioral: Negative for mood changes.       History  Past Medical History:   Diagnosis Date     Dysthymic disorder 08/04/2011     Mixed hyperlipidemia      Obstructive sleep apnea 09/18/2012     TIA (transient ischemic attack) 2022    left sided symtpoms for 4 hours     Unspecified essential hypertension 09/13/2011       Past Surgical History:   Procedure Laterality Date     COLONOSCOPY N/A 4/28/2016    Procedure: COLONOSCOPY;  Surgeon: Victoria Mathis MD;  Location:  GI     COLONOSCOPY N/A 10/29/2019    Procedure: COLONOSCOPY, WITH POLYPECTOMY;  Surgeon: Perla Hay MD;  Location: [facility]     LAMINECTOMY CERIVCAL POSTERIOR THREE+ LEVELS  2002    C3-7       Family History   Problem Relation Age of Onset     Diabetes Mother      Thyroid Disease Mother 60        treated with radioactive iodine     Diabetes Father      C.A.D. Father         silent heart attacks     Glaucoma Father      Colon Cancer Father 70        treated with surgery, 6\" " "colon removed     Thyroid Disease Sister 30     Diabetes Maternal Grandfather      Sleep Apnea Daughter        Social History     Tobacco Use     Smoking status: Never     Smokeless tobacco: Never   Substance Use Topics     Alcohol use: Never     Comment: rare        Objective  /57 (BP Location: Right arm, Patient Position: Sitting, Cuff Size: Adult Large)   Pulse 92   Ht 1.702 m (5' 7.01\")   Wt 97.9 kg (215 lb 12.8 oz)   SpO2 97%   BMI 33.79 kg/m    Vitals taken by Kimo Musa MD    Physical Exam  Constitutional:       General: She is not in acute distress.     Appearance: She is not ill-appearing or toxic-appearing.   HENT:      Head: Normocephalic.   Eyes:      Conjunctiva/sclera: Conjunctivae normal.   Cardiovascular:      Rate and Rhythm: Regular rhythm.      Heart sounds: Normal heart sounds. No murmur heard.    No friction rub. No gallop.   Pulmonary:      Effort: Pulmonary effort is normal. No respiratory distress.      Breath sounds: Normal breath sounds. No wheezing, rhonchi or rales.   Abdominal:      General: Bowel sounds are normal. There is no distension.      Palpations: Abdomen is soft.      Tenderness: There is no abdominal tenderness.   Musculoskeletal:      Right lower leg: No edema.      Left lower leg: No edema.      Comments: Mild laxity to left medial knee to lateral movement of left lower leg. Mild laxity with anterior draw to left knee.   Tenderness along both anterior knees below joint line.   No tenderness along both patellas, distal quadraceps.   Mild tenderness along medial and lateral distal hamstrings to palpation with both knees.   No joint effusion present.    Skin:     General: Skin is warm and dry.   Neurological:      Mental Status: She is alert.   Psychiatric:         Mood and Affect: Mood normal.         Thought Content: Thought content normal.         25 minutes spent on the date of the encounter doing chart review, history and exam, documentation and " further activities per the note.      Return in about 6 months (around 8/13/2023).      Kimo Musa MD  [facility] INTERNAL MEDICINE [place]

## 2023-02-13 NOTE — PATIENT INSTRUCTIONS
- Repeat Liver enzymes and check Thyroid lab (TSH) in early March.   - If Liver enzymes are better, may consider Selenium at 100 mcg daily.   - Consider Physical therapy for your knees. You have some looseness of her inner knee (likely medial collateral ligament) and likely straining your hamstrings. Message Dr. Musa if you would like physical therapy.   - Senna daily as needed. Continue on Prune juice. May also use Miralax as needed if feel you are getting implacted.     Follow up again in 6 months or as needed.

## 2023-02-13 NOTE — ASSESSMENT & PLAN NOTE
Has mild transaminitis likely from Atorvastatin.   - Continued on lower dose of Atorvastatin 20mg daily.   - Repeat Liver enzymes next month. If still abnormal, may consider changing to Rosuvastatin.

## 2023-02-13 NOTE — ASSESSMENT & PLAN NOTE
Appears to have medial collateral and ACL laxity on left knee exam. Also has tenderness along bilateral distant hamstrings of both knees, likely contributing to knee strain.   - Consider Physical therapy for your knees. Patient to message Dr. Musa if you would like physical therapy.   - If Liver enzymes are better, may consider Selenium at 100 mcg daily.

## 2023-02-13 NOTE — ASSESSMENT & PLAN NOTE
- Senna daily as needed OTC. Continue on Prune juice. May also use Miralax as needed if feel you are getting implacted.   - Check TSH lab next month.

## 2023-02-13 NOTE — NURSING NOTE
"Toshia Caruso is a 77 year old female patient that presents today in clinic for the following:    Chief Complaint   Patient presents with     Follow Up     4 month follow up     Constipation     Diarrhea     Derm Problem     Pt reports finger tips are getting slippery     Recheck Medication     Pt wanting to ask about \"Selinium\", an herb or vitamin. Pt updating Aspirin to 81 mg a day     The patient's allergies and medications were reviewed as noted. A set of vitals were recorded as noted without incident: /57 (BP Location: Right arm, Patient Position: Sitting, Cuff Size: Adult Large)   Pulse 92   Ht 1.702 m (5' 7.01\")   Wt 97.9 kg (215 lb 12.8 oz)   SpO2 97%   BMI 33.79 kg/m  . The patient does not have any other questions for the provider.    Rafita Colin, Visit Facilitator 8:35 AM on 2/13/2023   "

## 2023-03-07 ENCOUNTER — LAB (OUTPATIENT)
Dept: LAB | Facility: CLINIC | Age: 78
End: 2023-03-07
Payer: COMMERCIAL

## 2023-03-07 DIAGNOSIS — R74.01 TRANSAMINITIS: ICD-10-CM

## 2023-03-07 DIAGNOSIS — Z86.73 HISTORY OF TIA (TRANSIENT ISCHEMIC ATTACK) AND STROKE: ICD-10-CM

## 2023-03-07 DIAGNOSIS — K59.01 SLOW TRANSIT CONSTIPATION: ICD-10-CM

## 2023-03-07 DIAGNOSIS — E78.00 PURE HYPERCHOLESTEROLEMIA: Primary | ICD-10-CM

## 2023-03-07 LAB
ALBUMIN SERPL BCG-MCNC: 4.4 G/DL (ref 3.5–5.2)
ALP SERPL-CCNC: 241 U/L (ref 35–104)
ALT SERPL W P-5'-P-CCNC: 53 U/L (ref 10–35)
AST SERPL W P-5'-P-CCNC: 36 U/L (ref 10–35)
BILIRUB DIRECT SERPL-MCNC: <0.2 MG/DL (ref 0–0.3)
BILIRUB SERPL-MCNC: 0.4 MG/DL
PROT SERPL-MCNC: 7.3 G/DL (ref 6.4–8.3)
TSH SERPL DL<=0.005 MIU/L-ACNC: 2.68 UIU/ML (ref 0.3–4.2)

## 2023-03-07 PROCEDURE — 80076 HEPATIC FUNCTION PANEL: CPT | Performed by: PATHOLOGY

## 2023-03-07 PROCEDURE — 84443 ASSAY THYROID STIM HORMONE: CPT | Performed by: PATHOLOGY

## 2023-03-07 PROCEDURE — 36415 COLL VENOUS BLD VENIPUNCTURE: CPT | Performed by: PATHOLOGY

## 2023-03-07 RX ORDER — ROSUVASTATIN CALCIUM 10 MG/1
10 TABLET, COATED ORAL DAILY
Qty: 90 TABLET | Refills: 0 | Status: SHIPPED | OUTPATIENT
Start: 2023-03-07 | End: 2023-05-31

## 2023-03-26 ENCOUNTER — HEALTH MAINTENANCE LETTER (OUTPATIENT)
Age: 78
End: 2023-03-26

## 2023-05-10 ENCOUNTER — MYC MEDICAL ADVICE (OUTPATIENT)
Dept: INTERNAL MEDICINE | Facility: CLINIC | Age: 78
End: 2023-05-10
Payer: COMMERCIAL

## 2023-05-11 PROBLEM — U07.1 INFECTION DUE TO 2019 NOVEL CORONAVIRUS: Status: ACTIVE | Noted: 2023-05-11

## 2023-05-24 ENCOUNTER — TELEPHONE (OUTPATIENT)
Dept: CARE COORDINATION | Facility: CLINIC | Age: 78
End: 2023-05-24
Payer: COMMERCIAL

## 2023-05-24 DIAGNOSIS — R74.01 TRANSAMINITIS: Primary | ICD-10-CM

## 2023-05-24 NOTE — TELEPHONE ENCOUNTER
Pt is currently on Rosuvastatin 10 mg/day.  According to the lab results on 2/6/23, she needs to take decreased dosage due to the LFT elevation.   Could you clarify the dosage? Thanks.    Soon-Mi

## 2023-05-26 DIAGNOSIS — E78.00 PURE HYPERCHOLESTEROLEMIA: ICD-10-CM

## 2023-05-26 DIAGNOSIS — Z86.73 HISTORY OF TIA (TRANSIENT ISCHEMIC ATTACK) AND STROKE: ICD-10-CM

## 2023-05-31 RX ORDER — ROSUVASTATIN CALCIUM 10 MG/1
10 TABLET, COATED ORAL DAILY
Qty: 90 TABLET | Refills: 3 | Status: SHIPPED | OUTPATIENT
Start: 2023-05-31 | End: 2024-04-03

## 2023-05-31 NOTE — TELEPHONE ENCOUNTER
ROSUVASTATIN CALCIUM 10 MG TAB      Last Written Prescription Date:  3-7-23  Last Fill Quantity: 90,   # refills: 0  Last Office Visit : 2-13-23  Future Office visit:  8-15-23    See 5-24-23  Tele note, repeat labs    Routing refill request to provider for review/approval because:  Last Rx initial Rx/fill

## 2023-08-17 ENCOUNTER — OFFICE VISIT (OUTPATIENT)
Dept: INTERNAL MEDICINE | Facility: CLINIC | Age: 78
End: 2023-08-17
Payer: COMMERCIAL

## 2023-08-17 ENCOUNTER — LAB (OUTPATIENT)
Dept: LAB | Facility: CLINIC | Age: 78
End: 2023-08-17
Payer: COMMERCIAL

## 2023-08-17 VITALS
WEIGHT: 215.5 LBS | HEIGHT: 67 IN | SYSTOLIC BLOOD PRESSURE: 124 MMHG | OXYGEN SATURATION: 96 % | BODY MASS INDEX: 33.82 KG/M2 | DIASTOLIC BLOOD PRESSURE: 85 MMHG | HEART RATE: 100 BPM

## 2023-08-17 DIAGNOSIS — H04.203 WATERY EYES: ICD-10-CM

## 2023-08-17 DIAGNOSIS — Z12.31 ENCOUNTER FOR SCREENING MAMMOGRAM FOR BREAST CANCER: ICD-10-CM

## 2023-08-17 DIAGNOSIS — K59.00 CONSTIPATION, UNSPECIFIED CONSTIPATION TYPE: ICD-10-CM

## 2023-08-17 DIAGNOSIS — Z00.00 HEALTHCARE MAINTENANCE: ICD-10-CM

## 2023-08-17 DIAGNOSIS — R74.01 TRANSAMINITIS: Primary | ICD-10-CM

## 2023-08-17 DIAGNOSIS — R74.01 TRANSAMINITIS: ICD-10-CM

## 2023-08-17 LAB
ALBUMIN SERPL BCG-MCNC: 4.4 G/DL (ref 3.5–5.2)
ALP SERPL-CCNC: 80 U/L (ref 35–104)
ALT SERPL W P-5'-P-CCNC: 17 U/L (ref 0–50)
AST SERPL W P-5'-P-CCNC: 23 U/L (ref 0–45)
BILIRUB DIRECT SERPL-MCNC: <0.2 MG/DL (ref 0–0.3)
BILIRUB SERPL-MCNC: 0.3 MG/DL
PROT SERPL-MCNC: 6.7 G/DL (ref 6.4–8.3)

## 2023-08-17 PROCEDURE — 99214 OFFICE O/P EST MOD 30 MIN: CPT | Performed by: HOSPITALIST

## 2023-08-17 PROCEDURE — 36415 COLL VENOUS BLD VENIPUNCTURE: CPT | Performed by: PATHOLOGY

## 2023-08-17 PROCEDURE — 80076 HEPATIC FUNCTION PANEL: CPT | Performed by: PATHOLOGY

## 2023-08-17 NOTE — PROGRESS NOTES
Assessment/Plan  Problem List Items Addressed This Visit          Digestive    Constipation     - May continue senna and miralax as needed.             Other    Transaminitis - Primary     Suspect liver enzymes were mildly elevated from use of Atorvastatin. Patient has since continued on Rosuvastatin.   - Recheck Liver enzymes.   - If liver enzymes are abnormal still, will order for an ultrasound to check.   - Anticipate recheck of Lipids at follow up visit.          Relevant Orders    Hepatic panel (Albumin, ALT, AST, Bili, Alk Phos, TP) (Completed)    Encounter for screening mammogram for breast cancer     - Ordering Mammogram to obtain in about 2 months.          Relevant Orders    *MA Screening Digital Bilateral    Watery eyes     Patient mentions episodic lateral watery eyes and fatigue since having a COVID19 infection 4 months ago. No itchy or painful eyes.   - Trial of claritin daily for 7 days at night, then as needed.          Healthcare maintenance     - Ordering Mammogram to obtain in about 2 months.   - Consider COVID19 bivalent vaccine after 2 weeks at the pharmacy.   - Obtain Shingrix at the pharmacy (2 series vaccine).   - Anticipate recheck of cholesterol in the spring.   - In 2024, another colonoscopy. Patient prefers a recheck of colonoscopy after polypectomy on last colonoscopy.             No results found for any visits on 08/17/23.    Health Maintenance Due   Topic Date Due    ADVANCE CARE PLANNING  Never done    DEPRESSION ACTION PLAN  Never done    ZOSTER IMMUNIZATION (2 of 3) 11/14/2012    MEDICARE ANNUAL WELLNESS VISIT  06/02/2019    FALL RISK ASSESSMENT  09/19/2020    COVID-19 Vaccine (2 - Booster for Mehreen series) 11/09/2021    PHQ-9  08/13/2023         Subjective  Mentions she had COVID19 mild infection about 4 months ago. Has watery eyes at 2-3AM that wakes her up and watery eyes continues for 1-2 hours. No dry eyes, no swelling of her eye lids. No itchy eyes. Not every night, may  happen every 4th night. Usually goes to the recliner until it improves before going to bed. Typically goes to be at 10-11pm. Tries to get 7-8 hours at night though. Fatigue during the day since infection. Has left shoulder pain that is chronic, does not have full range of motion. Has been taking tylenol PM extra strength 1 tablet at night. No bodyaches. Uses a vibrator on her left shoulder that seems to help.     Only uses claritin as needed, when itchy eyes or runny nose.     Does deal with constipation at time. Uses senna and miralax at times. Has been eating lots of fruits lately.     Lost a cousin to liver cancer last year.     Has different sensation along along ankle than at knee.         Review of Systems   Constitutional:  Positive for fatigue. Negative for chills and fever.   Eyes:  Positive for discharge. Negative for pain and itching.   Respiratory:  Negative for shortness of breath.    Cardiovascular:  Negative for chest pain and peripheral edema.   Gastrointestinal:  Positive for constipation. Negative for abdominal pain.   Genitourinary:  Negative for dysuria.   Skin:  Negative for rash.   Neurological:  Positive for paresthesias.   Psychiatric/Behavioral:  Negative for mood changes.        History  Past Medical History:   Diagnosis Date    Dysthymic disorder 08/04/2011    Mixed hyperlipidemia     Obstructive sleep apnea 09/18/2012    TIA (transient ischemic attack) 2022    left sided symtpoms for 4 hours    Unspecified essential hypertension 09/13/2011       Past Surgical History:   Procedure Laterality Date    COLONOSCOPY N/A 4/28/2016    Procedure: COLONOSCOPY;  Surgeon: Victoria Mathis MD;  Location:  GI    COLONOSCOPY N/A 10/29/2019    Procedure: COLONOSCOPY, WITH POLYPECTOMY;  Surgeon: Perla Hay MD;  Location: [facility] OR    LAMINECTOMY CERIVCAL POSTERIOR THREE+ LEVELS  2002    C3-7       Family History   Problem Relation Age of Onset    Diabetes Mother     Thyroid  "Disease Mother 60        treated with radioactive iodine    Diabetes Father     C.A.D. Father         silent heart attacks    Glaucoma Father     Colon Cancer Father 70        treated with surgery, 6\" colon removed    Thyroid Disease Sister 30    Diabetes Maternal Grandfather     Sleep Apnea Daughter        Social History     Tobacco Use    Smoking status: Never    Smokeless tobacco: Never   Substance Use Topics    Alcohol use: Never     Comment: rare        Objective  /85 (BP Location: Right arm, Patient Position: Sitting, Cuff Size: Adult Large)   Pulse 100   Ht 1.702 m (5' 7.01\")   Wt 97.8 kg (215 lb 8 oz)   SpO2 96%   BMI 33.74 kg/m    Vitals taken by Kimo Musa MD    Physical Exam  Constitutional:       General: She is not in acute distress.     Appearance: She is not ill-appearing or toxic-appearing.   HENT:      Head: Normocephalic.   Eyes:      Conjunctiva/sclera: Conjunctivae normal.   Cardiovascular:      Rate and Rhythm: Regular rhythm.      Heart sounds: Normal heart sounds. No murmur heard.     No friction rub. No gallop.   Pulmonary:      Effort: Pulmonary effort is normal. No respiratory distress.      Breath sounds: No wheezing, rhonchi or rales.   Abdominal:      General: Bowel sounds are normal. There is no distension.      Palpations: Abdomen is soft.      Tenderness: There is no abdominal tenderness.   Musculoskeletal:      Right lower leg: Edema present.      Left lower leg: Edema present.      Comments: Non pitting edema of both lower legs, no edema at ankles. Surface of skin has veins present.    Neurological:      Mental Status: She is alert.   Psychiatric:         Mood and Affect: Mood normal.         Thought Content: Thought content normal.         25 minutes spent on the date of the encounter doing chart review, history and exam, documentation and further activities per the note.      Return in about 6 months (around 2/17/2024).      Kimo Musa MD  [facility] INTERNAL MEDICINE [place]

## 2023-08-17 NOTE — NURSING NOTE
Toshia Caruso is a 77 year old female patient that presents today in clinic for the following:    Chief Complaint   Patient presents with    RECHECK     Continuing fatigue post COVID, intermittent constipation, difficulty with bowel movements, increased eye fluid secretion     The patient's allergies and medications were reviewed as noted. A set of vitals were recorded as noted without incident. The patient does not have any other questions for the provider.    Addy Zamudio, EMT at 2:12 PM on 8/17/2023

## 2023-08-17 NOTE — PATIENT INSTRUCTIONS
- Trial of claritin daily for 7 days at night.   - Recheck Liver enzymes.   - Ordering Mammogram to obtain in about 2 months.   - Consider COVID19 bivalent vaccine after 2 weeks at the pharmacy.   - Obtain Shingrix at the pharmacy (2 series vaccine).     - If liver enzymes are abnormal still, will order for an ultrasound to check.     - May continue senna and miralax as needed.     Follow up again in 6 months. Anticipate recheck of cholesterol in the spring.     In 2024, another colonoscopy.

## 2023-08-18 PROBLEM — H04.203 WATERY EYES: Status: ACTIVE | Noted: 2023-08-18

## 2023-08-18 PROBLEM — Z00.00 HEALTHCARE MAINTENANCE: Status: ACTIVE | Noted: 2023-08-18

## 2023-08-18 ASSESSMENT — ENCOUNTER SYMPTOMS
SHORTNESS OF BREATH: 0
FEVER: 0
CONSTIPATION: 1
FATIGUE: 1
EYE PAIN: 0
EYE DISCHARGE: 1
DYSURIA: 0
CHILLS: 0
PARESTHESIAS: 1
EYE ITCHING: 0
ABDOMINAL PAIN: 0

## 2023-08-18 NOTE — ASSESSMENT & PLAN NOTE
Patient mentions episodic lateral watery eyes and fatigue since having a COVID19 infection 4 months ago. No itchy or painful eyes.   - Trial of claritin daily for 7 days at night, then as needed.

## 2023-08-18 NOTE — ASSESSMENT & PLAN NOTE
- Ordering Mammogram to obtain in about 2 months.   - Consider COVID19 bivalent vaccine after 2 weeks at the pharmacy.   - Obtain Shingrix at the pharmacy (2 series vaccine).   - Anticipate recheck of cholesterol in the spring.   - In 2024, another colonoscopy. Patient prefers a recheck of colonoscopy after polypectomy on last colonoscopy.

## 2023-08-18 NOTE — ASSESSMENT & PLAN NOTE
Suspect liver enzymes were mildly elevated from use of Atorvastatin. Patient has since continued on Rosuvastatin.   - Recheck Liver enzymes.   - If liver enzymes are abnormal still, will order for an ultrasound to check.   - Anticipate recheck of Lipids at follow up visit.

## 2023-09-26 ENCOUNTER — MYC MEDICAL ADVICE (OUTPATIENT)
Dept: INTERNAL MEDICINE | Facility: CLINIC | Age: 78
End: 2023-09-26
Payer: COMMERCIAL

## 2023-09-26 DIAGNOSIS — G47.33 OBSTRUCTIVE SLEEP APNEA: Primary | ICD-10-CM

## 2023-10-19 ENCOUNTER — ANCILLARY PROCEDURE (OUTPATIENT)
Dept: MAMMOGRAPHY | Facility: CLINIC | Age: 78
End: 2023-10-19
Attending: HOSPITALIST
Payer: COMMERCIAL

## 2023-10-19 DIAGNOSIS — Z12.31 ENCOUNTER FOR SCREENING MAMMOGRAM FOR BREAST CANCER: ICD-10-CM

## 2023-10-19 PROCEDURE — 77063 BREAST TOMOSYNTHESIS BI: CPT | Mod: GC

## 2023-10-19 PROCEDURE — 77067 SCR MAMMO BI INCL CAD: CPT | Mod: GC

## 2023-11-05 ENCOUNTER — APPOINTMENT (OUTPATIENT)
Dept: GENERAL RADIOLOGY | Facility: CLINIC | Age: 78
End: 2023-11-05
Attending: EMERGENCY MEDICINE
Payer: COMMERCIAL

## 2023-11-05 ENCOUNTER — HOSPITAL ENCOUNTER (EMERGENCY)
Facility: CLINIC | Age: 78
Discharge: HOME | End: 2023-11-06
Attending: EMERGENCY MEDICINE | Admitting: EMERGENCY MEDICINE
Payer: COMMERCIAL

## 2023-11-05 DIAGNOSIS — R07.89 OTHER CHEST PAIN: Primary | ICD-10-CM

## 2023-11-05 DIAGNOSIS — R07.9 CHEST PAIN, UNSPECIFIED TYPE: ICD-10-CM

## 2023-11-05 LAB
ALBUMIN SERPL BCG-MCNC: 3.9 G/DL (ref 3.5–5.2)
ALP SERPL-CCNC: 81 U/L (ref 35–104)
ALT SERPL W P-5'-P-CCNC: 14 U/L (ref 0–50)
ANION GAP SERPL CALCULATED.3IONS-SCNC: 11 MMOL/L (ref 7–15)
AST SERPL W P-5'-P-CCNC: 19 U/L (ref 0–45)
BASOPHILS # BLD AUTO: 0 10E3/UL (ref 0–0.2)
BASOPHILS NFR BLD AUTO: 0 %
BILIRUB SERPL-MCNC: 0.2 MG/DL
BUN SERPL-MCNC: 16.9 MG/DL (ref 8–23)
CALCIUM SERPL-MCNC: 8.8 MG/DL (ref 8.8–10.2)
CHLORIDE SERPL-SCNC: 106 MMOL/L (ref 98–107)
CREAT SERPL-MCNC: 0.76 MG/DL (ref 0.51–0.95)
D DIMER PPP FEU-MCNC: 0.62 UG/ML FEU (ref 0–0.5)
DEPRECATED HCO3 PLAS-SCNC: 22 MMOL/L (ref 22–29)
EGFRCR SERPLBLD CKD-EPI 2021: 80 ML/MIN/1.73M2
EOSINOPHIL # BLD AUTO: 0.2 10E3/UL (ref 0–0.7)
EOSINOPHIL NFR BLD AUTO: 3 %
ERYTHROCYTE [DISTWIDTH] IN BLOOD BY AUTOMATED COUNT: 12.8 % (ref 10–15)
GLUCOSE SERPL-MCNC: 147 MG/DL (ref 70–99)
HCT VFR BLD AUTO: 42.5 % (ref 35–47)
HGB BLD-MCNC: 14.2 G/DL (ref 11.7–15.7)
IMM GRANULOCYTES # BLD: 0 10E3/UL
IMM GRANULOCYTES NFR BLD: 0 %
LYMPHOCYTES # BLD AUTO: 1.7 10E3/UL (ref 0.8–5.3)
LYMPHOCYTES NFR BLD AUTO: 23 %
MCH RBC QN AUTO: 32.1 PG (ref 26.5–33)
MCHC RBC AUTO-ENTMCNC: 33.4 G/DL (ref 31.5–36.5)
MCV RBC AUTO: 96 FL (ref 78–100)
MONOCYTES # BLD AUTO: 0.7 10E3/UL (ref 0–1.3)
MONOCYTES NFR BLD AUTO: 9 %
NEUTROPHILS # BLD AUTO: 4.6 10E3/UL (ref 1.6–8.3)
NEUTROPHILS NFR BLD AUTO: 65 %
NRBC # BLD AUTO: 0 10E3/UL
NRBC BLD AUTO-RTO: 0 /100
PLATELET # BLD AUTO: 295 10E3/UL (ref 150–450)
POTASSIUM SERPL-SCNC: 3.7 MMOL/L (ref 3.4–5.3)
PROT SERPL-MCNC: 6.3 G/DL (ref 6.4–8.3)
RBC # BLD AUTO: 4.42 10E6/UL (ref 3.8–5.2)
SODIUM SERPL-SCNC: 139 MMOL/L (ref 135–145)
TROPONIN T SERPL HS-MCNC: 9 NG/L
WBC # BLD AUTO: 7.3 10E3/UL (ref 4–11)

## 2023-11-05 PROCEDURE — 93010 ELECTROCARDIOGRAM REPORT: CPT | Mod: 59 | Performed by: EMERGENCY MEDICINE

## 2023-11-05 PROCEDURE — 99285 EMERGENCY DEPT VISIT HI MDM: CPT | Mod: 25

## 2023-11-05 PROCEDURE — 36415 COLL VENOUS BLD VENIPUNCTURE: CPT | Performed by: EMERGENCY MEDICINE

## 2023-11-05 PROCEDURE — 71046 X-RAY EXAM CHEST 2 VIEWS: CPT

## 2023-11-05 PROCEDURE — 93308 TTE F-UP OR LMTD: CPT | Mod: 26 | Performed by: EMERGENCY MEDICINE

## 2023-11-05 PROCEDURE — 84484 ASSAY OF TROPONIN QUANT: CPT | Mod: 91 | Performed by: EMERGENCY MEDICINE

## 2023-11-05 PROCEDURE — 85379 FIBRIN DEGRADATION QUANT: CPT | Performed by: EMERGENCY MEDICINE

## 2023-11-05 PROCEDURE — 93005 ELECTROCARDIOGRAM TRACING: CPT | Mod: 59

## 2023-11-05 PROCEDURE — 80053 COMPREHEN METABOLIC PANEL: CPT | Performed by: EMERGENCY MEDICINE

## 2023-11-05 PROCEDURE — 85025 COMPLETE CBC W/AUTO DIFF WBC: CPT | Performed by: EMERGENCY MEDICINE

## 2023-11-05 PROCEDURE — 71046 X-RAY EXAM CHEST 2 VIEWS: CPT | Mod: 26 | Performed by: RADIOLOGY

## 2023-11-05 PROCEDURE — 93308 TTE F-UP OR LMTD: CPT

## 2023-11-05 PROCEDURE — 99285 EMERGENCY DEPT VISIT HI MDM: CPT | Mod: 25 | Performed by: EMERGENCY MEDICINE

## 2023-11-05 ASSESSMENT — ACTIVITIES OF DAILY LIVING (ADL): ADLS_ACUITY_SCORE: 35

## 2023-11-06 VITALS
BODY MASS INDEX: 33.43 KG/M2 | DIASTOLIC BLOOD PRESSURE: 67 MMHG | HEART RATE: 84 BPM | SYSTOLIC BLOOD PRESSURE: 157 MMHG | WEIGHT: 213 LBS | TEMPERATURE: 97.7 F | RESPIRATION RATE: 18 BRPM | OXYGEN SATURATION: 96 % | HEIGHT: 67 IN

## 2023-11-06 LAB
ATRIAL RATE - MUSE: 79 BPM
DIASTOLIC BLOOD PRESSURE - MUSE: NORMAL MMHG
HOLD SPECIMEN: NORMAL
INTERPRETATION ECG - MUSE: NORMAL
P AXIS - MUSE: 9 DEGREES
PR INTERVAL - MUSE: 152 MS
QRS DURATION - MUSE: 86 MS
QT - MUSE: 398 MS
QTC - MUSE: 456 MS
R AXIS - MUSE: -1 DEGREES
SYSTOLIC BLOOD PRESSURE - MUSE: NORMAL MMHG
T AXIS - MUSE: 32 DEGREES
TROPONIN T SERPL HS-MCNC: 9 NG/L
VENTRICULAR RATE- MUSE: 79 BPM

## 2023-11-06 PROCEDURE — 36415 COLL VENOUS BLD VENIPUNCTURE: CPT | Performed by: EMERGENCY MEDICINE

## 2023-11-06 PROCEDURE — 84484 ASSAY OF TROPONIN QUANT: CPT | Performed by: EMERGENCY MEDICINE

## 2023-11-06 ASSESSMENT — ACTIVITIES OF DAILY LIVING (ADL): ADLS_ACUITY_SCORE: 35

## 2023-11-06 NOTE — ED PROVIDER NOTES
ED Provider Note  [facility]      History     Chief Complaint   Patient presents with    Chest Pain    Back Pain     The history is provided by the patient, the spouse and medical records.     Toshia Caruso is a 78 year old female with history of HTN, transaminitis, and TIA (9/2022) who presents to the ED BIBA for back pain and L sided chest pain. Pt reports the pain started out under L shoulder blade then shortly after radiated to under her L breast. Onset of symptoms was around 2030 tonight. Pt reports starting to feel better after being given 324 mg aspirin in ambulance, since then pain has been decreased in intensity and less frequent. Pt reports pain was initially more intense and she was having difficulty breathing when she lied down. No hx of similar symptoms. No hx of heart or lung issues. No GI issues besides constipation. No recent infectious symptoms. Pt does report her dad had a hx of MI x3.    Past Medical History  Past Medical History:   Diagnosis Date    Dysthymic disorder 08/04/2011    Mixed hyperlipidemia     Obstructive sleep apnea 09/18/2012    TIA (transient ischemic attack) 2022    left sided symtpoms for 4 hours    Unspecified essential hypertension 09/13/2011     Past Surgical History:   Procedure Laterality Date    COLONOSCOPY N/A 4/28/2016    Procedure: COLONOSCOPY;  Surgeon: Victoria Mathis MD;  Location:  GI    COLONOSCOPY N/A 10/29/2019    Procedure: COLONOSCOPY, WITH POLYPECTOMY;  Surgeon: Perla Hay MD;  Location: [facility]    LAMINECTOMY CERIVCAL POSTERIOR THREE+ LEVELS  2002    C3-7     aspirin (ASA) 81 MG EC tablet  B Complex-C (SUPER B COMPLEX/VITAMIN C PO)  diphenhydrAMINE-APAP, sleep, (TYLENOL PM EXTRA STRENGTH PO)  fluticasone (FLONASE) 50 MCG/ACT nasal spray  folic acid (FOLVITE) 400 MCG tablet  Glucosamine-Chondroit-Vit C-Mn (GLUCOSAMINE CHONDROITIN COMPLX) TABS  loratadine (CLARITIN) 10 MG tablet  Multiple Vitamin  "(MULTIVITAMINS) CAPS  Nutritional Supplements (ESTROVEN) TABS  omega 3 1000 MG CAPS  Probiotic Product (SOLUBLE FIBER/PROBIOTICS PO)  rosuvastatin (CRESTOR) 10 MG tablet  Vitamin D (Cholecalciferol) 25 MCG (1000 UT) TABS      Allergies   Allergen Reactions    Smoke. Itching and Anaphylaxis     Watery eyes    Cat Hair Extract Hives    Cats Itching    Seasonal Allergies      Eyes, throat sinus     Family History  Family History   Problem Relation Age of Onset    Diabetes Mother     Thyroid Disease Mother 60        treated with radioactive iodine    Diabetes Father     C.A.D. Father         silent heart attacks    Glaucoma Father     Colon Cancer Father 70        treated with surgery, 6\" colon removed    Thyroid Disease Sister 30    Diabetes Maternal Grandfather     Sleep Apnea Daughter      Social History   Social History     Tobacco Use    Smoking status: Never    Smokeless tobacco: Never   Substance Use Topics    Alcohol use: Never     Comment: rare    Drug use: No      Past medical history, past surgical history, medications, allergies, family history, and social history were reviewed with the patient. No additional pertinent items.      A complete review of systems was performed with pertinent positives and negatives noted in the HPI, and all other systems negative.    Physical Exam   BP: (!) 180/86  Pulse: 84  Temp: 97.7  F (36.5  C)  Resp: 18  Height: 170.2 cm (5' 7\")  Weight: 96.6 kg (213 lb)  SpO2: 97 %  Physical Exam  Vitals and nursing note reviewed.   Constitutional:       General: She is not in acute distress.     Appearance: Normal appearance.   HENT:      Head: Normocephalic.      Nose: Nose normal.   Eyes:      Pupils: Pupils are equal, round, and reactive to light.   Cardiovascular:      Rate and Rhythm: Normal rate and regular rhythm.   Pulmonary:      Effort: Pulmonary effort is normal.   Abdominal:      General: There is no distension.   Musculoskeletal:         General: No deformity. Normal range " of motion.      Cervical back: Normal range of motion.   Skin:     General: Skin is warm.   Neurological:      Mental Status: She is alert and oriented to person, place, and time.   Psychiatric:         Mood and Affect: Mood normal.         ED Course, Procedures, & Data      Procedures  Results for orders placed during the hospital encounter of 11/05/23    POC US ECHO LIMITED    Impression  Limited Bedside Cardiac Ultrasound, performed and interpreted by me.  Indication: Chest Pain.  Parasternal long axis, parasternal short axis, apical 4 chamber, and subcostal views were acquired.  Image quality was satisfactory.    Findings:  Global left ventricular function appears intact.  Chambers do not appear dilated.  There is no evidence of free fluid within the pericardium.    IMPRESSION: Grossly normal left ventricular function and chamber size.  No pericardial effusion..            EKG Interpretation:      Interpreted by Zechariah García DO  Time reviewed: 0108  Symptoms at time of EKG: chest pain   Rhythm: normal sinus   Rate: normal  Axis: normal  Ectopy: none  Conduction: normal  ST Segments/ T Waves: No ST changes.  TWI lead III  Q Waves: none  Comparison to prior: Unchanged from 6/13/22    Clinical Impression: normal EKG                 Results for orders placed or performed during the hospital encounter of 11/05/23   XR Chest 2 Views     Status: None    Narrative    EXAM: XR CHEST 2 VIEWS  LOCATION: [facility]  DATE: 11/5/2023    INDICATION: chest pain  COMPARISON: None.      Impression    IMPRESSION: Normal heart size without pulmonary vascular congestion. Mild bibasilar atelectasis. No other focal pulmonary consolidation, or significant pleural effusion. No pneumothorax. No acute osseous abnormality.   POC US ECHO LIMITED     Status: None    Impression    Limited Bedside Cardiac Ultrasound, performed and interpreted by me.   Indication: Chest Pain.  Parasternal long  axis, parasternal short axis, apical 4 chamber, and subcostal views were acquired.   Image quality was satisfactory.    Findings:    Global left ventricular function appears intact.  Chambers do not appear dilated.  There is no evidence of free fluid within the pericardium.    IMPRESSION: Grossly normal left ventricular function and chamber size.  No pericardial effusion..      Extra Tube ([place] Draw)     Status: None    Narrative    The following orders were created for panel order Extra Tube ([place] Draw).  Procedure                               Abnormality         Status                     ---------                               -----------         ------                     Extra Blue Top Tube[[id number]]                              Final result               Extra Red Top Tube[[id number]]                               Final result               Extra Green Top (Lithium...[[id number]]                      Final result               Extra Purple Top Tube[[id number]]                            Final result                 Please view results for these tests on the individual orders.   [place] Draw     Status: None ()    Narrative    The following orders were created for panel order [place] Draw.  Procedure                               Abnormality         Status                     ---------                               -----------         ------                     Extra Blue Top Tube[[id number]]                                                         Extra Red Top Tube[[id number]]                                                          Extra Green Top (Lithium...[[id number]]                                                 Extra Purple Top Tube[[id number]]                                                         Please view results for these tests on the individual orders.   Extra Blue Top Tube     Status: None   Result Value Ref Range    Hold Specimen [facility]    Extra Red Top Tube     Status: None   Result Value Ref  Range    Hold Specimen [facility]    Extra Green Top (Lithium Heparin) Tube     Status: None   Result Value Ref Range    Hold Specimen [facility]    Extra Purple Top Tube     Status: None   Result Value Ref Range    Hold Specimen [facility]    Comprehensive metabolic panel     Status: Abnormal   Result Value Ref Range    Sodium 139 135 - 145 mmol/L    Potassium 3.7 3.4 - 5.3 mmol/L    Carbon Dioxide (CO2) 22 22 - 29 mmol/L    Anion Gap 11 7 - 15 mmol/L    Urea Nitrogen 16.9 8.0 - 23.0 mg/dL    Creatinine 0.76 0.51 - 0.95 mg/dL    GFR Estimate 80 >60 mL/min/1.73m2    Calcium 8.8 8.8 - 10.2 mg/dL    Chloride 106 98 - 107 mmol/L    Glucose 147 (H) 70 - 99 mg/dL    Alkaline Phosphatase 81 35 - 104 U/L    AST 19 0 - 45 U/L    ALT 14 0 - 50 U/L    Protein Total 6.3 (L) 6.4 - 8.3 g/dL    Albumin 3.9 3.5 - 5.2 g/dL    Bilirubin Total 0.2 <=1.2 mg/dL   Troponin T, High Sensitivity     Status: Normal   Result Value Ref Range    Troponin T, High Sensitivity 9 <=14 ng/L   D dimer quantitative     Status: Abnormal   Result Value Ref Range    D-Dimer Quantitative 0.62 (H) 0.00 - 0.50 ug/mL FEU    Narrative    This D-dimer assay is intended for use in conjunction with a clinical pretest probability assessment model to exclude pulmonary embolism (PE) and deep venous thrombosis (DVT) in outpatients suspected of PE or DVT. The cut-off value is 0.50 ug/mL FEU.    For patients 50 years of age or older, the application of age-adjusted cut-off values for D-Dimer may increase the specificity without significant effect on sensitivity. The literature suggested calculation age adjusted cut-off in ug/L = age in years x 10 ug/L. The results in this laboratory are reported as ug/mL rather than ug/L. The calculation for age adjusted cut off in ug/mL= age in years x 0.01 ug/mL. For example, the cut off for a 76 year old male is 76 x 0.01 ug/mL = 0.76 ug/mL (760 ug/L).    CAMRON Mcdonough et al. Age adjusted D-dimer cut-off levels to rule out pulmonary embolism: The  ADJUST-PE Study. LESVIA 2014;311:0689-9646.; HJ Balbina et al. Diagnostic accuracy of conventional or age adjusted D-dimer cutoff values in older patients with suspected venous thromboembolism. Systemic review and meta-analysis. BMJ 2013:346:f2492.   CBC with platelets and differential     Status: None   Result Value Ref Range    WBC Count 7.3 4.0 - 11.0 10e3/uL    RBC Count 4.42 3.80 - 5.20 10e6/uL    Hemoglobin 14.2 11.7 - 15.7 g/dL    Hematocrit 42.5 35.0 - 47.0 %    MCV 96 78 - 100 fL    MCH 32.1 26.5 - 33.0 pg    MCHC 33.4 31.5 - 36.5 g/dL    RDW 12.8 10.0 - 15.0 %    Platelet Count 295 150 - 450 10e3/uL    % Neutrophils 65 %    % Lymphocytes 23 %    % Monocytes 9 %    % Eosinophils 3 %    % Basophils 0 %    % Immature Granulocytes 0 %    NRBCs per 100 WBC 0 <1 /100    Absolute Neutrophils 4.6 1.6 - 8.3 10e3/uL    Absolute Lymphocytes 1.7 0.8 - 5.3 10e3/uL    Absolute Monocytes 0.7 0.0 - 1.3 10e3/uL    Absolute Eosinophils 0.2 0.0 - 0.7 10e3/uL    Absolute Basophils 0.0 0.0 - 0.2 10e3/uL    Absolute Immature Granulocytes 0.0 <=0.4 10e3/uL    Absolute NRBCs 0.0 10e3/uL   Troponin T, High Sensitivity     Status: Normal   Result Value Ref Range    Troponin T, High Sensitivity 9 <=14 ng/L   EKG 12-lead, complete     Status: None (Preliminary result)   Result Value Ref Range    Systolic Blood Pressure  mmHg    Diastolic Blood Pressure  mmHg    Ventricular Rate 79 BPM    Atrial Rate 79 BPM    GA Interval 152 ms    QRS Duration 86 ms     ms    QTc 456 ms    P Axis 9 degrees    R AXIS -1 degrees    T Axis 32 degrees    Interpretation ECG       Sinus rhythm  Cannot rule out Inferior infarct , age undetermined  Cannot rule out Anterior infarct , age undetermined  Abnormal ECG     CBC with platelets differential     Status: None    Narrative    The following orders were created for panel order CBC with platelets differential.  Procedure                               Abnormality         Status                      ---------                               -----------         ------                     CBC with platelets and d...[[id number]]                      Final result                 Please view results for these tests on the individual orders.     Medications - No data to display  Labs Ordered and Resulted from Time of ED Arrival to Time of ED Departure   COMPREHENSIVE METABOLIC PANEL - Abnormal       Result Value    Sodium 139      Potassium 3.7      Carbon Dioxide (CO2) 22      Anion Gap 11      Urea Nitrogen 16.9      Creatinine 0.76      GFR Estimate 80      Calcium 8.8      Chloride 106      Glucose 147 (*)     Alkaline Phosphatase 81      AST 19      ALT 14      Protein Total 6.3 (*)     Albumin 3.9      Bilirubin Total 0.2     D DIMER QUANTITATIVE - Abnormal    D-Dimer Quantitative 0.62 (*)    TROPONIN T, HIGH SENSITIVITY - Normal    Troponin T, High Sensitivity 9     TROPONIN T, HIGH SENSITIVITY - Normal    Troponin T, High Sensitivity 9     CBC WITH PLATELETS AND DIFFERENTIAL    WBC Count 7.3      RBC Count 4.42      Hemoglobin 14.2      Hematocrit 42.5      MCV 96      MCH 32.1      MCHC 33.4      RDW 12.8      Platelet Count 295      % Neutrophils 65      % Lymphocytes 23      % Monocytes 9      % Eosinophils 3      % Basophils 0      % Immature Granulocytes 0      NRBCs per 100 WBC 0      Absolute Neutrophils 4.6      Absolute Lymphocytes 1.7      Absolute Monocytes 0.7      Absolute Eosinophils 0.2      Absolute Basophils 0.0      Absolute Immature Granulocytes 0.0      Absolute NRBCs 0.0       POC US ECHO LIMITED   Final Result   Limited Bedside Cardiac Ultrasound, performed and interpreted by me.    Indication: Chest Pain.   Parasternal long axis, parasternal short axis, apical 4 chamber, and subcostal views were acquired.    Image quality was satisfactory.      Findings:     Global left ventricular function appears intact.   Chambers do not appear dilated.   There is no evidence of free fluid within the  pericardium.      IMPRESSION: Grossly normal left ventricular function and chamber size.  No pericardial effusion..          XR Chest 2 Views   Final Result   IMPRESSION: Normal heart size without pulmonary vascular congestion. Mild bibasilar atelectasis. No other focal pulmonary consolidation, or significant pleural effusion. No pneumothorax. No acute osseous abnormality.             Critical care was not performed.     Medical Decision Making  The patient's presentation was of high complexity (an acute health issue posing potential threat to life or bodily function).    The patient's evaluation involved:  review of 1 test result(s) ordered prior to this encounter (EKG review from 6/22)  ordering and/or review of 3+ test(s) in this encounter (see separate area of note for details) independent review of chest x-ray    The patient's management necessitated only low risk treatment.    Assessment & Plan    Patient presents the ED for evaluation of chest pain.  On arrival, she has normal vital signs and overall appears nondistressed.  She had an aspirin before EMS was arrival but did not receive external medications in route.  She states that her pain is completely resolved.    EKG shows normal sinus rhythm without signs of acute ischemia.  There is a T wave inversion in lead III which appears similar to previous.  High sensitive troponin is negative x2.  Low suspicion for acute coronary syndrome.    D-dimer is slightly elevated at 0.62 but is well within the age-adjusted range.  Low suspicion for pulmonary embolism.  No characteristic ripping/tearing pain rating to the back, widened mediastinum on x-ray, pulse deficit on exam, unlikely aortic dissection.  Point-of-care ultrasound negative for pericardial effusion/tamponade.  No RV dilatation was also decreased risk of pulmonary embolism.    Labs ordered/reviewed.  CBC without leukocytosis or anemia.  Metabolic panel shows normal electrolytes and normal renal function.   No transaminitis or bilirubin elevation to suggest hepatobiliary pathology.    X-ray reviewed by me.  Negative for pneumothorax, pneumonia, or other acute pathology to explain patient's symptoms.    Reassessment, patient continues to be pain-free.  She has been stable while in the ED and was observed for approximately 4 hours.  The exact etiology of her symptoms is unclear but we are unable to identify any emergent pathology in the ED today.  Recommend PCP follow-up in 5 to 7 days.  Educated reasons to return to the ED.      I have reviewed the nursing notes. I have reviewed the findings, diagnosis, plan and need for follow up with the patient.    New Prescriptions    No medications on file       Final diagnoses:   Chest pain, unspecified type       Zechariah García DO  [facility] EMERGENCY DEPARTMENT  11/5/2023     Zechariah García DO  11/06/23 0213

## 2023-11-06 NOTE — DISCHARGE INSTRUCTIONS
Your work-up in the emergency department clearing labs, chest x-ray, and ultrasound, is overall reassuring.  The exact cause of your symptoms is unclear.  I recommend you follow-up with your primary care physician within the next week.  Return to the ED immediately with any new or worsening symptoms.

## 2023-11-06 NOTE — ED TRIAGE NOTES
Arrives from home via EMS, c/o back pain and l sided chest pain. Described as tightness, hurts to take a deep breath. 324 asa given by medics. Pain has subsided somewhat but still present on arrival. VSS enroute.

## 2023-11-14 ENCOUNTER — TELEPHONE (OUTPATIENT)
Dept: SLEEP MEDICINE | Facility: CLINIC | Age: 78
End: 2023-11-14
Payer: COMMERCIAL

## 2023-11-14 NOTE — TELEPHONE ENCOUNTER
Pt called to try and get on the schedule with someone if she needs to.   Pt has a warning that says that motor is going to die. Medical supply would need a new RX for a new machine. Does pt need an appt and if some can it be a telephone call?  Pt wants to set up her annual follow up but seeing as [place] she is afraid the machine will die before the appt.

## 2023-11-15 NOTE — TELEPHONE ENCOUNTER
Detailed message left for patient. Advised appointment is needed by insurance to cover replacement device. Reassured her machines can last up to a year with shown message. Advised if machine does malfunction she should reach out to DME.     Patient is on wait list.     Jenna LOYA RN  [facility]

## 2023-11-27 ENCOUNTER — LAB (OUTPATIENT)
Dept: LAB | Facility: CLINIC | Age: 78
End: 2023-11-27
Payer: COMMERCIAL

## 2023-11-27 ENCOUNTER — OFFICE VISIT (OUTPATIENT)
Dept: INTERNAL MEDICINE | Facility: CLINIC | Age: 78
End: 2023-11-27
Payer: COMMERCIAL

## 2023-11-27 VITALS
WEIGHT: 217.1 LBS | OXYGEN SATURATION: 98 % | HEART RATE: 78 BPM | SYSTOLIC BLOOD PRESSURE: 172 MMHG | HEIGHT: 67 IN | DIASTOLIC BLOOD PRESSURE: 76 MMHG | BODY MASS INDEX: 34.07 KG/M2

## 2023-11-27 DIAGNOSIS — R03.0 ELEVATED BP WITHOUT DIAGNOSIS OF HYPERTENSION: ICD-10-CM

## 2023-11-27 DIAGNOSIS — E55.9 VITAMIN D DEFICIENCY: ICD-10-CM

## 2023-11-27 DIAGNOSIS — F33.0 MILD EPISODE OF RECURRENT MAJOR DEPRESSIVE DISORDER (H): ICD-10-CM

## 2023-11-27 DIAGNOSIS — F33.0 MILD EPISODE OF RECURRENT MAJOR DEPRESSIVE DISORDER (H): Primary | ICD-10-CM

## 2023-11-27 DIAGNOSIS — Z23 NEED FOR COVID-19 VACCINE: ICD-10-CM

## 2023-11-27 DIAGNOSIS — L98.9 SKIN LESION: ICD-10-CM

## 2023-11-27 LAB
HBA1C MFR BLD: 5.9 %
TSH SERPL DL<=0.005 MIU/L-ACNC: 2.2 UIU/ML (ref 0.3–4.2)
VIT D+METAB SERPL-MCNC: 58 NG/ML (ref 20–50)

## 2023-11-27 PROCEDURE — 99000 SPECIMEN HANDLING OFFICE-LAB: CPT | Performed by: PATHOLOGY

## 2023-11-27 PROCEDURE — 83036 HEMOGLOBIN GLYCOSYLATED A1C: CPT | Performed by: HOSPITALIST

## 2023-11-27 PROCEDURE — 90480 ADMN SARSCOV2 VAC 1/ONLY CMP: CPT | Performed by: HOSPITALIST

## 2023-11-27 PROCEDURE — 84443 ASSAY THYROID STIM HORMONE: CPT | Performed by: PATHOLOGY

## 2023-11-27 PROCEDURE — 91320 SARSCV2 VAC 30MCG TRS-SUC IM: CPT | Performed by: HOSPITALIST

## 2023-11-27 PROCEDURE — 36415 COLL VENOUS BLD VENIPUNCTURE: CPT | Performed by: PATHOLOGY

## 2023-11-27 PROCEDURE — 99214 OFFICE O/P EST MOD 30 MIN: CPT | Mod: 25 | Performed by: HOSPITALIST

## 2023-11-27 PROCEDURE — 82306 VITAMIN D 25 HYDROXY: CPT | Performed by: HOSPITALIST

## 2023-11-27 RX ORDER — CITALOPRAM HYDROBROMIDE 20 MG/1
20 TABLET ORAL DAILY
Qty: 90 TABLET | Refills: 1 | Status: SHIPPED | OUTPATIENT
Start: 2023-11-27 | End: 2024-05-22

## 2023-11-27 RX ORDER — RESPIRATORY SYNCYTIAL VIRUS VACCINE 120MCG/0.5
0.5 KIT INTRAMUSCULAR ONCE
Qty: 1 EACH | Refills: 0 | Status: CANCELLED | OUTPATIENT
Start: 2023-11-27 | End: 2023-11-27

## 2023-11-27 ASSESSMENT — ENCOUNTER SYMPTOMS
SHORTNESS OF BREATH: 0
FATIGUE: 1
FEVER: 0
DYSURIA: 0
CHILLS: 0
DIARRHEA: 0
ABDOMINAL PAIN: 0
CONSTIPATION: 0

## 2023-11-27 ASSESSMENT — PATIENT HEALTH QUESTIONNAIRE - PHQ9: SUM OF ALL RESPONSES TO PHQ QUESTIONS 1-9: 7

## 2023-11-27 NOTE — NURSING NOTE
Chief Complaint   Patient presents with    Hospital F/U     Pt here for ER visit follow up from 11/5/23 at [facility] ER for chest and back pain. Pt also would like referral for dermatology concern on lip. Pt also would like to discuss depression       Toshia Caruso received the COVID-19 2023-[address] Immunization in clinic today at the request of Dr. Musa.   Prior to injection, verified patient identity using patient's name and date of birth.  The immunization site was cleaned with an alcohol prep wipe.   Patient has no history of reaction to vaccines.    The following medication was given:     MEDICATION: COVID-19 2023-24 [place]nat[place] Pfizer Immunization    ROUTE: IM  SITE: Deltoid - Left  DOSE: 0.3 mL  LOT #: [id number]  : Pfizer  EXPIRATION DATE: 01/24/24  NDC#: 7987-8331-53   Was there drug waste? No    The immunization was given without incident--see immunization list for administration details.   No swelling or redness was observed at the site of injection after the immunization was given.  Due to injection administration, patient instructed to remain in clinic for 15 minutes  afterwards, and to report any adverse reaction to me immediately.    Drug Amount Wasted:  None.  Vial/Syringe: Single dose vial    Neva Chavez LPN  November 27, 2023  3:30 PM

## 2023-11-27 NOTE — PROGRESS NOTES
Toshia is a 78 year old that presents in clinic today for the following:     Chief Complaint   Patient presents with    Hospital F/U     Pt here for ER visit follow up from 11/5/23 at [facility] ER for chest and back pain. Pt also would like referral for dermatology concern on lip. Pt also would like to discuss depression           11/27/2023     2:19 PM   Additional Questions   Roomed by Karla Phan   Accompanied by Tommy-       Screenings as of today     PHQ-9 Total Score 7        Karla Phan at 2:28 PM on 11/27/2023

## 2023-11-27 NOTE — PROGRESS NOTES
Assessment/Plan  Problem List Items Addressed This Visit       Elevated BP without diagnosis of hypertension     - Recommended checking BP at home at rest and message with an update in a few weeks.          Vitamin D deficiency     - Recheck Vitamin D level.          Relevant Orders    Vitamin D Deficiency    Skin lesion     Likely benign nevi along left upper lip and left cheek.   - Referral to dermatology.          Relevant Orders    Adult Dermatology  Referral    Mild episode of recurrent major depressive disorder (H24) - Primary     PHQ9 score is 7 today.   - Will restart on Celexa 20mg daily. Patient had been on 40mg in the past.   - Keep follow up in February.   - Check TSH.         Relevant Medications    citalopram (CELEXA) 20 MG tablet    Other Relevant Orders    TSH with free T4 reflex (Completed)    Vitamin D Deficiency    Hemoglobin A1c     Other Visit Diagnoses       Need for COVID-19 vaccine        Relevant Orders    COVID-19 12+ (2023-24) (PFIZER) (Completed)            No results found for any visits on 11/27/23.    Health Maintenance Due   Topic Date Due    ANNUAL REVIEW OF  ORDERS  Never done    ADVANCE CARE PLANNING  Never done    DEPRESSION ACTION PLAN  Never done    RSV VACCINE (Pregnancy & 60+) (1 - 1-dose 60+ series) Never done    ZOSTER IMMUNIZATION (2 of 3) 11/14/2012    MEDICARE ANNUAL WELLNESS VISIT  06/02/2019    FALL RISK ASSESSMENT  09/19/2020    COVID-19 Vaccine (2 - 2023-24 season) 09/01/2023         Subjective  Patient had noticed a lesion on her left upper and lower lip a few months ago. Has not seen an increase in size. Also has a lesion along her left cheek she noticed.      mentions she may be depressed ever since coming off her depression medication a few years ago. Appears she was on Citalopram 40mg daily in the past. She was told be a previous provider that they prefer not to have older adults on depression medications and was weaned off.  has noticed  "some lethargy during the day, decrease interest in daily activity. Patient mentions her sleep is poor and varies between 4-7 hours at times. Was better when on the anti depressant in the past. She mentions she was on Zoloft in the past but decreased in effectiveness and she was started on Citalopram.           Review of Systems   Constitutional:  Positive for fatigue. Negative for chills and fever.   HENT:  Negative for congestion.    Respiratory:  Negative for shortness of breath.    Cardiovascular:  Positive for chest pain. Negative for peripheral edema.   Gastrointestinal:  Negative for abdominal pain, constipation and diarrhea.   Genitourinary:  Negative for dysuria.   Psychiatric/Behavioral:  Positive for mood changes.        History  Past Medical History:   Diagnosis Date    Depressive disorder early 2000's    have taken anti-depressants consistently.  Helps!    Dysthymic disorder 08/04/2011    Mixed hyperlipidemia     Obstructive sleep apnea 09/18/2012    TIA (transient ischemic attack) 2022    left sided symtpoms for 4 hours    Unspecified essential hypertension 09/13/2011       Past Surgical History:   Procedure Laterality Date    COLONOSCOPY N/A 4/28/2016    Procedure: COLONOSCOPY;  Surgeon: Victoria Mathis MD;  Location:  GI    COLONOSCOPY N/A 10/29/2019    Procedure: COLONOSCOPY, WITH POLYPECTOMY;  Surgeon: Perla Hay MD;  [facility]    LAMINECTOMY CERIVCAL POSTERIOR THREE+ LEVELS  2002    C3-7       Family History   Problem Relation Age of Onset    Diabetes Mother         lived in [place]    Thyroid Disease Mother 60        lived in [place] -diagnosed in mid-life    Diabetes Father         old age onset- controlled by diet.    C.A.D. Father         silent heart attacks    Glaucoma Father     Colon Cancer Father 70        treated with surgery, 6\" colon removed    Prostate Cancer Father         at 100 was diagnosed with cancer of the hips, metastasized from his " "prostrate.    Thyroid Disease Sister 30        hyperthyroidism, radiation, takes thyroid pill daily    Diabetes Maternal Grandfather         when Insulin treatment was initially started.  Lived in [place]    Sleep Apnea Daughter     Depression Daughter         taking meds for    Obesity Daughter         extremely overweight    Breast Cancer Other         my fathers side    Depression Other         from depression, anxiety, bipolar    Depression Other         struggled with depression from  graduation to age 26.  In counseling, not advised if he is taking meds    Mental Illness Other         my Mother's side had very odd siblings.  Compulsive, hoarders, & others significantly different from others I knew.       Social History     Tobacco Use    Smoking status: Never    Smokeless tobacco: Never   Substance Use Topics    Alcohol use: No     Comment: rare        Objective  BP (!) 172/76 (BP Location: Right arm, Patient Position: Sitting, Cuff Size: Adult Regular)   Pulse 78   Ht 1.702 m (5' 7.01\")   Wt 98.5 kg (217 lb 1.6 oz)   SpO2 98%   BMI 33.99 kg/m    Vitals taken by Kimo Musa MD    Physical Exam  Constitutional:       General: She is not in acute distress.     Appearance: She is not ill-appearing or toxic-appearing.   HENT:      Head: Normocephalic.   Eyes:      Conjunctiva/sclera: Conjunctivae normal.   Cardiovascular:      Rate and Rhythm: Regular rhythm.      Heart sounds: Normal heart sounds. No murmur heard.     No friction rub. No gallop.   Pulmonary:      Effort: Pulmonary effort is normal. No respiratory distress.      Breath sounds: Normal breath sounds. No wheezing, rhonchi or rales.   Musculoskeletal:      Right lower leg: No edema.      Left lower leg: No edema.   Skin:     Comments: Small linear light brown discoloration of skin without ulceration along upper corner of lip. Small light brown patch of skin, flat, without ulceration along left cheek.      Neurological:      Mental " Status: She is alert.   Psychiatric:         Mood and Affect: Mood normal.         Thought Content: Thought content normal.         25 minutes spent on the date of the encounter doing chart review, history and exam, documentation and further activities per the note.      Return in about 3 months (around 2/13/2024).    Patient's support system:     Kimo Musa MD  [facility] INTER[facility] MEDIC[facility] [place]

## 2023-11-27 NOTE — ASSESSMENT & PLAN NOTE
- Recommended checking BP at home at rest and message with an update in a few weeks.    Pt rambling and not making any sense  Attempt to reorient multiple times, observation maintained       Malena Connor RN  11/15/18 1623

## 2023-11-27 NOTE — PATIENT INSTRUCTIONS
- Referral to dermatology.   - Send a message to Dr. Musa on Blood pressures (goal is under 140/90) in the next 2 weeks.   - Will restart on Celexa 20mg daily.   - COVID19 vaccine today.   - Labs for A1c, TSH, and Vitamin D.       Follow up again on 2/13/24. Anticipate repeating PHQ9 at next visit.

## 2023-11-27 NOTE — ASSESSMENT & PLAN NOTE
PHQ9 score is 7 today.   - Will restart on Celexa 20mg daily. Patient had been on 40mg in the past.   - Keep follow up in February.   - Check TSH.

## 2023-11-28 ENCOUNTER — TELEPHONE (OUTPATIENT)
Dept: DERMATOLOGY | Facility: CLINIC | Age: 78
End: 2023-11-28
Payer: COMMERCIAL

## 2023-11-28 NOTE — TELEPHONE ENCOUNTER
This encounter is being sent to inform the clinic that this patient has a referral from Kimo Musa MD in [facility] for the diagnoses of Skin lesion [L98.9] and has requested that this patient be seen within 1 - 2 weeks..  Based on the availability of our provider(s), we are unable to accommodate this request.    Were all sites offered this patient?  Yes    Does scheduling algorithm request to schedule next available?  Patient has been scheduled for the first available opening with Dr. Carpio on 7/12/24.  We have informed the patient that the clinic will review their referral and reach out if a sooner appointment is medically necessary.

## 2024-02-08 ENCOUNTER — OFFICE VISIT (OUTPATIENT)
Dept: SLEEP MEDICINE | Facility: CLINIC | Age: 79
End: 2024-02-08
Payer: COMMERCIAL

## 2024-02-08 VITALS
SYSTOLIC BLOOD PRESSURE: 138 MMHG | WEIGHT: 210.2 LBS | HEART RATE: 96 BPM | BODY MASS INDEX: 33.78 KG/M2 | HEIGHT: 66 IN | DIASTOLIC BLOOD PRESSURE: 87 MMHG | OXYGEN SATURATION: 96 %

## 2024-02-08 DIAGNOSIS — G47.33 OBSTRUCTIVE SLEEP APNEA: Primary | Chronic | ICD-10-CM

## 2024-02-08 DIAGNOSIS — E66.811 CLASS 1 OBESITY DUE TO EXCESS CALORIES WITH SERIOUS COMORBIDITY AND BODY MASS INDEX (BMI) OF 33.0 TO 33.9 IN ADULT: Chronic | ICD-10-CM

## 2024-02-08 DIAGNOSIS — E66.09 CLASS 1 OBESITY DUE TO EXCESS CALORIES WITH SERIOUS COMORBIDITY AND BODY MASS INDEX (BMI) OF 33.0 TO 33.9 IN ADULT: Chronic | ICD-10-CM

## 2024-02-08 PROBLEM — E78.00 PURE HYPERCHOLESTEROLEMIA: Chronic | Status: ACTIVE | Noted: 2022-10-13

## 2024-02-08 PROBLEM — Z86.73 HISTORY OF TIA (TRANSIENT ISCHEMIC ATTACK): Chronic | Status: ACTIVE | Noted: 2022-10-13

## 2024-02-08 PROBLEM — Z86.73 HISTORY OF TIA (TRANSIENT ISCHEMIC ATTACK) AND STROKE: Status: ACTIVE | Noted: 2022-10-13

## 2024-02-08 PROBLEM — F33.0 MILD EPISODE OF RECURRENT MAJOR DEPRESSIVE DISORDER (H): Chronic | Status: ACTIVE | Noted: 2023-11-27

## 2024-02-08 PROBLEM — R74.01 TRANSAMINITIS: Status: RESOLVED | Noted: 2023-02-13 | Resolved: 2024-02-08

## 2024-02-08 PROCEDURE — 99213 OFFICE O/P EST LOW 20 MIN: CPT | Performed by: INTERNAL MEDICINE

## 2024-02-08 ASSESSMENT — SLEEP AND FATIGUE QUESTIONNAIRES
HOW LIKELY ARE YOU TO NOD OFF OR FALL ASLEEP WHILE SITTING AND TALKING TO SOMEONE: WOULD NEVER DOZE
HOW LIKELY ARE YOU TO NOD OFF OR FALL ASLEEP WHILE LYING DOWN TO REST IN THE AFTERNOON WHEN CIRCUMSTANCES PERMIT: SLIGHT CHANCE OF DOZING
HOW LIKELY ARE YOU TO NOD OFF OR FALL ASLEEP WHILE WATCHING TV: WOULD NEVER DOZE
HOW LIKELY ARE YOU TO NOD OFF OR FALL ASLEEP WHILE SITTING AND READING: WOULD NEVER DOZE
HOW LIKELY ARE YOU TO NOD OFF OR FALL ASLEEP WHILE SITTING INACTIVE IN A PUBLIC PLACE: SLIGHT CHANCE OF DOZING
HOW LIKELY ARE YOU TO NOD OFF OR FALL ASLEEP WHEN YOU ARE A PASSENGER IN A CAR FOR AN HOUR WITHOUT A BREAK: MODERATE CHANCE OF DOZING
HOW LIKELY ARE YOU TO NOD OFF OR FALL ASLEEP WHILE SITTING QUIETLY AFTER LUNCH WITHOUT ALCOHOL: WOULD NEVER DOZE
HOW LIKELY ARE YOU TO NOD OFF OR FALL ASLEEP IN A CAR, WHILE STOPPED FOR A FEW MINUTES IN TRAFFIC: WOULD NEVER DOZE

## 2024-02-08 NOTE — NURSING NOTE
"Chief Complaint   Patient presents with    CPAP Follow Up     Needs new cpap device.        Initial BP (!) 161/93   Pulse 96   Ht 1.68 m (5' 6.14\")   Wt 95.3 kg (210 lb 3.2 oz)   SpO2 96%   BMI 33.78 kg/m   Estimated body mass index is 33.78 kg/m  as calculated from the following:    Height as of this encounter: 1.68 m (5' 6.14\").    Weight as of this encounter: 95.3 kg (210 lb 3.2 oz).    Medication Reconciliation: complete    Neck circumference: 13.8 inches / 35 centimeters.    DME: [facility] Char Song CMA on 2/8/2024 at 2:06 PM     "

## 2024-02-08 NOTE — NURSING NOTE
Writer schedule a 3 month follow up with provider.     Faxed cpap orders and office visit notes to [facility].

## 2024-02-08 NOTE — PROGRESS NOTES
Sleep Apnea - Follow-up Visit:    Impression/Plan:     Moderate Sleep apnea.   Tolerating PAP well. Benefiting from treatment.    Machine malfunctioning, end-of-life warning  - Ordered replacement device at 10-14 cmH20         Toshia Caruso will follow up in about 2 month(s) after getting replacement device .       I spent 5 minutes with patient including counseling, and 20 minutes with chart review, and documentation       History of Present Illness:  No chief complaint on file.      Toshia Caruso presents for follow-up of their moderate sleep apnea, managed with CPAP.     D[facility]- [facility]     She reports she initially presented with ....     PSG [facility] 11/29/2015 (204 lbs)   - AHI 17, RDI 59, No REM on diagnostic. Lowest O2 SAT 89%  - CPAP titrated to 7    Patient was initially seen by Dr. Aquino in 2017 on CPAP 7 cmH20 with sleep maintenance difficulties, nonrestorative sleep and excessive daytime sleepiness (ESS 8) which was attributed to poor sleep hygiene    In 2019 her pressures were changed to 9-13 cmH20    She is getting a motor life exceeded message    Do you use a CPAP Machine at home: Yes  Overall, on a scale of 0-10 how would you rate your CPAP (0 poor, 10 great): 6    What type of mask do you use: Nasal Pillow with chin strap  Is your mask comfortable: Yes    Is your mask leaking: Yes  If yes, where do you feel it: near my cheek  How many night per week does the mask leak (0-7): 3 to5    Do you notice snoring with mask on: No  Do you notice gasping arousals with mask on: No  Are you having significant oral or nasal dryness: Yes      What is your typical bedtime: 10 to 1130  How long does it take you to go to sleep on PAP therapy: depends but not on the Cpap  What time do you typically get out of bed for the day: 6to 8  How many hours on average per night are you using PAP therapy: as many as i am in bed  How many hours are you sleeping per night: depends 4 to 8  Do you feel well rested in the  morning: Yes      ResMed   Auto-PAP 10 - 14 cmH2O 30 day usage data:    90% of days with > 4 hours of use. 0/30 days with no use.   Average use 7' 12 minutes per day.   95%ile Leak 9  L/min.   CPAP 95% pressure 13.9 cm. Median 13.9. Max 13/9  AHI 5.8 events per hour.   WANDER 2.7          EPWORTH SLEEPINESS SCALE         2/8/2024     1:49 PM    [place] Sleepiness Scale ( DANILO Sanchez  [id number]<br>ESS - [place]/English - Final version - 21 Nov 07 - [facility] [place].)   Sitting and reading Would never doze   Watching TV Would never doze   Sitting, inactive in a public place (e.g. a theatre or a meeting) Slight chance of dozing   As a passenger in a car for an hour without a break Moderate chance of dozing   Lying down to rest in the afternoon when circumstances permit Slight chance of dozing   Sitting and talking to someone Would never doze   Sitting quietly after a lunch without alcohol Would never doze   In a car, while stopped for a few minutes in traffic Would never doze   [place] Score (MC) 4   [place] Score (Sleep) 4       INSOMNIA SEVERITY INDEX (TEJ)          2/8/2024     1:43 PM   Insomnia Severity Index (TEJ)   Difficulty falling asleep 2   Difficulty staying asleep 2   Problems waking up too early 1   How SATISFIED/DISSATISFIED are you with your CURRENT sleep pattern? 2   How NOTICEABLE to others do you think your sleep problem is in terms of impairing the quality of your life? 1   How WORRIED/DISTRESSED are you about your current sleep problem? 1   To what extent do you consider your sleep problem to INTERFERE with your daily functioning (e.g. daytime fatigue, mood, ability to function at work/daily chores, concentration, memory, mood, etc.) CURRENTLY? 1   TEJ Total Score 10       Guidelines for Scoring/Interpretation:  Total score categories:  0-7 = No clinically significant insomnia   8-14 = Subthreshold insomnia   15-21 = Clinical insomnia (moderate severity)  22-28 = Clinical insomnia (severe)  Used via  courtesy of www.[place]health.[place].gov with permission from James Santos PhD., [facility]        Past medical/surgical history, family history, social history, medications and allergies were reviewed.        Problem List:  Patient Active Problem List    Diagnosis Date Noted    History of TIA (transient ischemic attack) 10/13/2022     Priority: High     Admitted on 9/22/2022 with complaints of numbness of left side including face, arm and leg.       Mild episode of recurrent major depressive disorder (H24) 11/27/2023     Priority: Medium    Hyperlipidemia 10/13/2022     Priority: Medium    Obstructive sleep apnea - moderate (AHI 17) 2009     Priority: Medium     PSG [facility] 11/29/2015 (204 lbs) - AHI 17, RDI 59, No REM on diagnostic. Lowest O2 SAT 89%      Vitamin D deficiency 11/27/2023     Priority: Low    Class 1 obesity due to excess calories with serious comorbidity and body mass index (BMI) of 33.0 to 33.9 in adult 09/11/2020     Priority: Low    Chronic pain of both knees 09/23/2019     Priority: Low    Constipation 03/07/2016     Priority: Low        There were no vitals taken for this visit.

## 2024-02-09 NOTE — NURSING NOTE
Received request for chart notes related to CPAP therapy from [facility].  Faxed them the office notes from Dr. Molina's appointment yesterday.  Kamila Leon, CMA

## 2024-02-13 ENCOUNTER — OFFICE VISIT (OUTPATIENT)
Dept: INTERNAL MEDICINE | Facility: CLINIC | Age: 79
End: 2024-02-13
Payer: COMMERCIAL

## 2024-02-13 VITALS
BODY MASS INDEX: 33.77 KG/M2 | WEIGHT: 210.1 LBS | OXYGEN SATURATION: 98 % | SYSTOLIC BLOOD PRESSURE: 143 MMHG | HEIGHT: 66 IN | HEART RATE: 81 BPM | DIASTOLIC BLOOD PRESSURE: 85 MMHG

## 2024-02-13 DIAGNOSIS — E66.09 CLASS 1 OBESITY DUE TO EXCESS CALORIES WITH SERIOUS COMORBIDITY AND BODY MASS INDEX (BMI) OF 33.0 TO 33.9 IN ADULT: Chronic | ICD-10-CM

## 2024-02-13 DIAGNOSIS — I10 PRIMARY HYPERTENSION: Primary | ICD-10-CM

## 2024-02-13 DIAGNOSIS — E66.811 CLASS 1 OBESITY DUE TO EXCESS CALORIES WITH SERIOUS COMORBIDITY AND BODY MASS INDEX (BMI) OF 33.0 TO 33.9 IN ADULT: Chronic | ICD-10-CM

## 2024-02-13 PROCEDURE — 99214 OFFICE O/P EST MOD 30 MIN: CPT | Performed by: HOSPITALIST

## 2024-02-13 RX ORDER — VITAMIN B COMPLEX
125 TABLET ORAL EVERY OTHER DAY
COMMUNITY
Start: 2024-02-13 | End: 2024-07-17

## 2024-02-13 RX ORDER — AMLODIPINE BESYLATE 2.5 MG/1
2.5 TABLET ORAL DAILY
Qty: 90 TABLET | Refills: 1 | Status: SHIPPED | OUTPATIENT
Start: 2024-02-13 | End: 2024-08-06

## 2024-02-13 ASSESSMENT — ENCOUNTER SYMPTOMS
CHILLS: 0
DIARRHEA: 0
SHORTNESS OF BREATH: 0
ABDOMINAL PAIN: 0
CONSTIPATION: 0
HEADACHES: 0
FEVER: 0
DYSURIA: 0

## 2024-02-13 NOTE — PROGRESS NOTES
Assessment/Plan  Problem List Items Addressed This Visit       Class 1 obesity due to excess calories with serious comorbidity and body mass index (BMI) of 33.0 to 33.9 in adult (Chronic)     - Patient currently eating breakfast and then does not eat until hungry for rest of the day. Has lost 7 lbs since November.   - Continue goal of at least 5 lbs weight loss per month. Eventual goal for now at 180 lbs.   - Encouraged some walking during the day.          Primary hypertension - Primary     - Will add amlodipine 2.5mg daily.         Relevant Medications    amLODIPine (NORVASC) 2.5 MG tablet       No results found for any visits on 02/13/24.    Health Maintenance Due   Topic Date Due    ANNUAL REVIEW OF  ORDERS  Never done    ADVANCE CARE PLANNING  Never done    DEPRESSION ACTION PLAN  Never done    RSV VACCINE (Pregnancy & 60+) (1 - 1-dose 60+ series) Never done    ZOSTER IMMUNIZATION (2 of 3) 11/14/2012    MEDICARE ANNUAL WELLNESS VISIT  06/02/2019    FALL RISK ASSESSMENT  09/19/2020         Subjective  Mention she is working on snacks. She has breakfast and then doesn't eat again until she is hungry. Has decreased by 8 lbs the past month and a half. Before she was making snacks for her  and she would eat along with him. No other issues currently.         Review of Systems   Constitutional:  Negative for chills and fever.   Eyes:  Negative for visual disturbance.   Respiratory:  Negative for shortness of breath.    Cardiovascular:  Negative for chest pain and peripheral edema.   Gastrointestinal:  Negative for abdominal pain, constipation and diarrhea.   Genitourinary:  Negative for dysuria.   Skin:  Negative for rash.   Neurological:  Negative for headaches.   Psychiatric/Behavioral:  Negative for mood changes.        History  Past Medical History:   Diagnosis Date    Contact dermatitis of eyelid, unspecified laterality 12/13/2015    Depression     Infection due to 2019 novel coronavirus 05/11/2023     "Mixed hyperlipidemia     Obstructive sleep apnea     Small bowel obstruction (H) 06/05/2022    TIA (transient ischemic attack) 2022    left sided symtpoms for 4 hours    Transaminitis 02/13/2023       Past Surgical History:   Procedure Laterality Date    COLONOSCOPY N/A 4/28/2016    Procedure: COLONOSCOPY;  Surgeon: Victoria Mathis MD;  Location:  GI    COLONOSCOPY N/A 10/29/2019    Procedure: COLONOSCOPY, WITH POLYPECTOMY;  Surgeon: Perla Hay MD;  [facility]    LAMINECTOMY CERIVCAL POSTERIOR THREE+ LEVELS  2002    C3-7       Family History   Problem Relation Age of Onset    Diabetes Mother         lived in [place]    Thyroid Disease Mother 60        lived in [place] -diagnosed in mid-life    Diabetes Father         old age onset- controlled by diet.    C.A.D. Father         silent heart attacks    Glaucoma Father     Colon Cancer Father 70        treated with surgery, 6\" colon removed    Prostate Cancer Father         at 100 was diagnosed with cancer of the hips, metastasized from his prostrate.    Thyroid Disease Sister 30        hyperthyroidism, radiation, takes thyroid pill daily    Diabetes Maternal Grandfather         when Insulin treatment was initially started.  Lived in [place]    Sleep Apnea Daughter     Depression Daughter         taking meds for    Obesity Daughter         extremely overweight    Breast Cancer Other         my fathers side    Depression Other         from depression, anxiety, bipolar    Depression Other         struggled with depression from HS graduation to age 26.  In counseling, not advised if he is taking meds    Mental Illness Other         my Mother's side had very odd siblings.  Compulsive, hoarders, & others significantly different from others I knew.       Social History     Tobacco Use    Smoking status: Never    Smokeless tobacco: Never   Substance Use Topics    Alcohol use: No     Comment: rare        Objective  BP (!) 143/85 (BP Location: " "Right arm, Patient Position: Sitting, Cuff Size: Adult Large)   Pulse 81   Ht 1.68 m (5' 6.14\")   Wt 95.3 kg (210 lb 1.6 oz)   SpO2 98%   BMI 33.77 kg/m    Vitals taken by Kimo Musa MD    Physical Exam  Constitutional:       General: She is not in acute distress.     Appearance: She is not ill-appearing or toxic-appearing.   HENT:      Head: Normocephalic.   Eyes:      Conjunctiva/sclera: Conjunctivae normal.   Cardiovascular:      Rate and Rhythm: Regular rhythm.      Heart sounds: Normal heart sounds. No murmur heard.     No friction rub. No gallop.   Pulmonary:      Effort: Pulmonary effort is normal. No respiratory distress.      Breath sounds: Normal breath sounds. No wheezing, rhonchi or rales.   Musculoskeletal:      Right lower leg: No edema.      Left lower leg: No edema.   Skin:     General: Skin is warm and dry.      Findings: No rash.   Neurological:      Mental Status: She is alert.   Psychiatric:         Mood and Affect: Mood normal.         Thought Content: Thought content normal.         20 minutes spent on the date of the encounter doing chart review, history and exam, documentation and further activities per the note.      Return in about 6 months (around 8/13/2024).        Kimo Musa MD  [facility] INTERNAL MEDICINE [place]    "

## 2024-02-13 NOTE — ASSESSMENT & PLAN NOTE
- Patient currently eating breakfast and then does not eat until hungry for rest of the day. Has lost 7 lbs since November.   - Continue goal of at least 5 lbs weight loss per month. Eventual goal for now at 180 lbs.   - Encouraged some walking during the day.

## 2024-02-13 NOTE — PROGRESS NOTES
Toshia is a 78 year old that presents in clinic today for the following:     Chief Complaint   Patient presents with    RECHECK     Routine follow up           2/13/2024     2:33 PM   Additional Questions   Roomed by KTE     Screenings from encounters over the past 10 days    No data recorded       Addy Zamudio CMA at 2:35 PM on 2/13/2024

## 2024-02-13 NOTE — PATIENT INSTRUCTIONS
- Will add amlodipine 2.5mg daily for now.   - Continue goal of at least 5 lbs weight loss per month. Eventual goal for now at 180 lbs.     Follow up again in 6 months.

## 2024-03-28 DIAGNOSIS — E78.00 PURE HYPERCHOLESTEROLEMIA: ICD-10-CM

## 2024-03-28 DIAGNOSIS — Z86.73 HISTORY OF TIA (TRANSIENT ISCHEMIC ATTACK) AND STROKE: ICD-10-CM

## 2024-04-02 NOTE — TELEPHONE ENCOUNTER
rosuvastatin (CRESTOR) 10 MG tablet 90 tablet 3 5/31/2023       Last Office Visit: 2/13/24    Future Office visit:   8/13/24      Antihyperlipidemic agents Tsyabs6003/28/2024 01:03 AM   Protocol Details LDL on file in the past 12 months        Component      Latest Ref Rng 2/6/2023  7:36 AM   Cholesterol      <200 mg/dL 178    Triglycerides      <150 mg/dL 64    HDL Cholesterol      >=50 mg/dL 89    LDL Cholesterol Calculated      <=100 mg/dL 76    Non HDL Cholesterol      <130 mg/dL 89        Routing refill request to provider for review/approval because:  Overdue lab per protocol    Gem Escamilla RN  P Red Flag Triage/MRT

## 2024-04-03 RX ORDER — ROSUVASTATIN CALCIUM 10 MG/1
10 TABLET, COATED ORAL DAILY
Qty: 90 TABLET | Refills: 3 | Status: SHIPPED | OUTPATIENT
Start: 2024-04-03

## 2024-05-13 DIAGNOSIS — F33.0 MILD EPISODE OF RECURRENT MAJOR DEPRESSIVE DISORDER (H): ICD-10-CM

## 2024-05-22 RX ORDER — CITALOPRAM HYDROBROMIDE 20 MG/1
20 TABLET ORAL DAILY
Qty: 90 TABLET | Refills: 0 | Status: SHIPPED | OUTPATIENT
Start: 2024-05-22 | End: 2024-08-13

## 2024-05-22 NOTE — TELEPHONE ENCOUNTER
LCV:2/13/2024  [facility] Internal Medicine [place]  overdue PHQ9 --FYI to clinic  RF 90 day  NCV: 8-13-24

## 2024-06-01 ENCOUNTER — HEALTH MAINTENANCE LETTER (OUTPATIENT)
Age: 79
End: 2024-06-01

## 2024-07-16 ASSESSMENT — SLEEP AND FATIGUE QUESTIONNAIRES
HOW LIKELY ARE YOU TO NOD OFF OR FALL ASLEEP WHILE SITTING AND READING: MODERATE CHANCE OF DOZING
HOW LIKELY ARE YOU TO NOD OFF OR FALL ASLEEP WHILE SITTING AND TALKING TO SOMEONE: WOULD NEVER DOZE
HOW LIKELY ARE YOU TO NOD OFF OR FALL ASLEEP WHILE SITTING INACTIVE IN A PUBLIC PLACE: SLIGHT CHANCE OF DOZING
HOW LIKELY ARE YOU TO NOD OFF OR FALL ASLEEP WHILE WATCHING TV: SLIGHT CHANCE OF DOZING
HOW LIKELY ARE YOU TO NOD OFF OR FALL ASLEEP WHEN YOU ARE A PASSENGER IN A CAR FOR AN HOUR WITHOUT A BREAK: SLIGHT CHANCE OF DOZING
HOW LIKELY ARE YOU TO NOD OFF OR FALL ASLEEP WHILE LYING DOWN TO REST IN THE AFTERNOON WHEN CIRCUMSTANCES PERMIT: HIGH CHANCE OF DOZING
HOW LIKELY ARE YOU TO NOD OFF OR FALL ASLEEP IN A CAR, WHILE STOPPED FOR A FEW MINUTES IN TRAFFIC: WOULD NEVER DOZE
HOW LIKELY ARE YOU TO NOD OFF OR FALL ASLEEP WHILE SITTING QUIETLY AFTER LUNCH WITHOUT ALCOHOL: WOULD NEVER DOZE

## 2024-07-17 ENCOUNTER — OFFICE VISIT (OUTPATIENT)
Dept: SLEEP MEDICINE | Facility: CLINIC | Age: 79
End: 2024-07-17
Payer: COMMERCIAL

## 2024-07-17 VITALS
DIASTOLIC BLOOD PRESSURE: 82 MMHG | HEIGHT: 66 IN | RESPIRATION RATE: 16 BRPM | BODY MASS INDEX: 33.62 KG/M2 | WEIGHT: 209.2 LBS | OXYGEN SATURATION: 96 % | SYSTOLIC BLOOD PRESSURE: 138 MMHG | HEART RATE: 78 BPM

## 2024-07-17 DIAGNOSIS — E66.09 CLASS 1 OBESITY DUE TO EXCESS CALORIES WITH SERIOUS COMORBIDITY AND BODY MASS INDEX (BMI) OF 33.0 TO 33.9 IN ADULT: Chronic | ICD-10-CM

## 2024-07-17 DIAGNOSIS — E66.811 CLASS 1 OBESITY DUE TO EXCESS CALORIES WITH SERIOUS COMORBIDITY AND BODY MASS INDEX (BMI) OF 33.0 TO 33.9 IN ADULT: Chronic | ICD-10-CM

## 2024-07-17 DIAGNOSIS — G47.33 OBSTRUCTIVE SLEEP APNEA: Primary | Chronic | ICD-10-CM

## 2024-07-17 PROBLEM — I10 PRIMARY HYPERTENSION: Chronic | Status: ACTIVE | Noted: 2024-02-13

## 2024-07-17 PROCEDURE — 99214 OFFICE O/P EST MOD 30 MIN: CPT | Performed by: INTERNAL MEDICINE

## 2024-07-17 NOTE — PROGRESS NOTES
Sleep Apnea - Follow-up Visit:    Impression/Plan:     Moderate Sleep apnea.   Tolerating PAP well. Benefiting from treatment.     - Continue current treatments.       Toshia Caruso will follow up in about 2 year(s).     I spent 10 minutes with patient including counseling, and 5 minutes with chart review, and documentation         History of Present Illness:  Chief Complaint   Patient presents with    CPAP Follow Up     New CPAP compliance       Toshia Caruso presents for follow-up of their moderate sleep apnea, managed with CPAP.     [facility]     She reports she initially presented with socially disruptive snoring, daytime tiredness     PSG Split [facility] 11/29/2015 (204 lbs)   - AHI 17, RDI 59, No REM on diagnostic. Lowest O2 SAT 89%  - CPAP titrated to 7    Patient was initially seen by Dr. Aquino in 2017 on CPAP 7 cmH20 with sleep maintenance difficulties, nonrestorative sleep and excessive daytime sleepiness (ESS 8) which was attributed to poor sleep hygiene    In 2019 her pressures were changed to 9-13 cmH20    She received a new device from [facility] 2/2024    She gets oral leaking, even with a chinstrap  She had problems with full-face mask moving her teeth     She received a new device from [facility] 2/2024    Do you use a CPAP Machine at home: Yes  Overall, on a scale of 0-10 how would you rate your CPAP (0 poor, 10 great): 8    What type of mask do you use: Nasal Pillow  Is your mask comfortable: Yes    Is your mask leaking: Yes  If yes, where do you feel it: occasionally, round the nasal pillows.  Using medium, have tried large.  Both seem to have their own issues.  How many night per week does the mask leak (0-7): almost every night I am repositioning it sometime.  The repositioning often solves the problem, sometimes it does not and I struggle for the rest of the night.    Do you notice snoring with mask on: No  Do you notice gasping arousals with mask on: No  Are you having  significant oral or nasal dryness: Yes  Is the pressure setting comfortable: Yes    What is your typical bedtime: between 10 and 11 pm  How long does it take you to go to sleep on PAP therapy: It depends on the night.  Generally with half an hour.  What time do you typically get out of bed for the day: between 6 and 7 am  How many hours on average per night are you using PAP therapy: as long as I am in bed.  Sometimes I move to the recliner, and don't have it there.  How many hours are you sleeping per night: Not certain.  I would guess 7.   Occasionally I wake up tired, already.  Do you feel well rested in the morning: Yes      ResMed   Auto-PAP 10 - 14 cmH2O 30 day usage data:    97% of days with > 4 hours of use. 0/30 days with no use.   Average use 7' 35 minutes per day.   95%ile Leak 57 L/min.   CPAP 95% pressure 13.8 cm. Median 12.6  AHI 5.1  WANDER 1.8 events per hour.              EPWORTH SLEEPINESS SCALE         7/17/2024     1:00 PM    [place] Sleepiness Scale ( DANILO Sanchez  [id number]<br>ESS - [place]/English - Final version - 21 Nov 07 - [facility] [place].)   [place] Score (Sleep) 8       INSOMNIA SEVERITY INDEX (TEJ)          7/17/2024     1:00 PM   Insomnia Severity Index (TEJ)   Difficulty falling asleep 1   Difficulty staying asleep 1   Problems waking up too early 1   How SATISFIED/DISSATISFIED are you with your CURRENT sleep pattern? 1   How NOTICEABLE to others do you think your sleep problem is in terms of impairing the quality of your life? 1   How WORRIED/DISTRESSED are you about your current sleep problem? 0   To what extent do you consider your sleep problem to INTERFERE with your daily functioning (e.g. daytime fatigue, mood, ability to function at work/daily chores, concentration, memory, mood, etc.) CURRENTLY? 1   TEJ Total Score 6       Guidelines for Scoring/Interpretation:  Total score categories:  0-7 = No clinically significant insomnia   8-14 = Subthreshold insomnia   15-21 = Clinical  "insomnia (moderate severity)  22-28 = Clinical insomnia (severe)  Used via courtesy of www.myhealth.[place].gov with permission from James Santos PhD., [facility]        Past medical/surgical history, family history, social history, medications and allergies were reviewed.        Problem List:  Patient Active Problem List    Diagnosis Date Noted    History of TIA (transient ischemic attack) 10/13/2022     Priority: High     Admitted on 9/22/2022 with complaints of numbness of left side including face, arm and leg.       Primary hypertension 02/13/2024     Priority: Medium    Mild episode of recurrent major depressive disorder (H24) 11/27/2023     Priority: Medium    Hyperlipidemia 10/13/2022     Priority: Medium    Obstructive sleep apnea - moderate (AHI 17) 2009     Priority: Medium     PSG Split [facility] 11/29/2015 (204 lbs) - AHI 17, RDI 59, No REM on diagnostic. Lowest O2 SAT 89%      Vitamin D deficiency 11/27/2023     Priority: Low    Class 1 obesity due to excess calories with serious comorbidity and body mass index (BMI) of 33.0 to 33.9 in adult 09/11/2020     Priority: Low    Chronic pain of both knees 09/23/2019     Priority: Low    Constipation 03/07/2016     Priority: Low        /82   Pulse 78   Resp 16   Ht 1.683 m (5' 6.25\")   Wt 94.9 kg (209 lb 3.2 oz)   SpO2 96%   BMI 33.51 kg/m      "

## 2024-07-17 NOTE — NURSING NOTE
"Chief Complaint   Patient presents with    CPAP Follow Up     New CPAP compliance       Initial /82   Pulse 78   Resp 16   Ht 1.683 m (5' 6.25\")   Wt 94.9 kg (209 lb 3.2 oz)   SpO2 96%   BMI 33.51 kg/m   Estimated body mass index is 33.51 kg/m  as calculated from the following:    Height as of this encounter: 1.683 m (5' 6.25\").    Weight as of this encounter: 94.9 kg (209 lb 3.2 oz).    Medication Reconciliation: complete  ESS: 8  Neck circumference: 13.75 inches / 35 centimeters.  DME: Char ROBERTS, LEI      "

## 2024-08-01 DIAGNOSIS — I10 PRIMARY HYPERTENSION: ICD-10-CM

## 2024-08-06 RX ORDER — AMLODIPINE BESYLATE 2.5 MG/1
2.5 TABLET ORAL DAILY
Qty: 90 TABLET | Refills: 1 | Status: SHIPPED | OUTPATIENT
Start: 2024-08-06 | End: 2024-08-13

## 2024-08-06 NOTE — TELEPHONE ENCOUNTER
amLODIPine (NORVASC) 2.5 MG tablet       Last Written Prescription Date:  2/13/24  Last Fill Quantity: 90,   # refills: 1  Last Office Visit : 2/13/24  Future Office visit:  8/13/24    Refilled per protocol    Ramona WHITMORE RN  [facility]/Red Flag Triage & Med Refill Team

## 2024-08-12 ASSESSMENT — PATIENT HEALTH QUESTIONNAIRE - PHQ9
SUM OF ALL RESPONSES TO PHQ QUESTIONS 1-9: 5
SUM OF ALL RESPONSES TO PHQ QUESTIONS 1-9: 5
10. IF YOU CHECKED OFF ANY PROBLEMS, HOW DIFFICULT HAVE THESE PROBLEMS MADE IT FOR YOU TO DO YOUR WORK, TAKE CARE OF THINGS AT HOME, OR GET ALONG WITH OTHER PEOPLE: SOMEWHAT DIFFICULT

## 2024-08-13 ENCOUNTER — LAB (OUTPATIENT)
Dept: LAB | Facility: CLINIC | Age: 79
End: 2024-08-13
Payer: COMMERCIAL

## 2024-08-13 ENCOUNTER — OFFICE VISIT (OUTPATIENT)
Dept: INTERNAL MEDICINE | Facility: CLINIC | Age: 79
End: 2024-08-13
Payer: COMMERCIAL

## 2024-08-13 VITALS
WEIGHT: 209.5 LBS | OXYGEN SATURATION: 97 % | BODY MASS INDEX: 33.67 KG/M2 | HEART RATE: 98 BPM | SYSTOLIC BLOOD PRESSURE: 132 MMHG | DIASTOLIC BLOOD PRESSURE: 81 MMHG | HEIGHT: 66 IN

## 2024-08-13 DIAGNOSIS — F33.0 MILD EPISODE OF RECURRENT MAJOR DEPRESSIVE DISORDER (H): ICD-10-CM

## 2024-08-13 DIAGNOSIS — I10 PRIMARY HYPERTENSION: ICD-10-CM

## 2024-08-13 DIAGNOSIS — E78.00 PURE HYPERCHOLESTEROLEMIA: ICD-10-CM

## 2024-08-13 DIAGNOSIS — R73.03 PREDIABETES: ICD-10-CM

## 2024-08-13 DIAGNOSIS — L29.9 ITCH OF SKIN: ICD-10-CM

## 2024-08-13 DIAGNOSIS — Z12.31 ENCOUNTER FOR SCREENING MAMMOGRAM FOR BREAST CANCER: ICD-10-CM

## 2024-08-13 DIAGNOSIS — Z12.11 COLON CANCER SCREENING: ICD-10-CM

## 2024-08-13 DIAGNOSIS — E55.9 VITAMIN D DEFICIENCY: Primary | ICD-10-CM

## 2024-08-13 DIAGNOSIS — E55.9 VITAMIN D DEFICIENCY: ICD-10-CM

## 2024-08-13 LAB
ALBUMIN SERPL BCG-MCNC: 4.2 G/DL (ref 3.5–5.2)
ALP SERPL-CCNC: 72 U/L (ref 40–150)
ALT SERPL W P-5'-P-CCNC: 11 U/L (ref 0–50)
ANION GAP SERPL CALCULATED.3IONS-SCNC: 9 MMOL/L (ref 7–15)
AST SERPL W P-5'-P-CCNC: 17 U/L (ref 0–45)
BILIRUB SERPL-MCNC: 0.3 MG/DL
BUN SERPL-MCNC: 14.4 MG/DL (ref 8–23)
CALCIUM SERPL-MCNC: 9.4 MG/DL (ref 8.8–10.4)
CHLORIDE SERPL-SCNC: 107 MMOL/L (ref 98–107)
CHOLEST SERPL-MCNC: 173 MG/DL
CREAT SERPL-MCNC: 0.75 MG/DL (ref 0.51–0.95)
EGFRCR SERPLBLD CKD-EPI 2021: 82 ML/MIN/1.73M2
FASTING STATUS PATIENT QL REPORTED: NO
FASTING STATUS PATIENT QL REPORTED: NO
GLUCOSE SERPL-MCNC: 131 MG/DL (ref 70–99)
HBA1C MFR BLD: 5.9 %
HCO3 SERPL-SCNC: 26 MMOL/L (ref 22–29)
HDLC SERPL-MCNC: 88 MG/DL
LDLC SERPL CALC-MCNC: 70 MG/DL
NONHDLC SERPL-MCNC: 85 MG/DL
POTASSIUM SERPL-SCNC: 4.2 MMOL/L (ref 3.4–5.3)
PROT SERPL-MCNC: 6.6 G/DL (ref 6.4–8.3)
SODIUM SERPL-SCNC: 142 MMOL/L (ref 135–145)
TRIGL SERPL-MCNC: 73 MG/DL
VIT D+METAB SERPL-MCNC: 46 NG/ML (ref 20–50)

## 2024-08-13 PROCEDURE — 36415 COLL VENOUS BLD VENIPUNCTURE: CPT | Performed by: PATHOLOGY

## 2024-08-13 PROCEDURE — 99000 SPECIMEN HANDLING OFFICE-LAB: CPT | Performed by: PATHOLOGY

## 2024-08-13 PROCEDURE — 82306 VITAMIN D 25 HYDROXY: CPT | Performed by: HOSPITALIST

## 2024-08-13 PROCEDURE — 80061 LIPID PANEL: CPT | Performed by: PATHOLOGY

## 2024-08-13 PROCEDURE — 83036 HEMOGLOBIN GLYCOSYLATED A1C: CPT | Performed by: HOSPITALIST

## 2024-08-13 PROCEDURE — 99214 OFFICE O/P EST MOD 30 MIN: CPT | Performed by: HOSPITALIST

## 2024-08-13 PROCEDURE — 80053 COMPREHEN METABOLIC PANEL: CPT | Performed by: PATHOLOGY

## 2024-08-13 RX ORDER — AMLODIPINE BESYLATE 2.5 MG/1
2.5 TABLET ORAL DAILY
Qty: 90 TABLET | Refills: 3 | Status: SHIPPED | OUTPATIENT
Start: 2024-08-13

## 2024-08-13 RX ORDER — CITALOPRAM HYDROBROMIDE 20 MG/1
20 TABLET ORAL DAILY
Qty: 90 TABLET | Refills: 3 | Status: SHIPPED | OUTPATIENT
Start: 2024-08-13

## 2024-08-13 NOTE — PROGRESS NOTES
"  Assessment & Plan   Problem List Items Addressed This Visit       Primary hypertension (Chronic)     /81 today.   - Refilling amlodipine 2.5mg daily.          Relevant Medications    amLODIPine (NORVASC) 2.5 MG tablet    Mild episode of recurrent major depressive disorder (H24) (Chronic)     - Refilling celexa.          Relevant Medications    citalopram (CELEXA) 20 MG tablet    Hyperlipidemia (Chronic)     Has a history of TIA/CVA. Goal LDL under 70 for now.   - Continued on rosuvastatin 10mg daily and omega 3 daily.   - Recheck CMP and lipid today, non fasting.          Relevant Orders    Lipid panel reflex to direct LDL Non-fasting (Completed)    Comprehensive metabolic panel (BMP + Alb, Alk Phos, ALT, AST, Total. Bili, TP) (Completed)    Vitamin D deficiency - Primary     - Check vitamin D level.          Relevant Orders    Vitamin D Deficiency (Completed)    Prediabetes     - Recheck A1c lab.          Relevant Orders    Hemoglobin A1c (Completed)    Itch of skin     Has flaky and slightly thickened skin along outer right ear, likely hyperkeratosis with slight irritation.   - Patient to apply hydrocortisone OTC twice a day for 1 week for now.          Colon cancer screening     Last colonoscopy was in 10/2019, recommended to repeat after 5 years.   - Order for colonoscopy repeat after 11/1/24.          Relevant Orders    Colonoscopy Screening  Referral     Other Visit Diagnoses       Encounter for screening mammogram for breast cancer        Relevant Orders    MA Screen Bilateral w/Sheldon                  BMI  Estimated body mass index is 33.55 kg/m  as calculated from the following:    Height as of this encounter: 1.683 m (5' 6.26\").    Weight as of this encounter: 95 kg (209 lb 8 oz).           Return in about 1 year (around 8/13/2025).      Padmini Pope is a 78 year old, presenting for the following health issues:  RECHECK (Spot on right ear, high blood pressure)        8/13/2024     1:26 " "PM   Additional Questions   Roomed by KTE, EMT     History of Present Illness       Hypertension: She presents for follow up of hypertension.  She does not check blood pressure  regularly outside of the clinic. Outside blood pressures have been over 140/90. She does not follow a low salt diet.     She eats 0-1 servings of fruits and vegetables daily.She consumes 0 sweetened beverage(s) daily.She exercises with enough effort to increase her heart rate 20 to 29 minutes per day.  She exercises with enough effort to increase her heart rate 4 days per week.   She is taking medications regularly.     Started with a pimple of right ear for about 4 months ago and continues to itch. Use emollient \"Maria T K\" which helps but doesn't make it go away. Has tried vicks and tea tree oil but no benefit.     Had a question of vitamin D that was high. She stopped her vitamin D about 4-5 months. Was taking vitamin D with vitamin K in the past, was on 1,000 of vitamin D daily in the past.     Patient was wondering about checking for diabetes. Had grandparents with diabetes around the age she had today.     Wants to continue with mammogram. Mom had fibroid breasts. Dad had colon cancer.     She will be going to [facility] in [place] with family in October. Plans to take a helicopter ride this time.     Still continues with constipation. Taking 3 senna a day. If no movement for several days, she will take 5 senna once. Occasionally she will use metamucil.         Review of Systems  Constitutional, neuro, ENT, endocrine, pulmonary, cardiac, gastrointestinal, genitourinary, musculoskeletal, integument and psychiatric systems are negative, except as otherwise noted.      Objective    /81 (BP Location: Right arm, Patient Position: Sitting, Cuff Size: Adult Large)   Pulse 98   Ht 1.683 m (5' 6.26\")   Wt 95 kg (209 lb 8 oz)   SpO2 97%   BMI 33.55 kg/m    Body mass index is 33.55 kg/m .  Physical Exam   GENERAL: alert and no distress  EYES: " Eyes grossly normal to inspection, PERRL and conjunctivae and sclerae normal  HENT: right outer ear inferior to opening of canal with slight thickened and flaky skin, nose and mouth without ulcers or lesions  RESP: lungs clear to auscultation - no rales, rhonchi or wheezes  CV: regular rate and rhythm, normal S1 S2, no S3 or S4, no murmur, click or rub, no peripheral edema  ABDOMEN: soft, nontender, no hepatosplenomegaly, no masses and bowel sounds normal  MS: no gross musculoskeletal defects noted, no edema  SKIN: no rashes  NEURO: Normal strength and tone, mentation intact and speech normal  PSYCH: mentation appears normal, affect normal/bright            Signed Electronically by: Kimo Musa MD

## 2024-08-13 NOTE — PATIENT INSTRUCTIONS
- Check labs for Vitamin D, Lipid (non fasting), CMP and A1c.   - Ordered for Mammogram and Colonoscopy repeat. Would obtain after November 1st of 2024.   - Will refill amlodipine and celexa.   - May consider adding miralax twice a day for constipation concerns.   - Would use hydrocortisone cream twice a day for 1 week to right ear.     Follow up again in 1 year

## 2024-08-15 PROBLEM — Z12.11 COLON CANCER SCREENING: Status: ACTIVE | Noted: 2024-08-15

## 2024-08-15 PROBLEM — L29.9 ITCH OF SKIN: Status: ACTIVE | Noted: 2024-08-15

## 2024-08-15 PROBLEM — R73.03 PREDIABETES: Status: ACTIVE | Noted: 2024-08-15

## 2024-08-15 NOTE — ASSESSMENT & PLAN NOTE
Last colonoscopy was in 10/2019, recommended to repeat after 5 years.   - Order for colonoscopy repeat after 11/1/24.

## 2024-08-15 NOTE — ASSESSMENT & PLAN NOTE
- Recheck A1c lab.    [FreeTextEntry1] : \par The patient is an 81 year  old woman who was recently noted on screening mammogram (2/11/2021) to have asymmetry in the left breast and calcifications in the right breast.  Diagnostic mammogram and ultrasound on 2/12/2021 showed amorphous calcifications in a somewhat linear distribution in the lower outer quadrant of the right breast.  Stereotactic biopsy recommended. In the left breast, indistinct mass.  A targeted b/l breast ultrasound shows, at 1 o'clock, left breast, 10 cm FN, there is an irregular hypoechoic mass measuring 0.4 X 0.6 X 0.6 cm. Enlarged right axillary lymph nodes are consistent with the patient's recent COVID-19 vaccination in the right arm.  recommendations:  Right breast, stereotactic guided biopsy.  Left breast, ultrasound guided biopsy.  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   It was ER/NE positive /HER 2 negative.  Ki-67 index was 5 %.  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   \par \par She is here to discuss radiation ([facility] 4/9/2021)\par DCISion RT score is 2.9 (low).\par Med/Onc: \par Sx: Dr. Croft\par

## 2024-08-15 NOTE — ASSESSMENT & PLAN NOTE
Has flaky and slightly thickened skin along outer right ear, likely hyperkeratosis with slight irritation.   - Patient to apply hydrocortisone OTC twice a day for 1 week for now.

## 2024-08-15 NOTE — ASSESSMENT & PLAN NOTE
Has a history of TIA/CVA. Goal LDL under 70 for now.   - Continued on rosuvastatin 10mg daily and omega 3 daily.   - Recheck CMP and lipid today, non fasting.

## 2024-08-20 ENCOUNTER — TELEPHONE (OUTPATIENT)
Dept: GASTROENTEROLOGY | Facility: CLINIC | Age: 79
End: 2024-08-20
Payer: COMMERCIAL

## 2024-08-20 ENCOUNTER — TELEPHONE (OUTPATIENT)
Dept: INTERNAL MEDICINE | Facility: CLINIC | Age: 79
End: 2024-08-20
Payer: COMMERCIAL

## 2024-08-20 NOTE — TELEPHONE ENCOUNTER
"Endoscopy Scheduling Screen    Have you had a positive Covid test in the last 14 days?  No    What is your communication preference for Instructions and/or Bowel Prep?   MyChart    What insurance is in the chart?  Other:  BCBS/medicare    Ordering/Referring Provider: MADELAINE DOWD    (If ordering provider performs procedure, schedule with ordering provider unless otherwise instructed. )    BMI: Estimated body mass index is 33.55 kg/m  as calculated from the following:    Height as of 8/13/24: 1.683 m (5' 6.26\").    Weight as of 8/13/24: 95 kg (209 lb 8 oz).     Sedation Ordered  moderate sedation.   If patient BMI > 50 do not schedule in [facility].    If patient BMI > 45 do not schedule at [facility].    Are you taking methadone or Suboxone?  No    Have you had difficulties, pain, or discomfort during past endoscopy procedures?  No    Are you taking any prescription medications for pain 3 or more times per week?   NO, No RN review required.    Do you have a history of malignant hyperthermia?  No    (Females) Are you currently pregnant?   No     Have you been diagnosed or told you have pulmonary hypertension?   No    Do you have an LVAD?  No    Have you been told you have moderate to severe sleep apnea?  Yes (RN Review required for scheduling unless scheduling in Hospital.)  cpap  Have you been told you have COPD, asthma, or any other lung disease?  No    Do you have any heart conditions?  No     Have you ever had or are you waiting for an organ transplant?  No. Continue scheduling, no site restrictions.    Have you had a stroke or transient ischemic attack (TIA aka \"mini stroke\" in the last 6 months?   No    Have you been diagnosed with or been told you have cirrhosis of the liver?   No    Are you currently on dialysis?   No    Do you need assistance transferring?   No    BMI: Estimated body mass index is 33.55 kg/m  as calculated from the following:    Height as of 8/13/24: 1.683 m (5' 6.26\").    Weight as of " 8/13/24: 95 kg (209 lb 8 oz).     Is patients BMI > 40 and scheduling location [facility]?  No    Do you take an injectable medication for weight loss or diabetes (excluding insulin)?  No    Do you take the medication Naltrexone?  No    Do you take blood thinners?  No       Prep   Are you currently on dialysis or do you have chronic kidney disease?  No    Do you have a diagnosis of diabetes?  No    Do you have a diagnosis of cystic fibrosis (CF)?  No    On a regular basis do you go 3 -5 days between bowel movements?  No    BMI > 40?  No    Preferred Pharmacy:    [id number] - [place], [place] - [address]  [address] [address]  Phone: [phone] Fax: [phone]      Final Scheduling Details     Procedure scheduled  Colonoscopy    Surgeon:  Rogerio     Date of procedure:  11/20     Pre-OP / PAC:   No - Not required for this site.    Location  [facility] - Per exclusion criteria.    Sedation   Moderate Sedation - Per order.      Patient Reminders:   You will receive a call from a Nurse to review instructions and health history.  This assessment must be completed prior to your procedure.  Failure to complete the Nurse assessment may result in the procedure being cancelled.      On the day of your procedure, please designate an adult(s) who can drive you home stay with you for the next 24 hours. The medicines used in the exam will make you sleepy. You will not be able to drive.      You cannot take public transportation, ride share services, or non-medical taxi service without a responsible caregiver.  Medical transport services are allowed with the requirement that a responsible caregiver will receive you at your destination.  We require that drivers and caregivers are confirmed prior to your procedure.

## 2024-10-08 ENCOUNTER — OFFICE VISIT (OUTPATIENT)
Dept: INTERNAL MEDICINE | Facility: CLINIC | Age: 79
End: 2024-10-08
Payer: COMMERCIAL

## 2024-10-08 ENCOUNTER — TELEPHONE (OUTPATIENT)
Dept: INTERNAL MEDICINE | Facility: CLINIC | Age: 79
End: 2024-10-08

## 2024-10-08 ENCOUNTER — MYC MEDICAL ADVICE (OUTPATIENT)
Dept: INTERNAL MEDICINE | Facility: CLINIC | Age: 79
End: 2024-10-08
Payer: COMMERCIAL

## 2024-10-08 VITALS — OXYGEN SATURATION: 94 % | DIASTOLIC BLOOD PRESSURE: 77 MMHG | SYSTOLIC BLOOD PRESSURE: 134 MMHG | HEART RATE: 84 BPM

## 2024-10-08 DIAGNOSIS — G89.29 CHRONIC NECK PAIN: ICD-10-CM

## 2024-10-08 DIAGNOSIS — Z91.81 PERSONAL HISTORY OF FALL: Primary | ICD-10-CM

## 2024-10-08 DIAGNOSIS — M25.552 HIP PAIN, LEFT: ICD-10-CM

## 2024-10-08 DIAGNOSIS — M25.551 HIP PAIN, RIGHT: ICD-10-CM

## 2024-10-08 DIAGNOSIS — M62.838 MUSCLE SPASM: ICD-10-CM

## 2024-10-08 DIAGNOSIS — M54.2 CHRONIC NECK PAIN: ICD-10-CM

## 2024-10-08 PROCEDURE — 99214 OFFICE O/P EST MOD 30 MIN: CPT

## 2024-10-08 RX ORDER — CYCLOBENZAPRINE HCL 5 MG
5 TABLET ORAL 3 TIMES DAILY PRN
Qty: 30 TABLET | Refills: 0 | Status: SHIPPED | OUTPATIENT
Start: 2024-10-08

## 2024-10-08 NOTE — PROGRESS NOTES
Assessment & Plan     Personal history of fall  Hip pain, right  Hip pain, left  Chronic neck pain  Pain in bilateral hips, neck since her fall. No acute limitations in ROM. No neurologic deficits. Discussed likely muscular injury with fall. She does have a history of a cervical spine surgery, chronic limited ROM of cervical spine. Discussed imaging due to chronic neck symptoms, recent pain. Also discussed we could obtain imaging of her pelvis as most of her pain is located in this region and impact was on her pelvis with her recent fall. She will consider completing these if pain does not improve.   - XR Cervical Spine 2/3 Views  - XR Pelvis and Hip Bilateral 2 Views  - XR Sacrum and Coccyx 2 Views    Muscle spasm  She notes difficulty sleeping due to pain, tightness. Discussed flexeril use for muscle spasm. Reviewed risks of drowsiness, fall. Recommend using at bedtime as needed for spasms.   - cyclobenzaprine (FLEXERIL) 5 MG tablet  Dispense: 30 tablet; Refill: 0      Return if symptoms worsen or fail to improve.    Subjective   Toshia is a 79 year old, presenting for the following health issues:  Fall (Fall last friday.Upper body pain.)        10/8/2024     5:55 PM   Additional Questions   Roomed by KTCHACE   Accompanied by Ihsan(spouse)     History of Present Illness       Back Pain:  She presents for follow up of back pain. Patient's back pain is a new problem.    Original cause of back pain: a fall  First noticed back pain: in the last week  Patient feels back pain: constantlyLocation of back pain:  Right side of neck, left side of neck, right shoulder, left shoulder, right side of waist and left side of waist  Description of back pain: dull ache and sharp  Back pain spreads: nowhere    Since patient first noticed back pain, pain is: always present, but gets better and worse  Does back pain interfere with her job:  Not applicable  On a scale of 1-10 (10 being the worst), patient describes pain as:  4  What  "makes back pain worse: lying down and twisting   Acupuncture: not tried  Acetaminophen: helpful  Activity or exercise: not helpful  Chiropractor:  Not tried  Cold: helpful  Heat: helpful  Massage: not tried  Muscle relaxants: not tried  NSAIDS: not tried  Opioids: not tried  Physical Therapy: not tried  Rest: helpful  Steroid Injection: not tried  Stretching: not helpful  Surgery: not tried  TENS unit: not tried  Topical pain relievers: not tried  Other healthcare providers patient is seeing for back pain: None   She is taking medications regularly.     Toshia Caruso presents to the clinic today for evaluation after a fall. She has a history of hypertension, hyperlipidemia, TIA, prediabetes, HANNA, vitamin D deficiency, and depression.     She reports she was pulling a tree 4 days ago, was standing on a retaining wall and fell backwards about 2-3 feet onto a sidewalk. Fell on her back, then hit her head. Did not lose consciousness. Fall was about mid afternoon. Took tylenol afterwards, about every four hours, ice, heat. Tylenol and heat help. Tailbone pain developed the next day, along with neck pain. Then shoulder pain started yesterday, both sides. She sleeps well overall, though notes she will toss and turn, feels discomfort with this. Otherwise sleeps well per her report, wakes frequently. Feels like her waistline is what hurts the most. She denies numbness, tingling, or weakness. No changes to bowel or bladder.     Chronic left shoulder pain, feels like a \"bear tore muscles\", has been ongoing for 3-4 months. Limited motion of bilateral shoulders, left worse than right. She notes years ago a fall onto her left shoulder, though pain at that time seemed to go away. She would like to wait for physical therapy at this time. She can update the clinic if she would like a referral. Can also update if she would like an xray.     She reports a history of cervical spine surgery in the early 2000s, notes spacers were placed. " Neck has been tense since the fall.       Review of Systems  Constitutional, HEENT, cardiovascular, pulmonary, gi and gu systems are negative, except as otherwise noted.      Objective    /77 (BP Location: Right arm, Patient Position: Sitting, Cuff Size: Adult Large)   Pulse 84   SpO2 94%   There is no height or weight on file to calculate BMI.  Physical Exam   GENERAL: alert and no distress  NECK: no adenopathy, no asymmetry, masses, or scars  RESP: lungs clear to auscultation - no rales, rhonchi or wheezes  CV: regular rate and rhythm, normal S1 S2, no S3 or S4, no murmur, click or rub, no peripheral edema  MS: bilateral shoulders with limited ROM, neck limited with right rotation (chronic per patient). BUE sensation grossly intact, strength 5/5 bilaterally though subtle weakness in LUE compared to RUE. Lumbar flexion and extension intact. Tenderness with cervical palpation, tenderness with palpation of trapezius bilaterally, scapula appear equal without winging, no deltoid tenderness, no AC tenderness. Tenderness with light palpation of bilateral iliac crest and trochanteric region. Posterior neck scar.   NEURO: Normal strength and tone, mentation intact and speech normal  PSYCH: mentation appears normal, affect normal/bright            Signed Electronically by: Gricelda Clark NP

## 2024-10-08 NOTE — TELEPHONE ENCOUNTER
[facility] Call Center    Phone Message    May a detailed message be left on voicemail: yes     Reason for Call: Other: Per pt would like to know if she can be seen any sooner than her current appt on 10/15/24? Per pt is leaving on a trip on 10/18/24 and want to get things squared away asap before she leaves for a trip. Please call pt back. Thank you.      Action Taken: Message routed to:  [facility]    Travel Screening: Not Applicable     Date of Service:

## 2024-10-08 NOTE — TELEPHONE ENCOUNTER
Patient confirmed scheduled appointment:  Date: Oct 8th   Time: 6pm  Visit type: [facility] Return   Provider: Gricelda Clark   [place] [facility] [place]  Additional notes: Had a same day opening, pt agreed to sooner appt

## 2024-10-29 ASSESSMENT — ANXIETY QUESTIONNAIRES: GAD7 TOTAL SCORE: 0

## 2024-10-31 ENCOUNTER — MYC MEDICAL ADVICE (OUTPATIENT)
Dept: DERMATOLOGY | Facility: CLINIC | Age: 79
End: 2024-10-31
Payer: COMMERCIAL

## 2024-11-05 ENCOUNTER — TELEPHONE (OUTPATIENT)
Dept: GASTROENTEROLOGY | Facility: CLINIC | Age: 79
End: 2024-11-05
Payer: COMMERCIAL

## 2024-11-05 NOTE — TELEPHONE ENCOUNTER
Attempted to contact patient in order to complete pre assessment questions.     No answer. Left message to return call to [phone] option 2.    Callback required communication sent via iContact.    Maria T Mathis RN  Endoscopy Procedure Pre Assessment

## 2024-11-05 NOTE — TELEPHONE ENCOUNTER
Pre assessment completed for upcoming procedure.   (Please see previous telephone encounter notes for complete details)    Patient  returned call.       Procedure details:    Arrival time and facility location reviewed.    Pre op exam needed? No.    Designated  policy reviewed. Instructed to have someone stay 6  hours post procedure.       Medication review:    Medications reviewed. Please see supporting documentation below. Holding recommendations discussed (if applicable).   N/A      Prep for procedure:     Procedure prep instructions reviewed.        Any additional information needed:  N/A      Patient  verbalized understanding and had no questions or concerns at this time.      Stacey Greenwood RN  Endoscopy Procedure Pre Assessment   [phone] option 2

## 2024-11-05 NOTE — TELEPHONE ENCOUNTER
Pre visit planning completed.      Procedure details:    Patient scheduled for Colonoscopy on 11/20/2024.     Arrival time: 1345. Procedure time 1445    Facility location: [facility]; [address] [place] [address], [address] Floor, [place] [address]. Check in location: Main entrance at registration desk.    Sedation type: Conscious sedation     Pre op exam needed? No.    Indication for procedure: Colon cancer screening [Z12.11]       Chart review:     Electronic implanted devices? No    Recent diagnosis of diverticulitis within the last 6 weeks? No      Medication review:    Diabetic? Prediabetic    Anticoagulants? No    Weight loss medication/injectable? No GLP-1 medication per patient's medication list.  RN will verify with pre-assessment call.    Other medication HOLDING recommendations:  N/A      Prep for procedure:     Bowel prep recommendation: Double Miralax . Pt unable to tolerate Golytely  Due to: constipation noted or reported.     Prep instructions sent via [email or web address] Mathis RN  Endoscopy Procedure Pre Assessment RN  [phone] option 2

## 2024-11-20 ENCOUNTER — HOSPITAL ENCOUNTER (OUTPATIENT)
Facility: CLINIC | Age: 79
Discharge: HOME | End: 2024-11-20
Attending: INTERNAL MEDICINE | Admitting: INTERNAL MEDICINE
Payer: COMMERCIAL

## 2024-11-20 VITALS
SYSTOLIC BLOOD PRESSURE: 152 MMHG | OXYGEN SATURATION: 92 % | HEART RATE: 72 BPM | RESPIRATION RATE: 16 BRPM | DIASTOLIC BLOOD PRESSURE: 79 MMHG

## 2024-11-20 LAB — COLONOSCOPY: NORMAL

## 2024-11-20 PROCEDURE — 45385 COLONOSCOPY W/LESION REMOVAL: CPT | Mod: PT | Performed by: INTERNAL MEDICINE

## 2024-11-20 PROCEDURE — 99153 MOD SED SAME PHYS/QHP EA: CPT | Performed by: INTERNAL MEDICINE

## 2024-11-20 PROCEDURE — 250N000011 HC RX IP 250 OP 636: Performed by: INTERNAL MEDICINE

## 2024-11-20 PROCEDURE — 88305 TISSUE EXAM BY PATHOLOGIST: CPT | Mod: TC | Performed by: INTERNAL MEDICINE

## 2024-11-20 PROCEDURE — 88305 TISSUE EXAM BY PATHOLOGIST: CPT | Mod: 26 | Performed by: STUDENT IN AN ORGANIZED HEALTH CARE EDUCATION/TRAINING PROGRAM

## 2024-11-20 PROCEDURE — 45380 COLONOSCOPY AND BIOPSY: CPT | Performed by: INTERNAL MEDICINE

## 2024-11-20 PROCEDURE — G0500 MOD SEDAT ENDO SERVICE >5YRS: HCPCS | Performed by: INTERNAL MEDICINE

## 2024-11-20 RX ORDER — FENTANYL CITRATE 50 UG/ML
INJECTION, SOLUTION INTRAMUSCULAR; INTRAVENOUS PRN
Status: DISCONTINUED | OUTPATIENT
Start: 2024-11-20 | End: 2024-11-20 | Stop reason: HOSPADM

## 2024-11-20 RX ORDER — NALOXONE HYDROCHLORIDE 0.4 MG/ML
0.4 INJECTION, SOLUTION INTRAMUSCULAR; INTRAVENOUS; SUBCUTANEOUS
Status: CANCELLED | OUTPATIENT
Start: 2024-11-20

## 2024-11-20 RX ORDER — ONDANSETRON 4 MG/1
4 TABLET, ORALLY DISINTEGRATING ORAL EVERY 6 HOURS PRN
Status: CANCELLED | OUTPATIENT
Start: 2024-11-20

## 2024-11-20 RX ORDER — NALOXONE HYDROCHLORIDE 0.4 MG/ML
0.2 INJECTION, SOLUTION INTRAMUSCULAR; INTRAVENOUS; SUBCUTANEOUS
Status: CANCELLED | OUTPATIENT
Start: 2024-11-20

## 2024-11-20 RX ORDER — ONDANSETRON 2 MG/ML
4 INJECTION INTRAMUSCULAR; INTRAVENOUS EVERY 6 HOURS PRN
Status: CANCELLED | OUTPATIENT
Start: 2024-11-20

## 2024-11-20 RX ORDER — PROCHLORPERAZINE MALEATE 5 MG/1
5 TABLET ORAL EVERY 6 HOURS PRN
Status: CANCELLED | OUTPATIENT
Start: 2024-11-20

## 2024-11-20 RX ORDER — FLUMAZENIL 0.1 MG/ML
0.2 INJECTION, SOLUTION INTRAVENOUS
Status: CANCELLED | OUTPATIENT
Start: 2024-11-20 | End: 2024-11-20

## 2024-11-20 ASSESSMENT — ACTIVITIES OF DAILY LIVING (ADL)
ADLS_ACUITY_SCORE: 0
ADLS_ACUITY_SCORE: 0

## 2024-11-20 NOTE — H&P
Gastroenterology Pre-op History and Physical    Toshia Caruso MRN# [id number]   Age: 79 year old YOB: 1945      Date of Surgery: 11/20/24  [facility]      Date of Exam 11/20/2024 Facility Same Day       Primary care provider: Kimo Musa         Chief Complaint and/or Reason for Procedure:   80 yo female for colon cancer screening. FH of father with colon cancer. Last exam 2019 with single small TA.  No symptoms.  No anticoagulation.         Past Medical and Surgical History:     Past Medical History:   Diagnosis Date    Contact dermatitis of eyelid, unspecified laterality 12/13/2015    Depression     Infection due to 2019 novel coronavirus 05/11/2023    Mixed hyperlipidemia     Obstructive sleep apnea     has CPAP    Small bowel obstruction (H) 06/05/2022    TIA (transient ischemic attack) 2022    left sided symtpoms for 4 hours    Transaminitis 02/13/2023     Past Surgical History:   Procedure Laterality Date    COLONOSCOPY N/A 4/28/2016    Procedure: COLONOSCOPY;  Surgeon: Victoria Mathis MD;  Location:  GI    COLONOSCOPY N/A 10/29/2019    Procedure: COLONOSCOPY, WITH POLYPECTOMY;  Surgeon: Perla Hay MD;  Location: [facility] OR    LAMINECTOMY CERIVCAL POSTERIOR THREE+ LEVELS  2002    C3-7            Medications (include herbals and vitamins):        Medications Prior to Admission   Medication Sig Dispense Refill Last Dose/Taking    amLODIPine (NORVASC) 2.5 MG tablet Take 1 tablet (2.5 mg) by mouth daily 90 tablet 3 11/19/2024    B Complex-C (SUPER B COMPLEX/VITAMIN C PO) Take 1 capsule by mouth daily as needed   More than a month    bisacodyl (DULCOLAX) 5 MG EC tablet Two days prior to exam take two (2) tablets at 4pm. One day prior to exam take two (2) tablets at 4pm 4 tablet 0 11/20/2024    citalopram (CELEXA) 20 MG tablet Take 1 tablet (20 mg) by mouth daily 90 tablet 3 11/19/2024    cyclobenzaprine (FLEXERIL) 5 MG  tablet Take 1 tablet (5 mg) by mouth 3 times daily as needed for muscle spasms. 30 tablet 0 More than a month    diphenhydrAMINE-APAP, sleep, (TYLENOL PM EXTRA STRENGTH PO) Take 1 tablet by mouth nightly as needed   Past Week    fluticasone (FLONASE) 50 MCG/ACT nasal spray Spray 1 spray into both nostrils daily (Patient taking differently: Spray 1 spray into both nostrils daily as needed for rhinitis or allergies.) 48 g 1 More than a month    folic acid (FOLVITE) 400 MCG tablet Take 400 mcg by mouth every other day   11/19/2024    Glucosamine-Chondroit-Vit C-Mn (GLUCOSAMINE CHONDROITIN COMPLX) TABS Take 1 tablet by mouth every other day   11/19/2024    loratadine (CLARITIN) 10 MG tablet Take 10 mg by mouth as needed for allergies   More than a month    Multiple Vitamin (MULTIVITAMINS) CAPS Take 1 tablet by mouth daily Centrum Silver   Past Week    Nutritional Supplements (ESTROVEN) TABS Take 1 tablet by mouth daily.   11/19/2024    omega 3 1000 MG CAPS Take 1 capsule by mouth every other day   11/19/2024    polyethylene glycol (GOLYTELY) 236 g suspension Two days before procedure at 5PM fill first container with water. Mix and drink an 8 oz glass every 15 minutes until HALF of the container is gone. Place the remainder in the refrigerator. One day before procedure at 5PM drink second half of bowel prep. Drink an 8 oz glass every 15 minutes until it is gone. Day of procedure 6 hours before arrival time fill the 2nd container with water. Mix and drink an 8 oz glass every 15 minutes until HALF of the container is gone. Discard the remaining solution. 8000 mL 0 11/20/2024    rosuvastatin (CRESTOR) 10 MG tablet Take 1 tablet (10 mg) by mouth daily 90 tablet 3 11/19/2024             Allergies:      Allergies   Allergen Reactions    Smoke. Itching and Anaphylaxis     Watery eyes    Cat Hair Extract Hives    Cats Itching    Seasonal Allergies      Eyes, throat sinus               Physical Exam:   All vitals have been  reviewed  Patient Vitals for the past 8 hrs:   BP Pulse Resp SpO2   11/20/24 1048 (!) 160/95 93 11 95 %     No intake/output data recorded.  Airway assessment:   Patient is able to open mouth wide  Patient is able to stick out tongue  Mallampatti classification: Class I (visualization of the soft palate, fauces, uvula, anterior and posterior pillars)}      Lungs:   No increased work of breathing, good air exchange, clear to auscultation bilaterally, no crackles or wheezing     Cardiovascular:   regular rate and rhythm and normal S1 and S2                 Anesthetic risk and/or ASA classification:     ASA 2 (HANNA).    Eduardo Beatty MD

## 2024-11-21 LAB
PATH REPORT.COMMENTS IMP SPEC: NORMAL
PATH REPORT.COMMENTS IMP SPEC: NORMAL
PATH REPORT.FINAL DX SPEC: NORMAL
PATH REPORT.GROSS SPEC: NORMAL
PATH REPORT.MICROSCOPIC SPEC OTHER STN: NORMAL
PATH REPORT.RELEVANT HX SPEC: NORMAL
PHOTO IMAGE: NORMAL

## 2024-12-05 PROBLEM — D12.6 ADENOMATOUS POLYP OF COLON: Status: ACTIVE | Noted: 2024-12-05

## 2024-12-17 ENCOUNTER — PATIENT OUTREACH (OUTPATIENT)
Dept: CARE COORDINATION | Facility: CLINIC | Age: 79
End: 2024-12-17
Payer: COMMERCIAL

## 2024-12-22 ENCOUNTER — HEALTH MAINTENANCE LETTER (OUTPATIENT)
Age: 79
End: 2024-12-22

## 2024-12-31 ENCOUNTER — OFFICE VISIT (OUTPATIENT)
Dept: DERMATOLOGY | Facility: CLINIC | Age: 79
End: 2024-12-31
Payer: COMMERCIAL

## 2024-12-31 DIAGNOSIS — L29.9 SCALP PRURITUS: ICD-10-CM

## 2024-12-31 DIAGNOSIS — L82.1 SEBORRHEIC KERATOSES: ICD-10-CM

## 2024-12-31 DIAGNOSIS — D18.01 CHERRY ANGIOMA: ICD-10-CM

## 2024-12-31 DIAGNOSIS — L81.4 LENTIGINES: ICD-10-CM

## 2024-12-31 DIAGNOSIS — Z12.83 ENCOUNTER FOR SCREENING FOR MALIGNANT NEOPLASM OF SKIN: Primary | ICD-10-CM

## 2024-12-31 DIAGNOSIS — D22.9 MULTIPLE NEVI: ICD-10-CM

## 2024-12-31 DIAGNOSIS — L82.0 INFLAMED SEBORRHEIC KERATOSIS: ICD-10-CM

## 2024-12-31 RX ORDER — FLUOCINONIDE TOPICAL SOLUTION USP, 0.05% 0.5 MG/ML
SOLUTION TOPICAL 2 TIMES DAILY PRN
Qty: 60 ML | Refills: 4 | Status: SHIPPED | OUTPATIENT
Start: 2024-12-31

## 2024-12-31 ASSESSMENT — PAIN SCALES - GENERAL: PAINLEVEL_OUTOF10: NO PAIN (0)

## 2024-12-31 NOTE — LETTER
12/31/2024       RE: Toshia Caruso  [address] [facility] [address]     Dear Colleague,    Thank you for referring your patient, Toshia Caruso, to the [facility] DERMATOLOGY [facility] [place] [facility]. Please see a copy of my visit note below.    [facility] Dermatology Note  Encounter Date: Dec 31, 2024  Office Visit     Reviewed patients past medical history and pertinent chart review prior to patients visit today.     Dermatology Problem List:  # FBSC 12/31/2024   # Scalp pruritus   - fluocinonide 0.05% solution    No personal history of skin cancer.  No family history of skin cancer.  ____________________________________________    Assessment & Plan:     # Scalp pruritus  - Start fluocinonide 0.05% solution twice daily for 2-4 weeks. Restart if itching flares again in the future. We reviewed potential side effects, including skin atrophy.  - Start a non-scented shampoo, samples of Vanicream shampoo provided.     # Inflamed seborrheic keratoses x3  The benign nature of the skin lesion(s) was discussed with the patient.  Due to the inflamed and irritated nature of the lesions I recommend cryotherapy and the patient was agreeable.      Cryotherapy procedure note, location(s): mid back x2, and right posterior shoulder x1  After verbal consent and discussion of risks and benefits including, but not limited to, dyspigmentation/scar, blister, and pain, the lesion(s) was(were) treated with 1-2 mm freeze border for 1-2 cycles with liquid nitrogen. Post cryotherapy instructions were provided.    # Multiple nevi, trunk and extremities  # Solar lentigines  - No concerning features on dermoscopy. We discussed the importance of self exams at home. ABCDE criteria and importance of SPF 30+ sunscreen and photoprotective clothing reviewed.     # Cherry angiomas  # Seborrheic keratoses  - We discussed the benign nature of the skin lesions. No  treatment required. Continued observation recommended. Follow up with any concerns.      Follow-up:  Annual for follow up full body skin exam, as needed for new or changing lesions or new concerns    All risks, benefits and alternatives were discussed with patient.  Patient is in agreement and understands the assessment and plan.  All questions were answered.  Neva Sharma PA-C  [facility] Dermatology    ____________________________________________    CC: Derm Problem (FBSE - concerning spot on R back that itches, has been around for a couple months)    HPI:  Ms. Toshia Caruso is a(n) 79 year old female who presents today as a new patient for a full body skin cancer screening. The patient requests evaluation of a lesion on the right back. The lesion has been present for a few months. It is quite itchy. Second, she notes a long history of scalp itching. This has not improved with different OTC shampoos. No other specific cutaneous concerns today. The patient reports trying to be diligent with photoprotection.      Physical Exam:  Vitals: There were no vitals taken for this visit.  SKIN: Total skin excluding the genitalia areas was performed. The exam included the scalp, face, neck, bilateral arms, chest, back, abdomen, bilateral legs, digits, mons pubis, buttocks, and nails.   - Miramontes II.  - The mid back x2, and right posterior shoulder x1 demonstrates waxy papule(s) with an erythematous base, consistent with inflamed seborrheic keratoses.   - No scale or erythema involving the scalp, no hair loss.   - Multiple tan/brown macules and papules scattered throughout exam, consistent with benign nevi. No concerning features on dermoscopy.   - Scattered tan, homogenous macules scattered on sun exposed skin, consistent with solar lentigines.   - Scattered waxy, stuck on appearing papules and patches, consistent with seborrheic keratoses.    - Several 1-2 mm red dome shaped symmetric papules, consistent with  cherry angiomas.     Medications:  Current Outpatient Medications   Medication Sig Dispense Refill     amLODIPine (NORVASC) 2.5 MG tablet Take 1 tablet (2.5 mg) by mouth daily 90 tablet 3     B Complex-C (SUPER B COMPLEX/VITAMIN C PO) Take 1 capsule by mouth daily as needed       bisacodyl (DULCOLAX) 5 MG EC tablet Two days prior to exam take two (2) tablets at 4pm. One day prior to exam take two (2) tablets at 4pm 4 tablet 0     citalopram (CELEXA) 20 MG tablet Take 1 tablet (20 mg) by mouth daily 90 tablet 3     cyclobenzaprine (FLEXERIL) 5 MG tablet Take 1 tablet (5 mg) by mouth 3 times daily as needed for muscle spasms. 30 tablet 0     diphenhydrAMINE-APAP, sleep, (TYLENOL PM EXTRA STRENGTH PO) Take 1 tablet by mouth nightly as needed       fluticasone (FLONASE) 50 MCG/ACT nasal spray Spray 1 spray into both nostrils daily (Patient taking differently: Spray 1 spray into both nostrils daily as needed for rhinitis or allergies.) 48 g 1     folic acid (FOLVITE) 400 MCG tablet Take 400 mcg by mouth every other day       Glucosamine-Chondroit-Vit C-Mn (GLUCOSAMINE CHONDROITIN COMPLX) TABS Take 1 tablet by mouth every other day       loratadine (CLARITIN) 10 MG tablet Take 10 mg by mouth as needed for allergies       Multiple Vitamin (MULTIVITAMINS) CAPS Take 1 tablet by mouth daily Centrum Silver       Nutritional Supplements (ESTROVEN) TABS Take 1 tablet by mouth daily.       omega 3 1000 MG CAPS Take 1 capsule by mouth every other day       polyethylene glycol (GOLYTELY) 236 g suspension Two days before procedure at 5PM fill first container with water. Mix and drink an 8 oz glass every 15 minutes until HALF of the container is gone. Place the remainder in the refrigerator. One day before procedure at 5PM drink second half of bowel prep. Drink an 8 oz glass every 15 minutes until it is gone. Day of procedure 6 hours before arrival time fill the 2nd container with water. Mix and drink an 8 oz glass every 15 minutes  until HALF of the container is gone. Discard the remaining solution. 8000 mL 0     rosuvastatin (CRESTOR) 10 MG tablet Take 1 tablet (10 mg) by mouth daily 90 tablet 3     No current facility-administered medications for this visit.      Past Medical History:   Patient Active Problem List   Diagnosis     Obstructive sleep apnea - moderate (AHI 17)     Constipation     Chronic pain of both knees     Class 1 obesity due to excess calories with serious comorbidity and body mass index (BMI) of 33.0 to 33.9 in adult     Hyperlipidemia     History of TIA (transient ischemic attack)     Vitamin D deficiency     Mild episode of recurrent major depressive disorder (H)     Primary hypertension     Itch of skin     Colon cancer screening     Prediabetes     Adenomatous polyp of colon     Past Medical History:   Diagnosis Date     Contact dermatitis of eyelid, unspecified laterality 12/13/2015     Depression      Infection due to 2019 novel coronavirus 05/11/2023     Mixed hyperlipidemia      Obstructive sleep apnea     has CPAP     Small bowel obstruction (H) 06/05/2022     TIA (transient ischemic attack) 2022    left sided symtpoms for 4 hours     Transaminitis 02/13/2023       CC Referred Self, MD  No address on file on close of this encounter.      Again, thank you for allowing me to participate in the care of your patient.      Sincerely,    Neva Sharma PA-C

## 2024-12-31 NOTE — PROGRESS NOTES
[facility] Dermatology Note  Encounter Date: Dec 31, 2024  Office Visit     Reviewed patients past medical history and pertinent chart review prior to patients visit today.     Dermatology Problem List:  # FBSC 12/31/2024   # Scalp pruritus   - fluocinonide 0.05% solution    No personal history of skin cancer.  No family history of skin cancer.  ____________________________________________    Assessment & Plan:     # Scalp pruritus  - Start fluocinonide 0.05% solution twice daily for 2-4 weeks. Restart if itching flares again in the future. We reviewed potential side effects, including skin atrophy.  - Start a non-scented shampoo, samples of Vanicream shampoo provided.     # Inflamed seborrheic keratoses x3  The benign nature of the skin lesion(s) was discussed with the patient.  Due to the inflamed and irritated nature of the lesions I recommend cryotherapy and the patient was agreeable.      Cryotherapy procedure note, location(s): mid back x2, and right posterior shoulder x1  After verbal consent and discussion of risks and benefits including, but not limited to, dyspigmentation/scar, blister, and pain, the lesion(s) was(were) treated with 1-2 mm freeze border for 1-2 cycles with liquid nitrogen. Post cryotherapy instructions were provided.    # Multiple nevi, trunk and extremities  # Solar lentigines  - No concerning features on dermoscopy. We discussed the importance of self exams at home. ABCDE criteria and importance of SPF 30+ sunscreen and photoprotective clothing reviewed.     # Cherry angiomas  # Seborrheic keratoses  - We discussed the benign nature of the skin lesions. No treatment required. Continued observation recommended. Follow up with any concerns.      Follow-up:  Annual for follow up full body skin exam, as needed for new or changing lesions or new concerns    All risks, benefits and alternatives were discussed with patient.  Patient is in agreement and understands the  assessment and plan.  All questions were answered.  Neva Sharma PA-C  [facility] Dermat[facility]    ____________________________________________    CC: Derm Problem (FBSE - concerning spot on R back that itches, has been around for a couple months)    HPI:  Ms. Toshia Caruso is a(n) 79 year old female who presents today as a new patient for a full body skin cancer screening. The patient requests evaluation of a lesion on the right back. The lesion has been present for a few months. It is quite itchy. Second, she notes a long history of scalp itching. This has not improved with different OTC shampoos. No other specific cutaneous concerns today. The patient reports trying to be diligent with photoprotection.      Physical Exam:  Vitals: There were no vitals taken for this visit.  SKIN: Total skin excluding the genitalia areas was performed. The exam included the scalp, face, neck, bilateral arms, chest, back, abdomen, bilateral legs, digits, mons pubis, buttocks, and nails.   - Miramontes II.  - The mid back x2, and right posterior shoulder x1 demonstrates waxy papule(s) with an erythematous base, consistent with inflamed seborrheic keratoses.   - No scale or erythema involving the scalp, no hair loss.   - Multiple tan/brown macules and papules scattered throughout exam, consistent with benign nevi. No concerning features on dermoscopy.   - Scattered tan, homogenous macules scattered on sun exposed skin, consistent with solar lentigines.   - Scattered waxy, stuck on appearing papules and patches, consistent with seborrheic keratoses.    - Several 1-2 mm red dome shaped symmetric papules, consistent with cherry angiomas.     Medications:  Current Outpatient Medications   Medication Sig Dispense Refill    amLODIPine (NORVASC) 2.5 MG tablet Take 1 tablet (2.5 mg) by mouth daily 90 tablet 3    B Complex-C (SUPER B COMPLEX/VITAMIN C PO) Take 1 capsule by mouth daily as needed      bisacodyl (DULCOLAX) 5 MG EC tablet  Two days prior to exam take two (2) tablets at 4pm. One day prior to exam take two (2) tablets at 4pm 4 tablet 0    citalopram (CELEXA) 20 MG tablet Take 1 tablet (20 mg) by mouth daily 90 tablet 3    cyclobenzaprine (FLEXERIL) 5 MG tablet Take 1 tablet (5 mg) by mouth 3 times daily as needed for muscle spasms. 30 tablet 0    diphenhydrAMINE-APAP, sleep, (TYLENOL PM EXTRA STRENGTH PO) Take 1 tablet by mouth nightly as needed      fluticasone (FLONASE) 50 MCG/ACT nasal spray Spray 1 spray into both nostrils daily (Patient taking differently: Spray 1 spray into both nostrils daily as needed for rhinitis or allergies.) 48 g 1    folic acid (FOLVITE) 400 MCG tablet Take 400 mcg by mouth every other day      Glucosamine-Chondroit-Vit C-Mn (GLUCOSAMINE CHONDROITIN COMPLX) TABS Take 1 tablet by mouth every other day      loratadine (CLARITIN) 10 MG tablet Take 10 mg by mouth as needed for allergies      Multiple Vitamin (MULTIVITAMINS) CAPS Take 1 tablet by mouth daily Centrum Silver      Nutritional Supplements (ESTROVEN) TABS Take 1 tablet by mouth daily.      omega 3 1000 MG CAPS Take 1 capsule by mouth every other day      polyethylene glycol (GOLYTELY) 236 g suspension Two days before procedure at 5PM fill first container with water. Mix and drink an 8 oz glass every 15 minutes until HALF of the container is gone. Place the remainder in the refrigerator. One day before procedure at 5PM drink second half of bowel prep. Drink an 8 oz glass every 15 minutes until it is gone. Day of procedure 6 hours before arrival time fill the 2nd container with water. Mix and drink an 8 oz glass every 15 minutes until HALF of the container is gone. Discard the remaining solution. 8000 mL 0    rosuvastatin (CRESTOR) 10 MG tablet Take 1 tablet (10 mg) by mouth daily 90 tablet 3     No current facility-administered medications for this visit.      Past Medical History:   Patient Active Problem List   Diagnosis    Obstructive sleep apnea -  moderate (AHI 17)    Constipation    Chronic pain of both knees    Class 1 obesity due to excess calories with serious comorbidity and body mass index (BMI) of 33.0 to 33.9 in adult    Hyperlipidemia    History of TIA (transient ischemic attack)    Vitamin D deficiency    Mild episode of recurrent major depressive disorder (H)    Primary hypertension    Itch of skin    Colon cancer screening    Prediabetes    Adenomatous polyp of colon     Past Medical History:   Diagnosis Date    Contact dermatitis of eyelid, unspecified laterality 12/13/2015    Depression     Infection due to 2019 novel coronavirus 05/11/2023    Mixed hyperlipidemia     Obstructive sleep apnea     has CPAP    Small bowel obstruction (H) 06/05/2022    TIA (transient ischemic attack) 2022    left sided symtpoms for 4 hours    Transaminitis 02/13/2023       CC Referred Self, MD  No address on file on close of this encounter.

## 2024-12-31 NOTE — NURSING NOTE
Dermatology Rooming Note    Toshia Caruso's goals for this visit include:   Chief Complaint   Patient presents with    Derm Problem     FBSE - concerning spot on R back that itches, has been around for a couple months     Meera Potter EMT

## 2024-12-31 NOTE — PATIENT INSTRUCTIONS
Patient Education        Proper skin care from [facility]:     -Eliminate harsh soaps as they strip the natural oils from the skin, often resulting in dry itchy skin ( i.e. Dial, Zest, Sami Spring)  -Use mild soaps such as Cetaphil or Dove Sensitive Skin in the shower. You do not need to use soap on arms, legs, and trunk every time you shower unless visibly soiled.   -Avoid hot or cold showers.  -After showering, lightly dry off and apply moisturizing within 2-3 minutes. This will help trap moisture in the skin.   -Aggressive use of a moisturizer at least 1-2 times a day to the entire body (including -Vanicream, Cetaphil, Aquaphor or Cerave) and moisturize hands after every washing.  -We recommend using moisturizers that come in a tub that needs to be scooped out, not a pump. This has more of an oil base. It will hold moisture in your skin much better than a water base moisturizer. The above recommended are non-pore clogging.        Wear a sunscreen with at least SPF 30 on your face, ears, neck and V of the chest daily. Wear sunscreen on other areas of the body if those areas are exposed to the sun throughout the day. Sunscreens can contain physical and/or chemical blockers. Physical blockers are less likely to clog pores, these include zinc oxide and titanium dioxide. Reapply every two hour and after swimming.      Sunscreen examples: https://www.ewg.org/sunscreen/     UV radiation  UVA radiation remains constant throughout the day and throughout the year. It is a longer wavelength than UVB and therefore penetrates deeper into the skin leading to immediate and delayed tanning, photoaging, and skin cancer. 70-80% of UVA and UVB radiation occurs between the hours of 10am-2pm.  UVB radiation  UVB radiation causes the most harmful effects and is more significant during the summer months. However, snow and ice can reflect UVB radiation leading to skin damage during the winter months as well. UVB radiation is  responsible for tanning, burning, inflammation, delayed erythema (pinkness), pigmentation (brown spots), and skin cancer.      I recommend self monthly full body exams and yearly full body exams with a dermatology provider. If you develop a new or changing lesion please follow up for examination. Most skin cancers are pink and scaly or pink and pearly. However, we do see blue/brown/black skin cancers.  Consider the ABCDEs of melanoma when giving yourself your monthly full body exam ( don't forget the groin, buttocks, feet, toes, etc). A-asymmetry, B-borders, C-color, D-diameter, E-elevation or evolving. If you see any of these changes please follow up in clinic. If you cannot see your back I recommend purchasing a hand held mirror to use with a larger wall mirror.       Checking for Skin Cancer  You can find cancer early by checking your skin each month. There are 3 kinds of skin cancer. They are melanoma, basal cell carcinoma, and squamous cell carcinoma. Doing monthly skin checks is the best way to find new marks or skin changes. Follow the instructions below for checking your skin.   The ABCDEs of checking moles for melanoma   Check your moles or growths for signs of melanoma using ABCDE:   Asymmetry: the sides of the mole or growth don t match  Border: the edges are ragged, notched, or blurred  Color: the color within the mole or growth varies  Diameter: the mole or growth is larger than 6 mm (size of a pencil eraser)  Evolving: the size, shape, or color of the mole or growth is changing (evolving is not shown in the images below)    Checking for other types of skin cancer  Basal cell carcinoma or squamous cell carcinoma have symptoms such as:      A spot or mole that looks different from all other marks on your skin  Changes in how an area feels, such as itching, tenderness, or pain  Changes in the skin's surface, such as oozing, bleeding, or scaliness  A sore that does not heal  New swelling or redness beyond  the border of a mole     Who s at risk?  Anyone can get skin cancer. But you are at greater risk if you have:   Fair skin, light-colored hair, or light-colored eyes  Many moles or abnormal moles on your skin  A history of sunburns from sunlight or tanning beds  A family history of skin cancer  A history of exposure to radiation or chemicals  A weakened immune system  If you have had skin cancer in the past, you are at risk for recurring skin cancer.   How to check your skin  Do your monthly skin checkups in front of a full-length mirror. Check all parts of your body, including your:   Head (ears, face, neck, and scalp)  Torso (front, back, and sides)  Arms (tops, undersides, upper, and lower armpits)  Hands (palms, backs, and fingers, including under the nails)  Buttocks and genitals  Legs (front, back, and sides)  Feet (tops, soles, toes, including under the nails, and between toes)  If you have a lot of moles, take digital photos of them each month. Make sure to take photos both up close and from a distance. These can help you see if any moles change over time.   Most skin changes are not cancer. But if you see any changes in your skin, call your doctor right away. Only he or she can diagnose a problem. If you have skin cancer, seeing your doctor can be the first step toward getting the treatment that could save your life.   [employer] last reviewed this educational content on 4/1/2019 2000-2020 [employer]. [address] [place] [address], [place] [address]. All rights reserved. This information is not intended as a substitute for professional medical care. Always follow your healthcare professional's instructions.        When should I call my doctor?  If you are worsening or not improving, please, contact us or seek urgent care as noted below.      Who should I call with questions (adults)?  [facility] (adult and pediatric): [phone]  [facility]  [place] (adult): [phone]  [facility] ([place], [place], [place] and [place]) [phone]  For urgent needs outside of business hours call the [facility] at [phone] and ask for the dermatology resident on call to be paged  If this is a medical emergency and you are unable to reach an ER, Call 911        If you need a prescription refill, please contact your pharmacy. Refills are approved or denied by our Physicians during normal business hours, Monday through Fridays  Per office policy, refills will not be granted if you have not been seen within the past year (or sooner depending on your child's condition)

## 2025-03-06 ENCOUNTER — APPOINTMENT (OUTPATIENT)
Dept: CT IMAGING | Facility: CLINIC | Age: 80
End: 2025-03-06
Attending: EMERGENCY MEDICINE
Payer: COMMERCIAL

## 2025-03-06 ENCOUNTER — HOSPITAL ENCOUNTER (EMERGENCY)
Facility: CLINIC | Age: 80
Discharge: HOME | End: 2025-03-06
Attending: EMERGENCY MEDICINE
Payer: COMMERCIAL

## 2025-03-06 ENCOUNTER — APPOINTMENT (OUTPATIENT)
Dept: GENERAL RADIOLOGY | Facility: CLINIC | Age: 80
End: 2025-03-06
Attending: EMERGENCY MEDICINE
Payer: COMMERCIAL

## 2025-03-06 VITALS
SYSTOLIC BLOOD PRESSURE: 171 MMHG | HEART RATE: 95 BPM | OXYGEN SATURATION: 97 % | WEIGHT: 206.2 LBS | RESPIRATION RATE: 18 BRPM | TEMPERATURE: 98 F | HEIGHT: 66 IN | BODY MASS INDEX: 33.14 KG/M2 | DIASTOLIC BLOOD PRESSURE: 92 MMHG

## 2025-03-06 DIAGNOSIS — S32.010A CLOSED COMPRESSION FRACTURE OF BODY OF L1 VERTEBRA (H): ICD-10-CM

## 2025-03-06 DIAGNOSIS — R07.9 CHEST PAIN, UNSPECIFIED TYPE: ICD-10-CM

## 2025-03-06 LAB
ANION GAP SERPL CALCULATED.3IONS-SCNC: 12 MMOL/L (ref 7–15)
ATRIAL RATE - MUSE: 78 BPM
BASOPHILS # BLD AUTO: 0 10E3/UL (ref 0–0.2)
BASOPHILS NFR BLD AUTO: 0 %
BUN SERPL-MCNC: 12.2 MG/DL (ref 8–23)
CALCIUM SERPL-MCNC: 9.9 MG/DL (ref 8.8–10.4)
CHLORIDE SERPL-SCNC: 103 MMOL/L (ref 98–107)
CREAT SERPL-MCNC: 0.79 MG/DL (ref 0.51–0.95)
D DIMER PPP FEU-MCNC: 0.59 UG/ML FEU (ref 0–0.5)
DIASTOLIC BLOOD PRESSURE - MUSE: NORMAL MMHG
EGFRCR SERPLBLD CKD-EPI 2021: 76 ML/MIN/1.73M2
EOSINOPHIL # BLD AUTO: 0.1 10E3/UL (ref 0–0.7)
EOSINOPHIL NFR BLD AUTO: 3 %
ERYTHROCYTE [DISTWIDTH] IN BLOOD BY AUTOMATED COUNT: 12.3 % (ref 10–15)
GLUCOSE SERPL-MCNC: 107 MG/DL (ref 70–99)
HCO3 SERPL-SCNC: 25 MMOL/L (ref 22–29)
HCT VFR BLD AUTO: 49 % (ref 35–47)
HGB BLD-MCNC: 16 G/DL (ref 11.7–15.7)
HOLD SPECIMEN: NORMAL
HOLD SPECIMEN: NORMAL
IMM GRANULOCYTES # BLD: 0 10E3/UL
IMM GRANULOCYTES NFR BLD: 1 %
INTERPRETATION ECG - MUSE: NORMAL
LYMPHOCYTES # BLD AUTO: 1.6 10E3/UL (ref 0.8–5.3)
LYMPHOCYTES NFR BLD AUTO: 33 %
MCH RBC QN AUTO: 31.9 PG (ref 26.5–33)
MCHC RBC AUTO-ENTMCNC: 32.7 G/DL (ref 31.5–36.5)
MCV RBC AUTO: 98 FL (ref 78–100)
MONOCYTES # BLD AUTO: 0.4 10E3/UL (ref 0–1.3)
MONOCYTES NFR BLD AUTO: 7 %
NEUTROPHILS # BLD AUTO: 2.7 10E3/UL (ref 1.6–8.3)
NEUTROPHILS NFR BLD AUTO: 56 %
NRBC # BLD AUTO: 0 10E3/UL
NRBC BLD AUTO-RTO: 0 /100
P AXIS - MUSE: 66 DEGREES
PLATELET # BLD AUTO: 336 10E3/UL (ref 150–450)
POTASSIUM SERPL-SCNC: 4.3 MMOL/L (ref 3.4–5.3)
PR INTERVAL - MUSE: 160 MS
QRS DURATION - MUSE: 94 MS
QT - MUSE: 414 MS
QTC - MUSE: 471 MS
R AXIS - MUSE: -12 DEGREES
RADIOLOGIST FLAGS: ABNORMAL
RBC # BLD AUTO: 5.01 10E6/UL (ref 3.8–5.2)
SODIUM SERPL-SCNC: 140 MMOL/L (ref 135–145)
SYSTOLIC BLOOD PRESSURE - MUSE: NORMAL MMHG
T AXIS - MUSE: 18 DEGREES
TROPONIN T SERPL HS-MCNC: 7 NG/L
TROPONIN T SERPL HS-MCNC: 8 NG/L
VENTRICULAR RATE- MUSE: 78 BPM
WBC # BLD AUTO: 4.8 10E3/UL (ref 4–11)

## 2025-03-06 PROCEDURE — 74174 CTA ABD&PLVS W/CONTRAST: CPT

## 2025-03-06 PROCEDURE — 80048 BASIC METABOLIC PNL TOTAL CA: CPT | Performed by: EMERGENCY MEDICINE

## 2025-03-06 PROCEDURE — 74174 CTA ABD&PLVS W/CONTRAST: CPT | Mod: 26 | Performed by: RADIOLOGY

## 2025-03-06 PROCEDURE — 71275 CT ANGIOGRAPHY CHEST: CPT | Mod: 26 | Performed by: RADIOLOGY

## 2025-03-06 PROCEDURE — 93005 ELECTROCARDIOGRAM TRACING: CPT | Performed by: EMERGENCY MEDICINE

## 2025-03-06 PROCEDURE — 99284 EMERGENCY DEPT VISIT MOD MDM: CPT | Performed by: EMERGENCY MEDICINE

## 2025-03-06 PROCEDURE — 84484 ASSAY OF TROPONIN QUANT: CPT | Performed by: EMERGENCY MEDICINE

## 2025-03-06 PROCEDURE — 71046 X-RAY EXAM CHEST 2 VIEWS: CPT

## 2025-03-06 PROCEDURE — 99285 EMERGENCY DEPT VISIT HI MDM: CPT | Mod: 25 | Performed by: EMERGENCY MEDICINE

## 2025-03-06 PROCEDURE — 85379 FIBRIN DEGRADATION QUANT: CPT | Performed by: EMERGENCY MEDICINE

## 2025-03-06 PROCEDURE — 71046 X-RAY EXAM CHEST 2 VIEWS: CPT | Mod: 26 | Performed by: RADIOLOGY

## 2025-03-06 PROCEDURE — 36415 COLL VENOUS BLD VENIPUNCTURE: CPT | Performed by: EMERGENCY MEDICINE

## 2025-03-06 PROCEDURE — 93010 ELECTROCARDIOGRAM REPORT: CPT | Performed by: EMERGENCY MEDICINE

## 2025-03-06 PROCEDURE — 250N000011 HC RX IP 250 OP 636: Performed by: EMERGENCY MEDICINE

## 2025-03-06 PROCEDURE — 999N000248 HC STATISTIC IV INSERT WITH US BY RN

## 2025-03-06 PROCEDURE — 71275 CT ANGIOGRAPHY CHEST: CPT

## 2025-03-06 PROCEDURE — 250N000009 HC RX 250: Performed by: EMERGENCY MEDICINE

## 2025-03-06 PROCEDURE — 85025 COMPLETE CBC W/AUTO DIFF WBC: CPT | Performed by: EMERGENCY MEDICINE

## 2025-03-06 RX ORDER — IOPAMIDOL 755 MG/ML
120 INJECTION, SOLUTION INTRAVASCULAR ONCE
Status: COMPLETED | OUTPATIENT
Start: 2025-03-06 | End: 2025-03-06

## 2025-03-06 RX ADMIN — SODIUM CHLORIDE, PRESERVATIVE FREE 90 ML: 5 INJECTION INTRAVENOUS at 15:15

## 2025-03-06 RX ADMIN — IOPAMIDOL 120 ML: 755 INJECTION, SOLUTION INTRAVENOUS at 15:14

## 2025-03-06 ASSESSMENT — COLUMBIA-SUICIDE SEVERITY RATING SCALE - C-SSRS
2. HAVE YOU ACTUALLY HAD ANY THOUGHTS OF KILLING YOURSELF IN THE PAST MONTH?: NO
1. IN THE PAST MONTH, HAVE YOU WISHED YOU WERE DEAD OR WISHED YOU COULD GO TO SLEEP AND NOT WAKE UP?: NO
6. HAVE YOU EVER DONE ANYTHING, STARTED TO DO ANYTHING, OR PREPARED TO DO ANYTHING TO END YOUR LIFE?: NO

## 2025-03-06 ASSESSMENT — ACTIVITIES OF DAILY LIVING (ADL)
ADLS_ACUITY_SCORE: 45

## 2025-03-06 NOTE — DISCHARGE INSTRUCTIONS
Please make an appointment to follow up with Your Primary Care Provider in 1-2 days for further evaluation and recommendations.  If your symptoms recur please come back to the emergency department.

## 2025-03-06 NOTE — ED TRIAGE NOTES
"Pt ambulatory from home with an episode this morning of chest \"tightness\" and arm tingling. Pt states this same episode has happened multiple times over the last few months.      Triage Assessment (Adult)       Row Name 03/06/25 1020          Triage Assessment    Airway WDL WDL        Respiratory WDL    Respiratory WDL WDL        Cardiac WDL    Cardiac WDL X;chest pain        Peripheral/Neurovascular WDL    Peripheral Neurovascular WDL WDL        Cognitive/Neuro/Behavioral WDL    Cognitive/Neuro/Behavioral WDL WDL                     "

## 2025-03-06 NOTE — ED PROVIDER NOTES
ED Provider Note  [facility]      History     Chief Complaint   Patient presents with    Chest Pain     HPI  Toshia Caruso is a 79 year old female with a history of HTN, HLD, who presents to the ED for evaluation of chest pain. The patient states that she began experiencing nausea and tightness across her chest approximately 1 hour ago while sitting. She also had tingling in her hands bilaterally and shortness of breath at that time. In the ED, she continues to endorse nausea and mild chest tightness but denies any ongoing shortness of breath. The patient's  notes that she has been experiencing a runny nose and other cold-like symptoms this week. Patient expresses concern for heart attack today. She denies any history of MI or stents. She has not had a stress test. Patient denies any history of diabetes or blood clots.     Past Medical History  Past Medical History:   Diagnosis Date    Contact dermatitis of eyelid, unspecified laterality 12/13/2015    Depression     Infection due to 2019 novel coronavirus 05/11/2023    Mixed hyperlipidemia     Obstructive sleep apnea     has CPAP    Small bowel obstruction (H) 06/05/2022    TIA (transient ischemic attack) 2022    left sided symtpoms for 4 hours    Transaminitis 02/13/2023     Past Surgical History:   Procedure Laterality Date    COLONOSCOPY N/A 4/28/2016    Procedure: COLONOSCOPY;  Surgeon: Victoria Mathis MD;  Location:  GI    COLONOSCOPY N/A 10/29/2019    Procedure: COLONOSCOPY, WITH POLYPECTOMY;  Surgeon: Perla Hay MD;  Location: [facility]    COLONOSCOPY N/A 11/20/2024    Procedure: COLONOSCOPY, WITH POLYPECTOMY;  Surgeon: Eduardo Beatty MD;  Location:  GI    LAMINECTOMY CERIVCAL POSTERIOR THREE+ LEVELS  2002    C3-7     amLODIPine (NORVASC) 2.5 MG tablet  B Complex-C (SUPER B COMPLEX/VITAMIN C PO)  bisacodyl (DULCOLAX) 5 MG EC tablet  citalopram (CELEXA) 20 MG tablet  cyclobenzaprine  "(FLEXERIL) 5 MG tablet  diphenhydrAMINE-APAP, sleep, (TYLENOL PM EXTRA STRENGTH PO)  fluocinonide (LIDEX) 0.05 % external solution  fluticasone (FLONASE) 50 MCG/ACT nasal spray  folic acid (FOLVITE) 400 MCG tablet  Glucosamine-Chondroit-Vit C-Mn (GLUCOSAMINE CHONDROITIN COMPLX) TABS  loratadine (CLARITIN) 10 MG tablet  Multiple Vitamin (MULTIVITAMINS) CAPS  Nutritional Supplements (ESTROVEN) TABS  omega 3 1000 MG CAPS  polyethylene glycol (GOLYTELY) 236 g suspension  rosuvastatin (CRESTOR) 10 MG tablet      Allergies   Allergen Reactions    Smoke. Itching and Anaphylaxis     Watery eyes    Cat Dander Hives    Cats Itching    Seasonal Allergies      Eyes, throat sinus     Family History  Family History   Problem Relation Age of Onset    Diabetes Mother         lived in [place]    Thyroid Disease Mother 60        lived in [place] -diagnosed in mid-life    Diabetes Father         old age onset- controlled by diet.    C.A.D. Father         silent heart attacks    Glaucoma Father     Colon Cancer Father 70        treated with surgery, 6\" colon removed    Prostate Cancer Father         at 100 was diagnosed with cancer of the hips, metastasized from his prostrate.    Diabetes Maternal Grandfather         when Insulin treatment was initially started.  Lived in [place]    Thyroid Disease Sister 30        hyperthyroidism, radiation, takes thyroid pill daily    Sleep Apnea Daughter     Depression Daughter         taking meds for    Obesity Daughter         extremely overweight    Breast Cancer Other         my fathers side    Depression Other         from depression, anxiety, bipolar    Depression Other         struggled with depression from  graduation to age 26.  In counseling, not advised if he is taking meds    Mental Illness Other         my Mother's side had very odd siblings.  Compulsive, hoarders, & others significantly different from others I knew.    Skin Cancer No family hx of     Melanoma No family hx of      Social " "History   Social History     Tobacco Use    Smoking status: Never     Passive exposure: Never    Smokeless tobacco: Never   Vaping Use    Vaping status: Never Used   Substance Use Topics    Alcohol use: No     Comment: rare    Drug use: No      A medically appropriate review of systems was performed with pertinent positives and negatives noted in the HPI, and all other systems negative.    Physical Exam   BP: (!) 171/92  Pulse: 95  Temp: 98  F (36.7  C)  Resp: 18  Height: 167.6 cm (5' 6\")  Weight: 93.5 kg (206 lb 3.2 oz)  SpO2: 97 %  Physical Exam  Vitals and nursing note reviewed.   Constitutional:       General: She is not in acute distress.     Appearance: Normal appearance. She is not ill-appearing, toxic-appearing or diaphoretic.   HENT:      Head: Normocephalic and atraumatic.   Eyes:      Extraocular Movements: Extraocular movements intact.      Conjunctiva/sclera: Conjunctivae normal.   Cardiovascular:      Rate and Rhythm: Normal rate.      Heart sounds: Normal heart sounds.   Pulmonary:      Effort: Pulmonary effort is normal. No respiratory distress.      Breath sounds: Normal breath sounds. No wheezing, rhonchi or rales.   Abdominal:      General: Abdomen is flat.      Palpations: Abdomen is soft.      Tenderness: There is no abdominal tenderness.   Musculoskeletal:         General: No tenderness. Normal range of motion.      Cervical back: Normal range of motion and neck supple.   Skin:     General: Skin is warm.      Findings: No rash.   Neurological:      General: No focal deficit present.      Mental Status: She is alert and oriented to person, place, and time.   Psychiatric:         Mood and Affect: Mood normal.         Behavior: Behavior normal.         Thought Content: Thought content normal.         Judgment: Judgment normal.           ED Course, Procedures, & Data      Procedures            EKG Interpretation:      Interpreted by Asim Junior MD  Time reviewed: 10:57 AM  Symptoms at time of " EKG: Chest tightness  Rhythm: normal sinus   Rate: normal  Axis: normal  Ectopy: none  Conduction: normal  ST Segments/ T Waves: No ST-T wave changes  Q Waves: none  Comparison to prior: Unchanged from old EKG done on November 5, 2023    Clinical Impression: NSR, no acute ischemia         Results for orders placed or performed during the hospital encounter of 03/06/25   XR Chest 2 Views     Status: None    Narrative    XR CHEST 2 VIEWS  3/6/2025 10:55 AM     HISTORY:  chest pain       COMPARISON:  Chest radiograph 11/5/2023. CT 6/5/2022    FINDINGS:   Lungs are clear. No pleural effusions. No pneumothorax. Cardiac  silhouette is normal. Mild compression deformity in the L1 region.  Paraspinal pleural calcification around T12 again noted as on prior CT  6/5/2022. Degenerative change left shoulder. Cervical spine  laminectomy changes.       Impression    IMPRESSION:  No acute cardiopulmonary findings.    I have personally reviewed the examination and initial interpretation  and I agree with the findings.    CHRISTA BLUM MD         SYSTEM ID:  [id number]   CTA Chest Abdomen Pelvis w Contrast     Status: None (Preliminary result)    Impression    RESIDENT PRELIMINARY INTERPRETATION  Impression:  1. No vascular abnormality. Specifically, no aortic dissection.  2. Age-indeterminate superior endplate compression deformity of the L1  vertebral body, favor acute, with approximately 20% vertebral body  height loss. There is grade 1 retrolisthesis of L1 over L2 and mild  retropulsion, there is no significant spinal canal stenosis.    [Urgent Result: L1 superior plate compression deformity.]    Finding was identified on 3/6/2025 4:39 PM.     Dr. Junior was contacted by Dr. Green at 3/6/2025 4:48 PM and  verbalized understanding of the urgent finding.            Basic metabolic panel     Status: Abnormal   Result Value Ref Range    Sodium 140 135 - 145 mmol/L    Potassium 4.3 3.4 - 5.3 mmol/L    Chloride 103 98 - 107 mmol/L     Carbon Dioxide (CO2) 25 22 - 29 mmol/L    Anion Gap 12 7 - 15 mmol/L    Urea Nitrogen 12.2 8.0 - 23.0 mg/dL    Creatinine 0.79 0.51 - 0.95 mg/dL    GFR Estimate 76 >60 mL/min/1.73m2    Calcium 9.9 8.8 - 10.4 mg/dL    Glucose 107 (H) 70 - 99 mg/dL   Troponin T, High Sensitivity     Status: Normal   Result Value Ref Range    Troponin T, High Sensitivity 7 <=14 ng/L   CBC with platelets and differential     Status: Abnormal   Result Value Ref Range    WBC Count 4.8 4.0 - 11.0 10e3/uL    RBC Count 5.01 3.80 - 5.20 10e6/uL    Hemoglobin 16.0 (H) 11.7 - 15.7 g/dL    Hematocrit 49.0 (H) 35.0 - 47.0 %    MCV 98 78 - 100 fL    MCH 31.9 26.5 - 33.0 pg    MCHC 32.7 31.5 - 36.5 g/dL    RDW 12.3 10.0 - 15.0 %    Platelet Count 336 150 - 450 10e3/uL    % Neutrophils 56 %    % Lymphocytes 33 %    % Monocytes 7 %    % Eosinophils 3 %    % Basophils 0 %    % Immature Granulocytes 1 %    NRBCs per 100 WBC 0 <1 /100    Absolute Neutrophils 2.7 1.6 - 8.3 10e3/uL    Absolute Lymphocytes 1.6 0.8 - 5.3 10e3/uL    Absolute Monocytes 0.4 0.0 - 1.3 10e3/uL    Absolute Eosinophils 0.1 0.0 - 0.7 10e3/uL    Absolute Basophils 0.0 0.0 - 0.2 10e3/uL    Absolute Immature Granulocytes 0.0 <=0.4 10e3/uL    Absolute NRBCs 0.0 10e3/uL   [place] Draw     Status: None    Narrative    The following orders were created for panel order [place] Draw.  Procedure                               Abnormality         Status                     ---------                               -----------         ------                     Extra Blue Top Tube[[id number]]                              Final result               Extra Red Top Tube[[id number]]                               Final result                 Please view results for these tests on the individual orders.   Extra Blue Top Tube     Status: None   Result Value Ref Range    Hold Specimen JIC    Extra Red Top Tube     Status: None   Result Value Ref Range    Hold Specimen JIC    D dimer quantitative     Status:  Abnormal   Result Value Ref Range    D-Dimer Quantitative 0.59 (H) 0.00 - 0.50 ug/mL FEU    Narrative    This D-dimer assay is intended for use in conjunction with a clinical pretest probability assessment model to exclude pulmonary embolism (PE) and deep venous thrombosis (DVT) in outpatients suspected of PE or DVT. The cut-off value is 0.50 ug/mL FEU.    For patients 50 years of age or older, the application of age-adjusted cut-off values for D-Dimer may increase the specificity without significant effect on sensitivity. The literature suggested calculation age adjusted cut-off in ug/L = age in years x 10 ug/L. The results in this laboratory are reported as ug/mL rather than ug/L. The calculation for age adjusted cut off in ug/mL= age in years x 0.01 ug/mL. For example, the cut off for a 76 year old male is 77 x 0.01 ug/mL = 0.76 ug/mL (760 ug/L).    M Sharonda et al. Age adjusted D-dimer cut-off levels to rule out pulmonary embolism: The ADJUST-PE Study. LESVIA 2014;311:3791-4026.; HJ Balbina et al. Diagnostic accuracy of conventional or age adjusted D-dimer cutoff values in older patients with suspected venous thromboembolism. Systemic review and meta-analysis. BMJ 2013:346:f2492.   Troponin T, High Sensitivity     Status: Normal   Result Value Ref Range    Troponin T, High Sensitivity 8 <=14 ng/L   EKG 12-lead, tracing only     Status: None   Result Value Ref Range    Systolic Blood Pressure  mmHg    Diastolic Blood Pressure  mmHg    Ventricular Rate 78 BPM    Atrial Rate 78 BPM    PA Interval 160 ms    QRS Duration 94 ms     ms    QTc 471 ms    P Axis 66 degrees    R AXIS -12 degrees    T Axis 18 degrees    Interpretation ECG       Sinus rhythm  Cannot rule out Inferior infarct , age undetermined  Cannot rule out Anterior infarct , age undetermined  Abnormal ECG  Unconfirmed report - interpretation of this ECG is computer generated - see medical record for final interpretation  Confirmed by - EMERGENCY  ROOM, PHYSICIAN ([id number]),  JAM RIOJAS ([id number]) on 3/6/2025 1:19:07 PM     CBC with platelets differential     Status: Abnormal    Narrative    The following orders were created for panel order CBC with platelets differential.  Procedure                               Abnormality         Status                     ---------                               -----------         ------                     CBC with platelets and d...[[id number]]  Abnormal            Final result                 Please view results for these tests on the individual orders.     Medications   iopamidol (ISOVUE-370) solution 120 mL (120 mLs Intravenous $Given 3/6/25 1517)   Saline Flush (90 mLs Intravenous $Given 3/6/25 2544)     Labs Ordered and Resulted from Time of ED Arrival to Time of ED Departure   BASIC METABOLIC PANEL - Abnormal       Result Value    Sodium 140      Potassium 4.3      Chloride 103      Carbon Dioxide (CO2) 25      Anion Gap 12      Urea Nitrogen 12.2      Creatinine 0.79      GFR Estimate 76      Calcium 9.9      Glucose 107 (*)    CBC WITH PLATELETS AND DIFFERENTIAL - Abnormal    WBC Count 4.8      RBC Count 5.01      Hemoglobin 16.0 (*)     Hematocrit 49.0 (*)     MCV 98      MCH 31.9      MCHC 32.7      RDW 12.3      Platelet Count 336      % Neutrophils 56      % Lymphocytes 33      % Monocytes 7      % Eosinophils 3      % Basophils 0      % Immature Granulocytes 1      NRBCs per 100 WBC 0      Absolute Neutrophils 2.7      Absolute Lymphocytes 1.6      Absolute Monocytes 0.4      Absolute Eosinophils 0.1      Absolute Basophils 0.0      Absolute Immature Granulocytes 0.0      Absolute NRBCs 0.0     D DIMER QUANTITATIVE - Abnormal    D-Dimer Quantitative 0.59 (*)    TROPONIN T, HIGH SENSITIVITY - Normal    Troponin T, High Sensitivity 7     TROPONIN T, HIGH SENSITIVITY - Normal    Troponin T, High Sensitivity 8       CTA Chest Abdomen Pelvis w Contrast   Preliminary Result   RESIDENT PRELIMINARY  INTERPRETATION   Impression:   1. No vascular abnormality. Specifically, no aortic dissection.   2. Age-indeterminate superior endplate compression deformity of the L1   vertebral body, favor acute, with approximately 20% vertebral body   height loss. There is grade 1 retrolisthesis of L1 over L2 and mild   retropulsion, there is no significant spinal canal stenosis.      [Urgent Result: L1 superior plate compression deformity.]      Finding was identified on 3/6/2025 4:39 PM.       Dr. Junior was contacted by Dr. Green at 3/6/2025 4:48 PM and   verbalized understanding of the urgent finding.                   XR Chest 2 Views   Final Result   IMPRESSION:   No acute cardiopulmonary findings.      I have personally reviewed the examination and initial interpretation   and I agree with the findings.      CHRISTA BLUM MD            SYSTEM ID:  [id number]             Critical care was not performed.     Medical Decision Making  The patient's presentation was of high complexity (an acute health issue posing potential threat to life or bodily function).    The patient's evaluation involved:  review of external note(s) from 1 sources (see separate area of note for details)  review of 3+ test result(s) ordered prior to this encounter (see separate area of note for details)  ordering and/or review of 3+ test(s) in this encounter (see separate area of note for details)  independent interpretation of testing performed by another health professional (see separate area of note for details)  discussion of management or test interpretation with another health professional (see separate area of note for details)    The patient's management necessitated high risk (a decision regarding hospitalization).    Assessment & Plan    79-year-old woman presenting with weight sudden onset chest tightness, shortness of breath.  Differential diagnosis includes: ACS, pulmonary embolus, pneumothorax, unlikely pneumonia, esophageal spasm,  GERD.    After thorough history and physical exam patient appears to be in no acute distress.  Currently she is pain-free.  I will obtain x-ray of the chest, EKG, and laboratory studies for further diagnostic evaluation.  She and her  agree with the plan.     Laboratory studies returned with no leukocytosis, WBC is normal at 4800.  Hemoglobin is slightly elevated at 16.0.  Electrolytes show no evidence of dehydration, creatinine is normal at 0.79.  Serial troponins were obtained and there is no evidence of acute MI.  EKG showed no acute ischemia, either.  I reviewed patient's chest x-ray and I read the radiology report; no evidence of pneumonia, pneumothorax, or widened mediastinum.  D-dimer was elevated 0.59, however, within age adjusted D-dimer criteria this is within normal limits.  I do not suspect acute pulmonary embolus.  Patient did report her pain intermittently radiating to the chest and therefore CT angiogram of the aorta was obtained.  I reviewed the study along with the radiology report and I also discussed it with the radiologist; no evidence of an aortic emergency.  There was an L1 compression fracture.    I informed the patient and her  all of the results.  They do state that she had a fall approximately 6 to 9 months ago and subsequently back pain.  That is likely the etiology of her L1 compression fracture.  At this point I do not believe that that needs any further emergent management.  She is stable for discharge with outpatient follow-up.  I do not have a clear etiology however chest tightness that started earlier today, however, currently she is pain-free.  We have effectively ruled out acute MI.  She will follow-up with her primary care physician and return to the emerged department if her symptoms recur.  She is stable for discharge.    I have reviewed the nursing notes. I have reviewed the findings, diagnosis, plan and need for follow up with the patient.    New Prescriptions     No medications on file       Final diagnoses:   None   Gila SUGGS, am serving as a trained medical scribe to document services personally performed by Asim Junior MD, MD, based on the provider's statements to me.     Vernon SUGGS Nikola, MD, MD, was physically present and have reviewed and verified the accuracy of this note documented by Gila Tate.      Asim Junior MD  [facility] EMERGENCY DEPARTMENT  3/6/2025     Asim Junior MD  03/06/25 [id number]

## 2025-03-10 ENCOUNTER — VIRTUAL VISIT (OUTPATIENT)
Dept: FAMILY MEDICINE | Facility: CLINIC | Age: 80
End: 2025-03-10
Payer: COMMERCIAL

## 2025-03-10 DIAGNOSIS — R07.89 CHEST TIGHTNESS: Primary | ICD-10-CM

## 2025-03-10 PROCEDURE — 98005 SYNCH AUDIO-VIDEO EST LOW 20: CPT | Performed by: FAMILY MEDICINE

## 2025-03-10 ASSESSMENT — PATIENT HEALTH QUESTIONNAIRE - PHQ9
SUM OF ALL RESPONSES TO PHQ QUESTIONS 1-9: 3
SUM OF ALL RESPONSES TO PHQ QUESTIONS 1-9: 3
10. IF YOU CHECKED OFF ANY PROBLEMS, HOW DIFFICULT HAVE THESE PROBLEMS MADE IT FOR YOU TO DO YOUR WORK, TAKE CARE OF THINGS AT HOME, OR GET ALONG WITH OTHER PEOPLE: NOT DIFFICULT AT ALL

## 2025-03-10 NOTE — PROGRESS NOTES
"Toshia is a 79 year old who is being evaluated via a billable video visit.    How would you like to obtain your AVS? MyChart  If the video visit is dropped, the invitation should be resent by: Text to cell phone: [phone]  Will anyone else be joining your video visit? No      Assessment & Plan     Chest tightness  Patient has not had any further episodes of chest tightness.  She will continue to monitor her symptoms.  Consider trial of H2 blocker if symptoms we turned.  I have advised follow-up with her primary physician as well.        MED REC REQUIRED  Post Medication Reconciliation Status:     BMI  Estimated body mass index is 33.28 kg/m  as calculated from the following:    Height as of 3/6/25: 1.676 m (5' 6\").    Weight as of 3/6/25: 93.5 kg (206 lb 3.2 oz).             Subjective   Toshia is a 79 year old, presenting for the following health issues:  ER F/U ( ER follow up--chest pain.  Still has some chest pain.   Has had 4 episodes in 2 months.  Verbal consent given for video visit)      3/10/2025    11:31 AM   Additional Questions   Roomed by Ivonne COLINDRES CMA   Accompanied by Self     Via the Health Maintenance questionnaire, the patient has reported the following services have been completed -Mammogram: [facility] 2024-11-11, this information has not been sent to the abstraction team.  Video Start Time:  1141    HPI    Patient has visit today for follow-up from emergency department visit on 6 March.  Symptoms have not returned.  Her workup was negative for cardiac or vascular issues.  ED/UC Followup:    Facility:  [facility] ER  Date of visit: 03/06/2025  Reason for visit: chest pain  Current Status: still has chest pain on/off, has had 4 episodes in the past 2 months.   Had negative work up for MI in ER            Objective           Vitals:  No vitals were obtained today due to virtual visit.    Physical Exam   GENERAL: alert and no distress  EYES: Eyes grossly normal to inspection.  No discharge or erythema, " or obvious scleral/conjunctival abnormalities.  RESP: No audible wheeze, cough, or visible cyanosis.    SKIN: Visible skin clear. No significant rash, abnormal pigmentation or lesions.  NEURO: Cranial nerves grossly intact.  Mentation and speech appropriate for age.  PSYCH: Appropriate affect, tone, and pace of words          Video-Visit Details    Type of service:  Video Visit   Video End Time:11:56 AM  Originating Location (pt. Location): Home    Distant Location (provider location):  On-site  Platform used for Video Visit: [facility]  Signed Electronically by: Jesus Adler MD    Answers submitted by the patient for this visit:  Patient Health Questionnaire (Submitted on 3/10/2025)  If you checked off any problems, how difficult have these problems made it for you to do your work, take care of things at home, or get along with other people?: Not difficult at all  PHQ9 TOTAL SCORE: 3

## 2025-03-11 ENCOUNTER — TELEPHONE (OUTPATIENT)
Dept: INTERNAL MEDICINE | Facility: CLINIC | Age: 80
End: 2025-03-11
Payer: COMMERCIAL

## 2025-03-11 NOTE — TELEPHONE ENCOUNTER
Patient confirmed scheduled appointment:  Date: 9/8  Time: 9:30am  Visit type: Physical   Provider: PCP  [place] [facility] [place]  Additional notes: per check out -  Return in about 1 year (around 8/13/2025)

## 2025-04-22 ENCOUNTER — TRANSFERRED RECORDS (OUTPATIENT)
Dept: HEALTH INFORMATION MANAGEMENT | Facility: CLINIC | Age: 80
End: 2025-04-22

## 2025-04-22 LAB — PHQ9 SCORE: 2

## 2025-06-14 ENCOUNTER — HEALTH MAINTENANCE LETTER (OUTPATIENT)
Age: 80
End: 2025-06-14

## 2025-07-01 DIAGNOSIS — F33.0 MILD EPISODE OF RECURRENT MAJOR DEPRESSIVE DISORDER: ICD-10-CM

## 2025-07-03 RX ORDER — CITALOPRAM HYDROBROMIDE 20 MG/1
20 TABLET ORAL DAILY
Qty: 90 TABLET | Refills: 3 | OUTPATIENT
Start: 2025-07-03

## 2025-07-04 NOTE — TELEPHONE ENCOUNTER
Last Written Prescription:  citalopram (CELEXA) 20 MG tablet 90 tablet 3 8/13/2024 -- No   Sig - Route: Take 1 tablet (20 mg) by mouth daily - Oral     ----------------------  Last Visit Date:   10/8/2024  [facility] [place]    Future Visit Date:    9/8/2025 9:30 AM (30 min)  [place]   Arrive by:  9:15 AM   [facility] PHYSICAL   [facility] ([facility])   Kimo Musa MD     ----------------------      Refill decision:     [x] Medication unable to be refilled by RN due to: Other: Requested refill too soon, should have enough until 8/2025        Request from pharmacy:  Requested Prescriptions   Pending Prescriptions Disp Refills    citalopram (CELEXA) 20 MG tablet [Pharmacy Med Name: CITALOPRAM HBR 20 MG TABLET] 90 tablet 3     Sig: TAKE 1 TABLET BY MOUTH EVERY DAY       [facility] Protocol Passed - 7/3/2025  8:09 PM        Passed - PHQ-9 score less than 5 in past 6 months     Please review last PHQ-9 score.       11/27/2023     2:24 PM 8/12/2024     5:25 PM 3/10/2025    10:57 AM   PHQ-9 SCORE   PHQ-9 Total Score MyChart  5 (Mild depression) 3 (Minimal depression)   PHQ-9 Total Score 7 5 3        Patient-reported             Passed - Medication is active on med list and the sig matches. RN to manually verify dose and sig if red X/fail.     If the protocol passes (green check), you do not need to verify med dose and sig.    A prescription matches if they are the same clinical intention.    For Example: once daily and every morning are the same.    The protocol can not identify upper and lower case letters as matching and will fail.     For Example: Take 1 tablet (50 mg) by mouth daily     TAKE 1 TABLET (50 MG) BY MOUTH DAILY    For all fails (red x), verify dose and sig.    If the refill does match what is on file, the RN can still proceed to approve the refill request.       If they do not match, route to the appropriate provider.             Passed - Recent (12 month) or future (90 days)  visit with authorizing provider's specialty (provided they have been seen in the past 15 months)     The patient must have completed an in-person or virtual visit within the past 12 months or has a future visit scheduled within the next 90 days with the authorizing provider s specialty.  Urgent care and e-visits do not qualify as an office visit for this protocol.          Passed - Medication indicated for associated diagnosis     Medication is associated with one or more of the following diagnoses:              Anxiety             Bipolar  Depression  Obsessive-compulsive disorder             Panic disorder  Postmenopausal flushing             Premenstrual dysphoric disorder             Social phobia   Adjustment disorder with depressed mood   Mood disorder   Adjustment disorder with anxious mood          Passed - Patient is age 18 or older        Passed - No active pregnancy on record        Passed - No positive pregnancy test in last 12 months

## 2025-08-16 ENCOUNTER — MYC REFILL (OUTPATIENT)
Dept: INTERNAL MEDICINE | Facility: CLINIC | Age: 80
End: 2025-08-16
Payer: COMMERCIAL

## 2025-08-16 DIAGNOSIS — F33.0 MILD EPISODE OF RECURRENT MAJOR DEPRESSIVE DISORDER: ICD-10-CM

## 2025-08-18 RX ORDER — CITALOPRAM HYDROBROMIDE 20 MG/1
20 TABLET ORAL DAILY
Qty: 90 TABLET | Refills: 0 | Status: SHIPPED | OUTPATIENT
Start: 2025-08-18

## (undated) DEVICE — TUBING SUCTION 12"X1/4" N612

## (undated) DEVICE — FCP BIOPSY RADIAL JAW 4 JUMBO 3.2MM CHANNEL M00513370

## (undated) DEVICE — SOL WATER IRRIG 1000ML BOTTLE 2F7114

## (undated) DEVICE — GOWN IMPERVIOUS 2XL BLUE

## (undated) DEVICE — SUCTION MANIFOLD NEPTUNE 2 SYS 1 PORT 702-025-000

## (undated) DEVICE — SPECIMEN CONTAINER 3OZ W/FORMALIN 59901

## (undated) RX ORDER — FENTANYL CITRATE 50 UG/ML
INJECTION, SOLUTION INTRAMUSCULAR; INTRAVENOUS
Status: DISPENSED
Start: 2024-11-20

## (undated) RX ORDER — FENTANYL CITRATE 50 UG/ML
INJECTION, SOLUTION INTRAMUSCULAR; INTRAVENOUS
Status: DISPENSED
Start: 2019-10-29